# Patient Record
Sex: MALE | Race: BLACK OR AFRICAN AMERICAN | NOT HISPANIC OR LATINO | Employment: UNEMPLOYED | URBAN - METROPOLITAN AREA
[De-identification: names, ages, dates, MRNs, and addresses within clinical notes are randomized per-mention and may not be internally consistent; named-entity substitution may affect disease eponyms.]

---

## 2023-12-04 ENCOUNTER — TELEPHONE (OUTPATIENT)
Dept: HEMATOLOGY/ONCOLOGY | Facility: CLINIC | Age: 73
End: 2023-12-04
Payer: MEDICAID

## 2024-03-02 PROBLEM — D68.9 COAGULOPATHY: Status: ACTIVE | Noted: 2024-03-02

## 2024-03-02 PROBLEM — Z85.05: Status: ACTIVE | Noted: 2024-03-02

## 2025-04-07 DIAGNOSIS — I27.20 PULMONARY HYPERTENSION: ICD-10-CM

## 2025-04-07 DIAGNOSIS — R91.8 LUNG NODULES: Primary | ICD-10-CM

## 2025-04-27 ENCOUNTER — HOSPITAL ENCOUNTER (INPATIENT)
Facility: HOSPITAL | Age: 75
LOS: 2 days | Discharge: HOME | DRG: 378 | End: 2025-04-29
Attending: STUDENT IN AN ORGANIZED HEALTH CARE EDUCATION/TRAINING PROGRAM | Admitting: STUDENT IN AN ORGANIZED HEALTH CARE EDUCATION/TRAINING PROGRAM
Payer: COMMERCIAL

## 2025-04-27 DIAGNOSIS — I48.91 A-FIB: ICD-10-CM

## 2025-04-27 DIAGNOSIS — K92.2 UPPER GI BLEED: ICD-10-CM

## 2025-04-27 DIAGNOSIS — R79.89 ELEVATED BRAIN NATRIURETIC PEPTIDE (BNP) LEVEL: ICD-10-CM

## 2025-04-27 DIAGNOSIS — K92.2 GASTROINTESTINAL HEMORRHAGE, UNSPECIFIED GASTROINTESTINAL HEMORRHAGE TYPE: Primary | ICD-10-CM

## 2025-04-27 DIAGNOSIS — Z85.05: ICD-10-CM

## 2025-04-27 PROBLEM — I10 HTN (HYPERTENSION): Status: ACTIVE | Noted: 2025-04-27

## 2025-04-27 PROBLEM — I73.9 PVD (PERIPHERAL VASCULAR DISEASE): Chronic | Status: ACTIVE | Noted: 2025-04-27

## 2025-04-27 PROBLEM — E11.9 DM (DIABETES MELLITUS): Status: ACTIVE | Noted: 2025-04-27

## 2025-04-27 PROBLEM — I73.9 PAD (PERIPHERAL ARTERY DISEASE): Status: ACTIVE | Noted: 2025-04-27

## 2025-04-27 PROBLEM — R91.1 PULMONARY NODULE: Chronic | Status: ACTIVE | Noted: 2025-04-27

## 2025-04-27 LAB
ABSOLUTE EOSINOPHIL (OHS): 0.04 K/UL
ABSOLUTE MONOCYTE (OHS): 0.55 K/UL (ref 0.3–1)
ABSOLUTE NEUTROPHIL COUNT (OHS): 2.79 K/UL (ref 1.8–7.7)
BASOPHILS # BLD AUTO: 0.02 K/UL
BASOPHILS NFR BLD AUTO: 0.5 %
EAG (OHS): 134 MG/DL (ref 68–131)
ERYTHROCYTE [DISTWIDTH] IN BLOOD BY AUTOMATED COUNT: 16.9 % (ref 11.5–14.5)
HBA1C MFR BLD: 6.3 % (ref 4–5.6)
HCT VFR BLD AUTO: 25.9 % (ref 40–54)
HGB BLD-MCNC: 7.7 GM/DL (ref 14–18)
IMM GRANULOCYTES # BLD AUTO: 0.01 K/UL (ref 0–0.04)
IMM GRANULOCYTES NFR BLD AUTO: 0.2 % (ref 0–0.5)
INDIRECT COOMBS: NORMAL
LYMPHOCYTES # BLD AUTO: 0.83 K/UL (ref 1–4.8)
MCH RBC QN AUTO: 22.4 PG (ref 27–31)
MCHC RBC AUTO-ENTMCNC: 29.7 G/DL (ref 32–36)
MCV RBC AUTO: 76 FL (ref 82–98)
NUCLEATED RBC (/100WBC) (OHS): 0 /100 WBC
PLATELET # BLD AUTO: 142 K/UL (ref 150–450)
PMV BLD AUTO: 10.4 FL (ref 9.2–12.9)
POCT GLUCOSE: 93 MG/DL (ref 70–110)
RBC # BLD AUTO: 3.43 M/UL (ref 4.6–6.2)
RELATIVE EOSINOPHIL (OHS): 0.9 %
RELATIVE LYMPHOCYTE (OHS): 19.6 % (ref 18–48)
RELATIVE MONOCYTE (OHS): 13 % (ref 4–15)
RELATIVE NEUTROPHIL (OHS): 65.8 % (ref 38–73)
RH BLD: NORMAL
SPECIMEN OUTDATE: NORMAL
WBC # BLD AUTO: 4.24 K/UL (ref 3.9–12.7)

## 2025-04-27 PROCEDURE — 93005 ELECTROCARDIOGRAM TRACING: CPT

## 2025-04-27 PROCEDURE — 63600175 PHARM REV CODE 636 W HCPCS

## 2025-04-27 PROCEDURE — 11000001 HC ACUTE MED/SURG PRIVATE ROOM

## 2025-04-27 PROCEDURE — 85025 COMPLETE CBC W/AUTO DIFF WBC: CPT

## 2025-04-27 PROCEDURE — 93010 ELECTROCARDIOGRAM REPORT: CPT | Mod: ,,, | Performed by: STUDENT IN AN ORGANIZED HEALTH CARE EDUCATION/TRAINING PROGRAM

## 2025-04-27 PROCEDURE — 83036 HEMOGLOBIN GLYCOSYLATED A1C: CPT

## 2025-04-27 PROCEDURE — 86901 BLOOD TYPING SEROLOGIC RH(D): CPT | Mod: 91

## 2025-04-27 RX ORDER — TALC
6 POWDER (GRAM) TOPICAL NIGHTLY PRN
Status: DISCONTINUED | OUTPATIENT
Start: 2025-04-27 | End: 2025-04-29 | Stop reason: HOSPADM

## 2025-04-27 RX ORDER — IBUPROFEN 200 MG
24 TABLET ORAL
Status: DISCONTINUED | OUTPATIENT
Start: 2025-04-27 | End: 2025-04-29 | Stop reason: HOSPADM

## 2025-04-27 RX ORDER — PANTOPRAZOLE SODIUM 40 MG/10ML
40 INJECTION, POWDER, LYOPHILIZED, FOR SOLUTION INTRAVENOUS 2 TIMES DAILY
Status: DISCONTINUED | OUTPATIENT
Start: 2025-04-27 | End: 2025-04-29 | Stop reason: HOSPADM

## 2025-04-27 RX ORDER — INSULIN ASPART 100 [IU]/ML
0-5 INJECTION, SOLUTION INTRAVENOUS; SUBCUTANEOUS
Status: DISCONTINUED | OUTPATIENT
Start: 2025-04-27 | End: 2025-04-29 | Stop reason: HOSPADM

## 2025-04-27 RX ORDER — GLUCAGON 1 MG
1 KIT INJECTION
Status: DISCONTINUED | OUTPATIENT
Start: 2025-04-27 | End: 2025-04-29 | Stop reason: HOSPADM

## 2025-04-27 RX ORDER — HYDROCODONE BITARTRATE AND ACETAMINOPHEN 5; 325 MG/1; MG/1
1 TABLET ORAL EVERY 6 HOURS PRN
Refills: 0 | Status: DISCONTINUED | OUTPATIENT
Start: 2025-04-27 | End: 2025-04-29 | Stop reason: HOSPADM

## 2025-04-27 RX ORDER — IBUPROFEN 200 MG
16 TABLET ORAL
Status: DISCONTINUED | OUTPATIENT
Start: 2025-04-27 | End: 2025-04-29 | Stop reason: HOSPADM

## 2025-04-27 RX ORDER — ONDANSETRON HYDROCHLORIDE 2 MG/ML
4 INJECTION, SOLUTION INTRAVENOUS EVERY 12 HOURS PRN
Status: DISCONTINUED | OUTPATIENT
Start: 2025-04-27 | End: 2025-04-29 | Stop reason: HOSPADM

## 2025-04-27 RX ORDER — PROCHLORPERAZINE EDISYLATE 5 MG/ML
5 INJECTION INTRAMUSCULAR; INTRAVENOUS EVERY 6 HOURS PRN
Status: DISCONTINUED | OUTPATIENT
Start: 2025-04-27 | End: 2025-04-29 | Stop reason: HOSPADM

## 2025-04-27 RX ORDER — ACETAMINOPHEN 325 MG/1
650 TABLET ORAL EVERY 8 HOURS PRN
Status: DISCONTINUED | OUTPATIENT
Start: 2025-04-27 | End: 2025-04-29 | Stop reason: HOSPADM

## 2025-04-27 RX ADMIN — PANTOPRAZOLE SODIUM 40 MG: 40 INJECTION, POWDER, FOR SOLUTION INTRAVENOUS at 07:04

## 2025-04-28 ENCOUNTER — ANESTHESIA (OUTPATIENT)
Dept: ENDOSCOPY | Facility: HOSPITAL | Age: 75
End: 2025-04-28
Payer: COMMERCIAL

## 2025-04-28 ENCOUNTER — ANESTHESIA EVENT (OUTPATIENT)
Dept: ENDOSCOPY | Facility: HOSPITAL | Age: 75
End: 2025-04-28
Payer: COMMERCIAL

## 2025-04-28 LAB
ABSOLUTE EOSINOPHIL (OHS): 0.04 K/UL
ABSOLUTE MONOCYTE (OHS): 0.56 K/UL (ref 0.3–1)
ABSOLUTE NEUTROPHIL COUNT (OHS): 1.99 K/UL (ref 1.8–7.7)
ANION GAP (OHS): 7 MMOL/L (ref 8–16)
APICAL FOUR CHAMBER EJECTION FRACTION: 55 %
APICAL TWO CHAMBER EJECTION FRACTION: 37 %
ASCENDING AORTA: 2.8 CM
AV INDEX (PROSTH): 0.68
AV MEAN GRADIENT: 5 MMHG
AV PEAK GRADIENT: 10 MMHG
AV VALVE AREA BY VELOCITY RATIO: 1.6 CM²
AV VALVE AREA: 1.7 CM²
AV VELOCITY RATIO: 0.63
BASOPHILS # BLD AUTO: 0.02 K/UL
BASOPHILS NFR BLD AUTO: 0.6 %
BSA FOR ECHO PROCEDURE: 1.85 M2
BUN SERPL-MCNC: 24 MG/DL (ref 8–23)
CALCIUM SERPL-MCNC: 8.9 MG/DL (ref 8.7–10.5)
CHLORIDE SERPL-SCNC: 109 MMOL/L (ref 95–110)
CO2 SERPL-SCNC: 20 MMOL/L (ref 23–29)
CREAT SERPL-MCNC: 1.9 MG/DL (ref 0.5–1.4)
CV ECHO LV RWT: 0.36 CM
DOP CALC AO PEAK VEL: 1.6 M/S
DOP CALC AO VTI: 32.8 CM
DOP CALC LVOT AREA: 2.5 CM2
DOP CALC LVOT DIAMETER: 1.8 CM
DOP CALC LVOT PEAK VEL: 1 M/S
DOP CALC LVOT STROKE VOLUME: 56.7 CM3
DOP CALC MV VTI: 34.8 CM
DOP CALCLVOT PEAK VEL VTI: 22.3 CM
E WAVE DECELERATION TIME: 141 MSEC
E/E' RATIO: 7 M/S
ECHO LV POSTERIOR WALL: 0.8 CM (ref 0.6–1.1)
ERYTHROCYTE [DISTWIDTH] IN BLOOD BY AUTOMATED COUNT: 16.5 % (ref 11.5–14.5)
FRACTIONAL SHORTENING: 31.1 % (ref 28–44)
GFR SERPLBLD CREATININE-BSD FMLA CKD-EPI: 36 ML/MIN/1.73/M2
GLUCOSE SERPL-MCNC: 72 MG/DL (ref 70–110)
HCT VFR BLD AUTO: 24.9 % (ref 40–54)
HGB BLD-MCNC: 7.3 GM/DL (ref 14–18)
IMM GRANULOCYTES # BLD AUTO: 0.01 K/UL (ref 0–0.04)
IMM GRANULOCYTES NFR BLD AUTO: 0.3 % (ref 0–0.5)
INTERVENTRICULAR SEPTUM: 1 CM (ref 0.6–1.1)
IVC DIAMETER: 1.69 CM
LEFT ATRIUM AREA SYSTOLIC (APICAL 2 CHAMBER): 24.85 CM2
LEFT ATRIUM AREA SYSTOLIC (APICAL 4 CHAMBER): 25.18 CM2
LEFT ATRIUM VOLUME INDEX MOD: 50 ML/M2
LEFT ATRIUM VOLUME MOD: 92 ML
LEFT INTERNAL DIMENSION IN SYSTOLE: 3.1 CM (ref 2.1–4)
LEFT VENTRICLE DIASTOLIC VOLUME INDEX: 49.73 ML/M2
LEFT VENTRICLE DIASTOLIC VOLUME: 92 ML
LEFT VENTRICLE END DIASTOLIC VOLUME APICAL 2 CHAMBER: 55.53 ML
LEFT VENTRICLE END DIASTOLIC VOLUME APICAL 4 CHAMBER: 65.8 ML
LEFT VENTRICLE END SYSTOLIC VOLUME APICAL 2 CHAMBER: 92.46 ML
LEFT VENTRICLE END SYSTOLIC VOLUME APICAL 4 CHAMBER: 74.53 ML
LEFT VENTRICLE MASS INDEX: 71.8 G/M2
LEFT VENTRICLE SYSTOLIC VOLUME INDEX: 20 ML/M2
LEFT VENTRICLE SYSTOLIC VOLUME: 37 ML
LEFT VENTRICULAR INTERNAL DIMENSION IN DIASTOLE: 4.5 CM (ref 3.5–6)
LEFT VENTRICULAR MASS: 132.8 G
LV LATERAL E/E' RATIO: 5.7 M/S
LV SEPTAL E/E' RATIO: 8.5 M/S
LVED V (TEICH): 91.88 ML
LVES V (TEICH): 36.98 ML
LVOT MG: 2.14 MMHG
LVOT MV: 0.68 CM/S
LYMPHOCYTES # BLD AUTO: 0.53 K/UL (ref 1–4.8)
MAGNESIUM SERPL-MCNC: 2 MG/DL (ref 1.6–2.6)
MCH RBC QN AUTO: 22.1 PG (ref 27–31)
MCHC RBC AUTO-ENTMCNC: 29.3 G/DL (ref 32–36)
MCV RBC AUTO: 75 FL (ref 82–98)
MV MEAN GRADIENT: 2 MMHG
MV PEAK E VEL: 0.85 M/S
MV PEAK GRADIENT: 6 MMHG
MV VALVE AREA BY CONTINUITY EQUATION: 1.63 CM2
NUCLEATED RBC (/100WBC) (OHS): 0 /100 WBC
OHS CV RV/LV RATIO: 0.82 CM
OHS QRS DURATION: 84 MS
OHS QTC CALCULATION: 415 MS
PISA TR MAX VEL: 2.7 M/S
PLATELET # BLD AUTO: 155 K/UL (ref 150–450)
PMV BLD AUTO: 11.4 FL (ref 9.2–12.9)
POCT GLUCOSE: 117 MG/DL (ref 70–110)
POCT GLUCOSE: 63 MG/DL (ref 70–110)
POCT GLUCOSE: 78 MG/DL (ref 70–110)
POCT GLUCOSE: 99 MG/DL (ref 70–110)
POTASSIUM SERPL-SCNC: 4 MMOL/L (ref 3.5–5.1)
PV MV: 0.68 M/S
PV PEAK GRADIENT: 4 MMHG
PV PEAK VELOCITY: 1.02 M/S
RA PRESSURE ESTIMATED: 3 MMHG
RA VOL SYS: 73.77 ML
RBC # BLD AUTO: 3.31 M/UL (ref 4.6–6.2)
RELATIVE EOSINOPHIL (OHS): 1.3 %
RELATIVE LYMPHOCYTE (OHS): 16.8 % (ref 18–48)
RELATIVE MONOCYTE (OHS): 17.8 % (ref 4–15)
RELATIVE NEUTROPHIL (OHS): 63.2 % (ref 38–73)
RIGHT ATRIAL AREA: 24.2 CM2
RIGHT ATRIUM VOLUME AREA LENGTH APICAL 4 CHAMBER: 70.46 ML
RIGHT VENTRICLE DIASTOLIC BASEL DIMENSION: 3.7 CM
RV TB RVSP: 6 MMHG
RV TISSUE DOPPLER FREE WALL SYSTOLIC VELOCITY 1 (APICAL 4 CHAMBER VIEW): 9.8 CM/S
SINUS: 2.96 CM
SODIUM SERPL-SCNC: 136 MMOL/L (ref 136–145)
STJ: 2.6 CM
TDI LATERAL: 0.15 M/S
TDI SEPTAL: 0.1 M/S
TDI: 0.13 M/S
TR MAX PG: 29 MMHG
TRICUSPID ANNULAR PLANE SYSTOLIC EXCURSION: 1.4 CM
TV REST PULMONARY ARTERY PRESSURE: 32 MMHG
WBC # BLD AUTO: 3.15 K/UL (ref 3.9–12.7)
Z-SCORE OF LEFT VENTRICULAR DIMENSION IN END DIASTOLE: -1.33
Z-SCORE OF LEFT VENTRICULAR DIMENSION IN END SYSTOLE: -0.18

## 2025-04-28 PROCEDURE — 80048 BASIC METABOLIC PNL TOTAL CA: CPT

## 2025-04-28 PROCEDURE — 99900035 HC TECH TIME PER 15 MIN (STAT)

## 2025-04-28 PROCEDURE — 11000001 HC ACUTE MED/SURG PRIVATE ROOM

## 2025-04-28 PROCEDURE — 37000009 HC ANESTHESIA EA ADD 15 MINS: Performed by: INTERNAL MEDICINE

## 2025-04-28 PROCEDURE — 37000008 HC ANESTHESIA 1ST 15 MINUTES: Performed by: INTERNAL MEDICINE

## 2025-04-28 PROCEDURE — 43235 EGD DIAGNOSTIC BRUSH WASH: CPT | Mod: ,,, | Performed by: INTERNAL MEDICINE

## 2025-04-28 PROCEDURE — 63600175 PHARM REV CODE 636 W HCPCS: Performed by: HOSPITALIST

## 2025-04-28 PROCEDURE — 63600175 PHARM REV CODE 636 W HCPCS

## 2025-04-28 PROCEDURE — 25000003 PHARM REV CODE 250

## 2025-04-28 PROCEDURE — 83735 ASSAY OF MAGNESIUM: CPT

## 2025-04-28 PROCEDURE — 0DJ08ZZ INSPECTION OF UPPER INTESTINAL TRACT, VIA NATURAL OR ARTIFICIAL OPENING ENDOSCOPIC: ICD-10-PCS | Performed by: INTERNAL MEDICINE

## 2025-04-28 PROCEDURE — 85025 COMPLETE CBC W/AUTO DIFF WBC: CPT

## 2025-04-28 PROCEDURE — 43235 EGD DIAGNOSTIC BRUSH WASH: CPT | Performed by: INTERNAL MEDICINE

## 2025-04-28 PROCEDURE — 99223 1ST HOSP IP/OBS HIGH 75: CPT | Mod: 25,GC,, | Performed by: INTERNAL MEDICINE

## 2025-04-28 PROCEDURE — 25000003 PHARM REV CODE 250: Performed by: INTERNAL MEDICINE

## 2025-04-28 PROCEDURE — 63600175 PHARM REV CODE 636 W HCPCS: Performed by: NURSE ANESTHETIST, CERTIFIED REGISTERED

## 2025-04-28 PROCEDURE — 25000003 PHARM REV CODE 250: Performed by: NURSE ANESTHETIST, CERTIFIED REGISTERED

## 2025-04-28 RX ORDER — TOPICAL ANESTHETIC 200 MG/ML
SPRAY DENTAL; PERIODONTAL
Status: DISCONTINUED | OUTPATIENT
Start: 2025-04-28 | End: 2025-04-28

## 2025-04-28 RX ORDER — LIDOCAINE HYDROCHLORIDE 20 MG/ML
INJECTION, SOLUTION EPIDURAL; INFILTRATION; INTRACAUDAL; PERINEURAL
Status: DISCONTINUED | OUTPATIENT
Start: 2025-04-28 | End: 2025-04-28

## 2025-04-28 RX ORDER — CEFTRIAXONE 1 G/1
1 INJECTION, POWDER, FOR SOLUTION INTRAMUSCULAR; INTRAVENOUS
Status: DISCONTINUED | OUTPATIENT
Start: 2025-04-28 | End: 2025-04-29 | Stop reason: HOSPADM

## 2025-04-28 RX ORDER — DEXTROMETHORPHAN/PSEUDOEPHED 2.5-7.5/.8
DROPS ORAL
Status: COMPLETED | OUTPATIENT
Start: 2025-04-28 | End: 2025-04-28

## 2025-04-28 RX ORDER — PROPOFOL 10 MG/ML
VIAL (ML) INTRAVENOUS
Status: DISCONTINUED | OUTPATIENT
Start: 2025-04-28 | End: 2025-04-28

## 2025-04-28 RX ORDER — PROPOFOL 10 MG/ML
VIAL (ML) INTRAVENOUS CONTINUOUS PRN
Status: DISCONTINUED | OUTPATIENT
Start: 2025-04-28 | End: 2025-04-28

## 2025-04-28 RX ORDER — DEXMEDETOMIDINE HYDROCHLORIDE 100 UG/ML
INJECTION, SOLUTION INTRAVENOUS
Status: DISCONTINUED | OUTPATIENT
Start: 2025-04-28 | End: 2025-04-28

## 2025-04-28 RX ADMIN — LIDOCAINE HYDROCHLORIDE 50 MG: 20 INJECTION, SOLUTION EPIDURAL; INFILTRATION; INTRACAUDAL; PERINEURAL at 03:04

## 2025-04-28 RX ADMIN — CEFTRIAXONE SODIUM 1 G: 1 INJECTION, POWDER, FOR SOLUTION INTRAMUSCULAR; INTRAVENOUS at 08:04

## 2025-04-28 RX ADMIN — TOPICAL ANESTHETIC 1 EACH: 200 SPRAY DENTAL; PERIODONTAL at 03:04

## 2025-04-28 RX ADMIN — PANTOPRAZOLE SODIUM 40 MG: 40 INJECTION, POWDER, FOR SOLUTION INTRAVENOUS at 08:04

## 2025-04-28 RX ADMIN — PROPOFOL 150 MCG/KG/MIN: 10 INJECTION, EMULSION INTRAVENOUS at 03:04

## 2025-04-28 RX ADMIN — PANTOPRAZOLE SODIUM 40 MG: 40 INJECTION, POWDER, FOR SOLUTION INTRAVENOUS at 09:04

## 2025-04-28 RX ADMIN — DEXMEDETOMIDINE HYDROCHLORIDE 8 MCG: 100 INJECTION, SOLUTION, CONCENTRATE INTRAVENOUS at 03:04

## 2025-04-28 RX ADMIN — PROPOFOL 100 MG: 10 INJECTION, EMULSION INTRAVENOUS at 03:04

## 2025-04-28 RX ADMIN — DEXTROSE MONOHYDRATE 12.5 G: 25 INJECTION, SOLUTION INTRAVENOUS at 11:04

## 2025-04-28 RX ADMIN — GLYCOPYRROLATE 0.2 MG: 0.2 INJECTION, SOLUTION INTRAMUSCULAR; INTRAVITREAL at 03:04

## 2025-04-28 NOTE — TRANSFER OF CARE
"Anesthesia Transfer of Care Note    Patient: Oneil Meza    Procedure(s) Performed: Procedure(s) (LRB):  EGD (ESOPHAGOGASTRODUODENOSCOPY) (N/A)    Patient location: GI    Anesthesia Type: general    Transport from OR: Transported from OR on room air with adequate spontaneous ventilation    Post pain: adequate analgesia    Post assessment: no apparent anesthetic complications and tolerated procedure well    Post vital signs: stable    Level of consciousness: awake and alert    Nausea/Vomiting: no nausea/vomiting    Complications: none    Transfer of care protocol was followed      Last vitals: Visit Vitals  /69 (BP Location: Left arm, Patient Position: Lying)   Pulse 74   Temp 36.5 °C (97.7 °F) (Temporal)   Resp 18   Ht 5' 8" (1.727 m)   Wt 71.3 kg (157 lb 3 oz)   SpO2 98%   BMI 23.90 kg/m²     "

## 2025-04-28 NOTE — ASSESSMENT & PLAN NOTE
Patient's hemorrhage is due to unsure source, this bleeding.  Patient is on anticoagulant. Patients most recent Hgb, Hct, platelets, and INR are listed below.  Recent Labs     04/27/25  1257 04/27/25 2016   HGB 6.3* 7.7*   HCT 21.3* 25.9*    142*   INR 1.2  --      Plan  - Will trend hemoglobin/hematocrit Daily  - Will monitor and correct any coagulation defects  - Will transfuse if Hgb is <7g/dl (<8g/dl in cases of active ACS) or if patient has rapid bleeding leading to hemodynamic instability  - we will monitor and trend  -and GI pathway, pantoprazole 40 mg b.i.d.  -currently clear liquid diet  -NPO after midnight  -GI consulted

## 2025-04-28 NOTE — ASSESSMENT & PLAN NOTE
Patient has long standing persistent (>12 months) atrial fibrillation.   -on home Coumadin, we will touch base with PCP to verify medication but hold for now Pulse  Min: 56  Max: 76     Antiarrhythmics   Coumadin on hold for now    Anticoagulants       Plan  - Replete lytes with a goal of K>4, Mg >2  - Patient is anticoagulated, see medications listed above.  - Patient's afib is currently controlled  - currently holding Coumadin  -will verify with PCP tomorrow Coumadin dose  -telemetry monitor

## 2025-04-28 NOTE — HPI
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  He gives an account of feeling light-headed, dizzy, faint feeling, nausea this am, contant leg cramps, and dark stools for the last few days. He has been getting SOB upon exertion. He denies CP, fever, chills. He gives an account of having dark stools for the last few days and in the previous ER +guaiac-positive brown stool. He does take coumadin but not sure about the dosage and give an account of taking it BID but forgets sometimes but states he took one today. Patient unsure about name of daily medications he takes. He is AAO4, able to questions appropriately.  Patient was transferred to [place] for GI consult under hospital medicine service.    ROS: + short of breath on exertion, bilateral lower extremity cramps, dizziness, faint feeling, dark stools  PE:  Bilateral lower extremity with +1 pedal pulse the right with, +1 dorsalis pulse to left foot, no signs of distress    Previous ER lab: Hgb 6.3 (base 10-12), hematocrit 21.3, platelet 150.  Cr 2.2 (base 1.4-1.8)  PT/INR: 13.3/1.2.    Troponin 0.014, , sodium 137, potassium 4.2, chloride 107, CO2 23, BUN 31, creatinine 2.2, glucose 187, albumin 3.6, AST 11, ALT 7, INR 1.2, stool for occult blood positive, low white blood cells 2.95     Previous ER meds:  IV fluid along, Protonix bolus and infusion.  Packed red blood cells are ordered, awaiting transfusion.  He has 2 peripheral IVs in place.       Imaging a previous hospital:  Chest x-ray had no acute chest disease identified.  Left lung nodule noted on the recent CT chest without contrast from April 21 was not clearly  visible on the portable chest x-ray.     EKG showed atrial fibrillation with ventricular rate 74.

## 2025-04-28 NOTE — ASSESSMENT & PLAN NOTE
Patient's blood pressure range in the last 24 hours was: BP  Min: 110/68  Max: 173/70.The patient's inpatient anti-hypertensive regimen is listed below:  Current Antihypertensives       Plan  - BP is controlled, no changes needed to their regimen  - we will adjust as needed  -currently holding blood pressure medication

## 2025-04-28 NOTE — SUBJECTIVE & OBJECTIVE
Interval History:  Doing well today, denies any further black or bloody bowel movements.  States he occasionally gets cramps in his legs but otherwise no acute complaints.  He does not recall why he was switched from apixaban to Coumadin; this is also not clear through my extensive chart review.    Review of Systems  Objective:     Vital Signs (Most Recent):  Temp: 98.6 °F (37 °C) (04/28/25 1112)  Pulse: 70 (04/28/25 1112)  Resp: 18 (04/28/25 1112)  BP: (!) 143/71 (04/28/25 1112)  SpO2: 99 % (04/28/25 1112) Vital Signs (24h Range):  Temp:  [98.2 °F (36.8 °C)-98.7 °F (37.1 °C)] 98.6 °F (37 °C)  Pulse:  [54-88] 70  Resp:  [18-31] 18  SpO2:  [96 %-100 %] 99 %  BP: (110-173)/(53-89) 143/71     Weight: 71.3 kg (157 lb 3 oz)  Body mass index is 23.9 kg/m².    Intake/Output Summary (Last 24 hours) at 4/28/2025 1200  Last data filed at 4/28/2025 0449  Gross per 24 hour   Intake --   Output 500 ml   Net -500 ml         Physical Exam  Vitals reviewed.   Constitutional:       General: He is not in acute distress.     Appearance: He is well-developed. He is not diaphoretic.   HENT:      Head: Normocephalic and atraumatic.      Nose: Nose normal.   Eyes:      General: No scleral icterus.     Pupils: Pupils are equal, round, and reactive to light.   Neck:      Trachea: No tracheal deviation.   Cardiovascular:      Rate and Rhythm: Normal rate. Rhythm irregular.      Heart sounds: Normal heart sounds. No murmur heard.     No friction rub. No gallop.   Pulmonary:      Effort: Pulmonary effort is normal. No respiratory distress.      Breath sounds: Normal breath sounds.   Abdominal:      General: There is no distension.      Palpations: Abdomen is soft. There is no mass.      Tenderness: There is no abdominal tenderness.      Hernia: No hernia is present.   Musculoskeletal:         General: No deformity.      Cervical back: Normal range of motion.   Skin:     General: Skin is warm and dry.      Findings: No rash.   Neurological:       Mental Status: He is alert and oriented to person, place, and time.   Psychiatric:         Behavior: Behavior normal.               Significant Labs: All pertinent labs within the past 24 hours have been reviewed.    Significant Imaging: I have reviewed all pertinent imaging results/findings within the past 24 hours.

## 2025-04-28 NOTE — ASSESSMENT & PLAN NOTE
"Patient's FSGs are uncontrolled due to hyperglycemia on current medication regimen.  Last A1c reviewed-   Lab Results   Component Value Date    HGBA1C 6.8 (H) 07/11/2016     Most recent fingerstick glucose reviewed- No results for input(s): "POCTGLUCOSE" in the last 24 hours.  Current correctional scale  Low  Maintain anti-hyperglycemic dose as follows-   Antihyperglycemics (From admission, onward)      Start     Stop Route Frequency Ordered    04/27/25 2144  insulin aspart U-100 pen 0-5 Units         -- SubQ Before meals & nightly PRN 04/27/25 2044          Hold Oral hypoglycemics while patient is in the hospital.    -Accu-Cheks AC and HS  -NPO after midnight, thank you 6 Accu-Checks    "

## 2025-04-28 NOTE — ASSESSMENT & PLAN NOTE
Patient's blood pressure range in the last 24 hours was: BP  Min: 127/83  Max: 173/70.The patient's inpatient anti-hypertensive regimen is listed below:  Current Antihypertensives       Plan  - BP is controlled, no changes needed to their regimen  - we will adjust as needed  -currently holding blood pressure medication

## 2025-04-28 NOTE — ASSESSMENT & PLAN NOTE
-holding Coumadin for now and may want to consider changing medication is there is no clear indication for Coumadin rather than apixaban

## 2025-04-28 NOTE — ASSESSMENT & PLAN NOTE
-no known cardiac history other than hypertension, on physical exam no elevated JVD, no bilateral lower extremity edema,lungs clear by shortness of breath on exertion reported  -elevated   -pending echo  -cardiac monitor  -I's and O's

## 2025-04-28 NOTE — ASSESSMENT & PLAN NOTE
Patient's FSGs are uncontrolled due to hyperglycemia on current medication regimen.  Last A1c reviewed-   Lab Results   Component Value Date    HGBA1C 6.3 (H) 04/27/2025     Most recent fingerstick glucose reviewed-   Recent Labs   Lab 04/27/25 2058 04/28/25  0629 04/28/25  1111   POCTGLUCOSE 93 99 63*     Current correctional scale  Low  Maintain anti-hyperglycemic dose as follows-   Antihyperglycemics (From admission, onward)      Start     Stop Route Frequency Ordered    04/27/25 2144  insulin aspart U-100 pen 0-5 Units         -- SubQ Before meals & nightly PRN 04/27/25 2044          Hold Oral hypoglycemics while patient is in the hospital.    -Accu-Cheks AC and HS  -NPO after midnight, thank you 6 Accu-Checks

## 2025-04-28 NOTE — H&P
[facility] Medicine  History & Physical    Patient Name: Oneil Meza  MRN: [id number]  Patient Class: IP- Inpatient  Admission Date: 4/27/2025  Attending Physician: Jeff Castro MD   Primary Care Provider: Korey Bower MD         Patient information was obtained from patient, past medical records, and ER records.     Subjective:     Principal Problem:GI bleed    Chief Complaint: No chief complaint on file.       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  He gives an account of feeling light-headed, dizzy, faint feeling, nausea this am, contant leg cramps, and dark stools for the last few days. He has been getting SOB upon exertion. He denies CP, fever, chills. He gives an account of having dark stools for the last few days and in the previous ER +guaiac-positive brown stool. He does take coumadin but not sure about the dosage and give an account of taking it BID but forgets sometimes but states he took one today. Patient unsure about name of daily medications he takes. He is AAO4, able to questions appropriately.  Patient was transferred to [place] for GI consult under hospital medicine service.    ROS: + short of breath on exertion, bilateral lower extremity cramps, dizziness, faint feeling, dark stools  PE:  Bilateral lower extremity with +1 pedal pulse the right with, +1 dorsalis pulse to left foot, no signs of distress    Previous ER lab: Hgb 6.3 (base 10-12), hematocrit 21.3, platelet 150.  Cr 2.2 (base 1.4-1.8)  PT/INR: 13.3/1.2.    Troponin 0.014, , sodium 137, potassium 4.2, chloride 107, CO2 23, BUN 31, creatinine 2.2,  glucose 187, albumin 3.6, AST 11, ALT 7, INR 1.2, stool for occult blood positive, low white blood cells 2.95     Previous ER meds:  IV fluid along, Protonix bolus and infusion.  Packed red blood cells are ordered, awaiting transfusion.  He has 2 peripheral IVs in place.       Imaging a previous hospital:  Chest x-ray had no acute chest disease identified.  Left lung nodule noted on the recent CT chest without contrast from April 21 was not clearly visible on the portable chest x-ray.     EKG showed atrial fibrillation with ventricular rate 74.    No new subjective & objective note has been filed under this hospital service since the last note was generated.    Assessment/Plan:     Assessment & Plan  History of primary malignant neoplasm of liver  -patient with complaints of abdominal pain to the epigastric region and tenderness to touch  -CTA abdomen pelvis with no contrast: Pending    Coagulopathy  -holding Coumadin for now  -slightly supratherapeutic PT 13.1  -day shift to notify PCP or clear up Coumadin home ordered prior to resuming and while in the setting of GI bleed    PAD (peripheral artery disease)  History of stent to right lower extremity  -encourage exercise  -dietary modification:  Healthy heart diet and return to oral intake  -manage blood pressure  -corona anticoagulant but on hold for right now due to GI bleed  -bilateral arterial ultrasound: Pending    HTN (hypertension)  Patient's blood pressure range in the last 24 hours was: BP  Min: 127/83  Max: 173/70.The patient's inpatient anti-hypertensive regimen is listed below:  Current Antihypertensives       Plan  - BP is controlled, no changes needed to their regimen  - we will adjust as needed  -currently holding blood pressure medication  DM (diabetes mellitus)  Patient's FSGs are uncontrolled due to hyperglycemia on current medication regimen.  Last A1c reviewed-   Lab Results   Component Value Date    HGBA1C 6.8 (H) 07/11/2016     Most recent  "fingerstick glucose reviewed- No results for input(s): "POCTGLUCOSE" in the last 24 hours.  Current correctional scale  Low  Maintain anti-hyperglycemic dose as follows-   Antihyperglycemics (From admission, onward)      Start     Stop Route Frequency Ordered    04/27/25 2144  insulin aspart U-100 pen 0-5 Units         -- SubQ Before meals & nightly PRN 04/27/25 2044          Hold Oral hypoglycemics while patient is in the hospital.    -Accu-Cheks AC and HS  -NPO after midnight, thank you 6 Accu-Checks    Pulmonary nodule  -noted in history, appears stable, outpatient CT chest  -denies smoking    A-fib  Patient has long standing persistent (>12 months) atrial fibrillation.   -on home Coumadin, we will touch base with PCP to verify medication but hold for now Pulse  Min: 56  Max: 76     Antiarrhythmics   Coumadin on hold for now    Anticoagulants       Plan  - Replete lytes with a goal of K>4, Mg >2  - Patient is anticoagulated, see medications listed above.  - Patient's afib is currently controlled  - currently holding Coumadin  -will verify with PCP tomorrow Coumadin dose  -telemetry monitor      GI bleed  Patient's hemorrhage is due to  unsure source , this bleeding.  Patient is on anticoagulant. Patients most recent Hgb, Hct, platelets, and INR are listed below.  Recent Labs     04/27/25  1257 04/27/25 2016   HGB 6.3* 7.7*   HCT 21.3* 25.9*    142*   INR 1.2  --      Plan  - Will trend hemoglobin/hematocrit Daily  - Will monitor and correct any coagulation defects  - Will transfuse if Hgb is <7g/dl (<8g/dl in cases of active ACS) or if patient has rapid bleeding leading to hemodynamic instability  - we will monitor and trend  -and GI pathway, pantoprazole 40 mg b.i.d.  -currently clear liquid diet  -NPO after midnight  -GI consulted  Elevated brain natriuretic peptide (BNP) level  -no known cardiac history other than hypertension, on physical exam no elevated JVD, no bilateral lower extremity edema,lungs " clear by shortness of breath on exertion reported  -elevated   -pending echo  -cardiac monitor  -I's and O's    VTE Risk Mitigation (From admission, onward)           Ordered     IP VTE HIGH RISK PATIENT  Once         04/27/25 1932     Place sequential compression device  Until discontinued         04/27/25 1932                           Seen patient with Dr. Jeff Castro with hospital medicine service.         Marisabel Richardson, MERLY  Department of Hospital Medicine  [place] - Telemetry

## 2025-04-28 NOTE — ASSESSMENT & PLAN NOTE
-patient with complaints of abdominal pain to the epigastric region and tenderness to touch  -CTA abdomen pelvis with no contrast: Pending

## 2025-04-28 NOTE — NURSING
Received patient from [facility] via stretcher on stable conditions, AAOx4, on room air, denies any pain or discomfort at this time. Orientation to room provided, voices understanding. Vitals taken, telemetry monitor in place. Call light within his reach, bed in low position, bed alarm on, encourage to call as needed, voices understanding.   healthy happy mom and baby

## 2025-04-28 NOTE — ANESTHESIA PREPROCEDURE EVALUATION
04/28/2025  Oneil Meza is a 74 y.o., male.    Cirrosis post chemo for HCC    Pre-op Assessment     I have reviewed the Nursing Notes.    I have reviewed the Medications.     Review of Systems  Anesthesia Hx:  No problems with previous Anesthesia             Denies Family Hx of Anesthesia complications.     Social:  Non-Smoker, No Alcohol Use       Hematology/Oncology:  Hematology Normal   Oncology Normal                                   EENT/Dental:  EENT/Dental Normal           Cardiovascular:  Exercise tolerance: good   Hypertension   CAD                    Coronary Artery Disease:                            Hypertension     Atrial Fibrillation     Pulmonary:  Pulmonary Normal                       Renal/:  Renal/ Normal                 Hepatic/GI:  Hepatic/GI Normal                    Musculoskeletal:  Musculoskeletal Normal                Neurological:  Neurology Normal                                      Endocrine:  Diabetes    Diabetes                          Physical Exam  General: Well nourished    Airway:  Mallampati: II / II  Mouth Opening: Normal  TM Distance: Normal  Tongue: Normal  Neck ROM: Normal ROM    Dental:  Intact        Anesthesia Plan  Type of Anesthesia, risks & benefits discussed:    Anesthesia Type: Gen Natural Airway  Intra-op Monitoring Plan: Standard ASA Monitors  Post Op Pain Control Plan: multimodal analgesia  Induction:  IV  Airway Plan: Direct, Post-Induction  Informed Consent: Informed consent signed with the Patient and all parties understand the risks and agree with anesthesia plan.  All questions answered.   ASA Score: 3  Anesthesia Plan Notes: 7.3/24.9 H/H    Ready For Surgery From Anesthesia Perspective.     .

## 2025-04-28 NOTE — ASSESSMENT & PLAN NOTE
Patient has long standing persistent (>12 months) atrial fibrillation.   -on home Coumadin, we will touch base with PCP to verify medication but hold for now Pulse  Min: 54  Max: 88     Antiarrhythmics   Holding blood pressure medications for now in the setting of acute GI bleed    Anticoagulants       Plan  - Replete lytes with a goal of K>4, Mg >2  - Patient is not anticoagulated due to GI bleed  - Patient's afib is currently controlled  - currently holding Coumadin and would consider switch to DOAC given lower bleeding risk with apixaban in patients with cirrhosis  -telemetry monitor

## 2025-04-28 NOTE — CONSULTS
"[facility]    CC: "GI bleed"     HPI 74 y.o. male with a history of HCC/cholangiocarcinoma s/p resection in 2023, cirrhosis, afib on Coumadin, and PD s/p femoral artery stenting presenting due to nausea and shortness of breath associated with "black diarrhea" for the last 3 days. Denies abdominal pain. Denies NSAID use. He was transferred from [facility] for GI services.    HCC history complex, as he is s/p resection in 2023. Chemotherapy recommended at that time, however, not completed due to patient preferences after discussion with oncology. He agreed to undergo period surveillance however, this did not occur.    Chart reviewed and summarized here, collateral obtained from nursing staff.    Past Medical History  Past Medical History:   Diagnosis Date    Anticoagulant long-term use     Coronary artery disease     Hypertension         Social History  Social History[1]    Family History  No family history of gastric or colonic malignancy. No family history of liver disease     Physical Examination  /73 (BP Location: Right arm, Patient Position: Lying)   Pulse (!) 54   Temp 98.7 °F (37.1 °C) (Oral)   Resp 18   Ht 5' 8" (1.727 m)   Wt 71.3 kg (157 lb 3 oz)   SpO2 99%   BMI 23.90 kg/m²   General appearance: alert, cooperative, no distress  Eyes: conjunctivae/corneas clear  Lungs: no increased work of breathing  Heart: 2+ radial pulses regular and symmetric  Abdomen: soft, non-tender. Large abdominal scar  Extremities: extremities symmetric; no clubbing, cyanosis, or edema      Labs:  Recent Labs   Lab 04/27/25  1257 04/27/25  2016 04/28/25  0631   WBC 2.95* 4.24 3.15*   HGB 6.3* 7.7* 7.3*   HCT 21.3* 25.9* 24.9*    142* 155     --  136   K 4.2  --  4.0     --  109   CREATININE 2.2*  --  1.9*   BUN 31*  --  24*   CO2 23  --  20*   ALT 7*  --   --    AST 11  --   --         Imaging:  CXR without acute pathology  I have personally reviewed and interpreted these " images.    Assessment:   74M with a history of HCC/cholangiocarcinoma, cirrhosis, and anticoagulant use presenting with symptomatic anemia with Hb of 6.3 on presentation with baseline of 10-13. On the differential is hemobilia s/p recurrent cholangiocarcinoma, bleeding met, peptic ulcer, bowel ischemia, PHG, GAVE.     Plan:  BID PPI  EGD today  Remain NPO  Monitor H/H  Transfuse >7 careful not to over transfuse in case of portal hypertensive bleeding    Discussed with Dr. Sg Batista DO  [facility], PGY IV          [1]   Social History  Tobacco Use    Smoking status: Every Day     Current packs/day: 1.00     Average packs/day: 1 pack/day for 25.0 years (25.0 ttl pk-yrs)     Types: Cigarettes   Substance Use Topics    Alcohol use: No    Drug use: Yes     Comment: heroin-snort

## 2025-04-28 NOTE — ASSESSMENT & PLAN NOTE
-holding Coumadin for now  -slightly supratherapeutic PT 13.1  -day shift to notify PCP or clear up Coumadin home ordered prior to resuming and while in the setting of GI bleed

## 2025-04-28 NOTE — CARE UPDATE
04/28/25 0000   Respiratory Evaluation   $ Care Plan Tech Time 15 min  (ekg completed)     EKG was delayed because it did not come across respiratory printer nor EPIC screen at the time it stated it was ordered. Pt was transferred from ED to telemetry.

## 2025-04-28 NOTE — PLAN OF CARE
went to meet with patient. Patient reports he is independent and lives at home with his brother. He does not use any DME besides a glucometer. Patient drives at times, but his brother will transport home at discharge. Patient follows with [facility] in [place]. They do provide transportation to appointments. He is on coumadin as well, which [facility] follows his labs.  to reach out to [facility] ([facility] to schedule hospital follow-up. No anticipated discharge needs at this time. Patient encouraged to call with any questions or concerns.  will continue to follow patient through transitions of care and assist with any discharge needs.     [email or web address]  [facility]    [address] [place] [address] · [address]  [phone]  Open · Closes 5 PM    Other Contacts    Name Relation Home Work Mobile   Carlos Hanna [phone]        04/28/25 1036   Discharge Assessment   Assessment Type Discharge Planning Assessment   Confirmed/corrected address, phone number and insurance Yes   Confirmed Demographics Correct on Facesheet   Source of Information patient;health record   People in Home sibling(s)   Do you expect to return to your current living situation? Yes   Do you have help at home or someone to help you manage your care at home? Yes   Who are your caregiver(s) and their phone number(s)? Carlos Hanna [phone]   Prior to hospitilization cognitive status: Alert/Oriented   Current cognitive status: Alert/Oriented   Walking or Climbing Stairs Difficulty no   Dressing/Bathing Difficulty no   Equipment Currently Used at Home glucometer   Do you take prescription medications? Yes   Do you have prescription coverage? Yes   Do you have any problems affording any of your prescribed medications? No   Is the patient taking medications as prescribed? yes   Who is going to help you get home at discharge? Carlos Hanna [phone]   [phone]   How do you get to doctors appointments? car, drives self;family or friend will provide;agency   Are you on dialysis? No   Do you take coumadin? Yes   Who monitors your labs? [facility]   Discharge Plan A Home with family   Discharge Plan B Home with family;Home Health   DME Needed Upon Discharge  none   Discharge Plan discussed with: Patient   Transition of Care Barriers None   Physical Activity   On average, how many days per week do you engage in moderate to strenuous exercise (like a brisk walk)? Pt Declined   On average, how many minutes do you engage in exercise at this level? Pt Declined   Financial Resource Strain   How hard is it for you to pay for the very basics like food, housing, medical care, and heating? Not hard   Housing Stability   In the last 12 months, was there a time when you were not able to pay the mortgage or rent on time? N   At any time in the past 12 months, were you homeless or living in a shelter (including now)? N   Transportation Needs   In the past 12 months, has lack of transportation kept you from medical appointments or from getting medications? no   In the past 12 months, has lack of transportation kept you from meetings, work, or from getting things needed for daily living? No   Food Insecurity   Within the past 12 months, you worried that your food would run out before you got the money to buy more. Never true   Within the past 12 months, the food you bought just didn't last and you didn't have money to get more. Never true     Jaycee Auguste RN    [phone]

## 2025-04-28 NOTE — PLAN OF CARE
Report called to Jasmin   Recovery complete. Patient recovered to baseline. Discharge instructions reviewed with patient. VSS.

## 2025-04-28 NOTE — PROGRESS NOTES
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  He gives an account of feeling light-headed, dizzy, faint feeling, nausea this am, contant leg cramps, and dark stools for the last few days. He has been getting SOB upon exertion. He denies CP, fever, chills. He gives an account of having dark stools for the last few days and in the previous ER +guaiac-positive brown stool. He does take coumadin but not sure about the dosage and give an account of taking it BID but forgets sometimes but states he took one today. Patient unsure about name of daily medications he takes. He is AAO4, able to questions appropriately.  Patient was transferred to [facility] for GI consult under hospital medicine service.    ROS: + short of breath on exertion, bilateral lower extremity cramps, dizziness, faint feeling, dark stools  PE:  Bilateral lower extremity with +1 pedal pulse the right with, +1 dorsalis pulse to left foot, no signs of distress    Previous ER lab: Hgb 6.3 (base 10-12), hematocrit 21.3, platelet 150.  Cr 2.2 (base 1.4-1.8)  PT/INR: 13.3/1.2.    Troponin 0.014, , sodium 137, potassium 4.2, chloride 107, CO2 23, BUN 31, creatinine 2.2, glucose 187, albumin 3.6, AST 11, ALT 7, INR 1.2, stool for occult blood positive, low white blood cells 2.95      Previous ER meds:  IV fluid along, Protonix bolus and infusion.  Packed red blood cells are ordered, awaiting transfusion.  He has 2 peripheral IVs in place.       Imaging a previous hospital:  Chest x-ray had no acute chest disease identified.  Left lung nodule noted on the recent CT chest without contrast from April 21 was not clearly visible on the portable chest x-ray.     EKG showed atrial fibrillation with ventricular rate 74.    Overview/Hospital Course:  No notes on file    Interval History:  Doing well today, denies any further black or bloody bowel movements.  States he occasionally gets cramps in his legs but otherwise no acute complaints.  He does not recall why he was switched from apixaban to Coumadin; this is also not clear through my extensive chart review.    Review of Systems  Objective:     Vital Signs (Most Recent):  Temp: 98.6 °F (37 °C) (04/28/25 1112)  Pulse: 70 (04/28/25 1112)  Resp: 18 (04/28/25 1112)  BP: (!) 143/71 (04/28/25 1112)  SpO2: 99 % (04/28/25 1112) Vital Signs (24h Range):  Temp:  [98.2 °F (36.8 °C)-98.7 °F (37.1 °C)] 98.6 °F (37 °C)  Pulse:  [54-88] 70  Resp:  [18-31] 18  SpO2:  [96 %-100 %] 99 %  BP: (110-173)/(53-89) 143/71     Weight: 71.3 kg (157 lb 3 oz)  Body mass index is 23.9 kg/m².    Intake/Output Summary (Last 24 hours) at 4/28/2025 1200  Last data filed at 4/28/2025 0449  Gross per 24 hour   Intake --   Output 500 ml   Net -500 ml         Physical Exam  Vitals reviewed.   Constitutional:       General: He is not in acute distress.     Appearance: He is well-developed. He is not diaphoretic.   HENT:      Head: Normocephalic and atraumatic.      Nose: Nose normal.   Eyes:      General: No scleral icterus.     Pupils: Pupils are equal, round, and reactive to light.   Neck:      Trachea: No tracheal deviation.   Cardiovascular:      Rate and Rhythm: Normal rate. Rhythm irregular.      Heart sounds: Normal heart sounds. No murmur heard.     No friction rub. No gallop.    Pulmonary:      Effort: Pulmonary effort is normal. No respiratory distress.      Breath sounds: Normal breath sounds.   Abdominal:      General: There is no distension.      Palpations: Abdomen is soft. There is no mass.      Tenderness: There is no abdominal tenderness.      Hernia: No hernia is present.   Musculoskeletal:         General: No deformity.      Cervical back: Normal range of motion.   Skin:     General: Skin is warm and dry.      Findings: No rash.   Neurological:      Mental Status: He is alert and oriented to person, place, and time.   Psychiatric:         Behavior: Behavior normal.               Significant Labs: All pertinent labs within the past 24 hours have been reviewed.    Significant Imaging: I have reviewed all pertinent imaging results/findings within the past 24 hours.      Assessment & Plan  GI bleed  Patient's hemorrhage is due to gastrointestinal bleed, this bleeding.  Patient is on anticoagulant. Patients most recent Hgb, Hct, platelets, and INR are listed below.  Recent Labs     04/27/25  1257 04/27/25  2016 04/28/25  0631   HGB 6.3* 7.7* 7.3*   HCT 21.3* 25.9* 24.9*    142* 155   INR 1.2  --   --      Plan  - Will trend hemoglobin/hematocrit Daily  - Will monitor and correct any coagulation defects  - Will transfuse if Hgb is <7g/dl (<8g/dl in cases of active ACS) or if patient has rapid bleeding leading to hemodynamic instability  - we will monitor and trend  -GI pathway, pantoprazole 40 mg b.i.d. and IV ceftriaxone for now given history of cirrhosis; no varices  -NPO after midnight with plans for EGD today  -GI consulted  A-fib  Patient has long standing persistent (>12 months) atrial fibrillation.   -on home Coumadin, we will touch base with PCP to verify medication but hold for now Pulse  Min: 54  Max: 88     Antiarrhythmics   Holding blood pressure medications for now in the setting of acute GI bleed    Anticoagulants       Plan  - Replete lytes with a goal of K>4, Mg  >2  - Patient is not anticoagulated due to GI bleed  - Patient's afib is currently controlled  - currently holding Coumadin and would consider switch to DOAC given lower bleeding risk with apixaban in patients with cirrhosis  -telemetry monitor    Coagulopathy  -holding Coumadin for now and may want to consider changing medication is there is no clear indication for Coumadin rather than apixaban  History of primary malignant neoplasm of liver  -patient with complaints of abdominal pain to the epigastric region and tenderness to touch    PAD (peripheral artery disease)  History of stent to right lower extremity  -encourage exercise  -dietary modification:  Healthy heart diet and return to oral intake  -manage blood pressure  -current anticoagulant on hold for right now due to GI bleed  -bilateral arterial ultrasound: Pending    HTN (hypertension)  Patient's blood pressure range in the last 24 hours was: BP  Min: 110/68  Max: 173/70.The patient's inpatient anti-hypertensive regimen is listed below:  Current Antihypertensives       Plan  - BP is controlled, no changes needed to their regimen  - we will adjust as needed  -currently holding blood pressure medication  DM (diabetes mellitus)  Patient's FSGs are uncontrolled due to hyperglycemia on current medication regimen.  Last A1c reviewed-   Lab Results   Component Value Date    HGBA1C 6.3 (H) 04/27/2025     Most recent fingerstick glucose reviewed-   Recent Labs   Lab 04/27/25  2058 04/28/25  0629 04/28/25  1111   POCTGLUCOSE 93 99 63*     Current correctional scale  Low  Maintain anti-hyperglycemic dose as follows-   Antihyperglycemics (From admission, onward)      Start     Stop Route Frequency Ordered    04/27/25 2144  insulin aspart U-100 pen 0-5 Units         -- SubQ Before meals & nightly PRN 04/27/25 2044          Hold Oral hypoglycemics while patient is in the hospital.    -Accu-Cheks AC and HS  -NPO after midnight, thank you 6 Accu-Checks    Pulmonary  nodule  -noted in history, appears stable, outpatient CT chest  -denies smoking    Elevated brain natriuretic peptide (BNP) level  -no known cardiac history other than hypertension, on physical exam no elevated JVD, no bilateral lower extremity edema,lungs clear by shortness of breath on exertion reported  -elevated   -pending echo  -cardiac monitor  -I's and O's    VTE Risk Mitigation (From admission, onward)           Ordered     IP VTE HIGH RISK PATIENT  Once         04/27/25 1932     Place sequential compression device  Until discontinued         04/27/25 1932                    Discharge Planning   HILARIO: 4/28/2025     Code Status: Full Code   Medical Readiness for Discharge Date:   Discharge Plan A: Home with family                        Darwin Cosme MD  Department of Hospital Medicine   [place] [facility]

## 2025-04-28 NOTE — PLAN OF CARE
EGD completed and largely unremarkable. Discussed with patient pursuing colonoscopy inpatient vs outpatient in addition to recommending he return to surgical oncology and medical oncology to discuss adjuvant chemo and surveillance of HCC. He states he would prefer to focus on the liver for now, and will pursue colonoscopy at a later time.     Melba Batista, DO   [employer] GI PGY IV

## 2025-04-28 NOTE — PROVATION PATIENT INSTRUCTIONS
Discharge Summary/Instructions after an Endoscopic Procedure  Patient Name: Oneil Meza  Patient MRN: [id number]  Patient YOB: 1950 Monday, April 28, 2025  Korey Bettencourt MD  Dear patient,  As a result of recent federal legislation (The Federal Cures Act), you may   receive lab or pathology results from your procedure in your [facility]   account before your physician is able to contact you. Your physician or   their representative will relay the results to you with their   recommendations at their soonest availability.  Thank you,  Your health is very important to us during the Covid Crisis. Following your   procedure today, you will receive a daily text for 2 weeks asking about   signs or symptoms of Covid 19.  Please respond to this text when you   receive it so we can follow up and keep you as safe as possible.   RESTRICTIONS:  During your procedure today, you received medications for sedation.  These   medications may affect your judgment, balance and coordination.  Therefore,   for 24 hours, you have the following restrictions:   - DO NOT drive a car, operate machinery, make legal/financial decisions,   sign important papers or drink alcohol.    ACTIVITY:  Today: no heavy lifting, straining or running due to procedural   sedation/anesthesia.  The following day: return to full activity including work.  DIET:  Eat and drink normally unless instructed otherwise.     TREATMENT FOR COMMON SIDE EFFECTS:  - Mild abdominal pain, nausea, belching, bloating or excessive gas:  rest,   eat lightly and use a heating pad.  - Sore Throat: treat with throat lozenges and/or gargle with warm salt   water.  - Because air was used during the procedure, expelling large amounts of air   from your rectum or belching is normal.  - If a bowel prep was taken, you may not have a bowel movement for 1-3 days.    This is normal.  SYMPTOMS TO WATCH FOR AND REPORT TO YOUR PHYSICIAN:  1. Abdominal pain or bloating, other than  gas cramps.  2. Chest pain.  3. Back pain.  4. Signs of infection such as: chills or fever occurring within 24 hours   after the procedure.  5. Rectal bleeding, which would show as bright red, maroon, or black stools.   (A tablespoon of blood from the rectum is not serious, especially if   hemorrhoids are present.)  6. Vomiting.  7. Weakness or dizziness.  GO DIRECTLY TO THE NEAREST EMERGENCY ROOM IF YOU HAVE ANY OF THE FOLLOWING:      Difficulty breathing              Chills and/or fever over 101 F   Persistent vomiting and/or vomiting blood   Severe abdominal pain   Severe chest pain   Black, tarry stools   Bleeding- more than one tablespoon   Any other symptom or condition that you feel may need urgent attention  Your doctor recommends these additional instructions:  If any biopsies were taken, your doctors clinic will contact you in 1 to 2   weeks with any results.  - Return patient to hospital dai for ongoing care.   - Resume previous diet.   - Continue present medications.   - Will discuss possible colonoscopy with patient, offer inpatient vs   outpatient (although he has brown stool on rectal exam, the yield of   colonoscopy inpatient may be low )  For questions, problems or results please call your physician - Korey Bettencourt MD.  EMERGENCY PHONE NUMBER: [phone],  LAB RESULTS: [phone]  IF A COMPLICATION OR EMERGENCY SITUATION ARISES AND YOU ARE UNABLE TO REACH   YOUR PHYSICIAN - GO DIRECTLY TO THE EMERGENCY ROOM.  Korey Bettencourt MD  4/28/2025 3:36:06 PM  This report has been verified and signed electronically.  Dear patient,  As a result of recent federal legislation (The Federal Cures Act), you may   receive lab or pathology results from your procedure in your [facility]   account before your physician is able to contact you. Your physician or   their representative will relay the results to you with their   recommendations at their soonest availability.  Thank you,  PROVATION

## 2025-04-28 NOTE — ASSESSMENT & PLAN NOTE
History of stent to right lower extremity  -encourage exercise  -dietary modification:  Healthy heart diet and return to oral intake  -manage blood pressure  -corona anticoagulant but on hold for right now due to GI bleed  -bilateral arterial ultrasound: Pending

## 2025-04-28 NOTE — ASSESSMENT & PLAN NOTE
History of stent to right lower extremity  -encourage exercise  -dietary modification:  Healthy heart diet and return to oral intake  -manage blood pressure  -current anticoagulant on hold for right now due to GI bleed  -bilateral arterial ultrasound: Pending

## 2025-04-28 NOTE — ASSESSMENT & PLAN NOTE
Patient's hemorrhage is due to gastrointestinal bleed, this bleeding.  Patient is on anticoagulant. Patients most recent Hgb, Hct, platelets, and INR are listed below.  Recent Labs     04/27/25  1257 04/27/25  2016 04/28/25  0631   HGB 6.3* 7.7* 7.3*   HCT 21.3* 25.9* 24.9*    142* 155   INR 1.2  --   --      Plan  - Will trend hemoglobin/hematocrit Daily  - Will monitor and correct any coagulation defects  - Will transfuse if Hgb is <7g/dl (<8g/dl in cases of active ACS) or if patient has rapid bleeding leading to hemodynamic instability  - we will monitor and trend  -GI pathway, pantoprazole 40 mg b.i.d. and IV ceftriaxone for now given history of cirrhosis; no varices  -NPO after midnight with plans for EGD today  -GI consulted

## 2025-04-28 NOTE — PLAN OF CARE
Problem: Adult Inpatient Plan of Care  Goal: Plan of Care Review  Outcome: Progressing     VIRTUAL NURSE:  Cued into patient's room.  Permission received per patient to turn camera to view patient.  Introduced as VN for night shift that will be working with floor nurse and nursing assistant.  Educated patient on VN's role in patient care.  Plan of care reviewed with patient.  Education per flowsheet.   Informed patient that staff will round on them every 2 hours but to use call light for any other needs they may have; informed of fall risk and fall precautions.  Patient verbalized understanding.  Call light within reach; bed siderails up x2.  Opportunity given for questions and questions answered.  Admission assessment questions answered.  Patient denies complaints or any needs at this time.  Instructed to call for assistance.  Will cont to monitor and intervene as needed.    Labs, notes, orders, and careplan initiated.       04/27/25 1907   Admission Complete   Admission Complete by VN Complete      04/27/25 1907   Patient Request   Patient Requested diet   Nurse Notification   Bedside Nurse Notified? Yes   Name of Bedside Nurse MITZY Reagan   Provider Notification   Provider Notified? Yes   Name of Provider Dr. Castro   Provider Notification Method Secure Chat   Provider Notified Of Patient Request   Admission   Initial VN Admission Questions Complete   Communication Issues? Technical Issue   Shift   Virtual Nurse - Rounding Complete   Pain Management Interventions pain management plan reviewed with patient/caregiver   Virtual Nurse - Patient Verbalized Approval Of Camera Use;VN Rounding   Type of Frequent Check   Type Patient Rounds   Safety/Activity   Patient Rounds bed in low position;placement of personal items at bedside;bed wheels locked;call light in patient/parent reach;visualized patient;clutter free environment maintained   Safety Promotion/Fall Prevention assistive device/personal item within  reach;commode/urinal/bedpan at bedside;high risk medications identified;Fall Risk reviewed with patient/family;diversional activities provided;medications reviewed;nonskid shoes/socks when out of bed;room near unit station;side rails raised x 3;supervised activity;Supervised toileting - stay within arms reach;instructed to call staff for mobility   Safety Precautions emergency equipment at bedside   Positioning   Body Position neutral body alignment;neutral head position;position changed independently   Head of Bed (HOB) Positioning HOB at 60-90 degrees   Pain/Comfort/Sleep   Preferred Pain Scale number (Numeric Rating Pain Scale)   Comfort/Acceptable Pain Level 0   Pain Rating (0-10): Rest 0   Sleep/Rest/Relaxation awake

## 2025-04-29 ENCOUNTER — CLINICAL SUPPORT (OUTPATIENT)
Dept: SMOKING CESSATION | Facility: CLINIC | Age: 75
End: 2025-04-29

## 2025-04-29 VITALS
HEIGHT: 68 IN | RESPIRATION RATE: 18 BRPM | TEMPERATURE: 98 F | OXYGEN SATURATION: 99 % | BODY MASS INDEX: 23.82 KG/M2 | SYSTOLIC BLOOD PRESSURE: 131 MMHG | DIASTOLIC BLOOD PRESSURE: 70 MMHG | WEIGHT: 157.19 LBS | HEART RATE: 59 BPM

## 2025-04-29 DIAGNOSIS — F17.210 CIGARETTE SMOKER: Primary | ICD-10-CM

## 2025-04-29 LAB
ABSOLUTE EOSINOPHIL (OHS): 0.07 K/UL
ABSOLUTE MONOCYTE (OHS): 0.58 K/UL (ref 0.3–1)
ABSOLUTE NEUTROPHIL COUNT (OHS): 1.83 K/UL (ref 1.8–7.7)
ANION GAP (OHS): 7 MMOL/L (ref 8–16)
BASOPHILS # BLD AUTO: 0.01 K/UL
BASOPHILS NFR BLD AUTO: 0.3 %
BUN SERPL-MCNC: 25 MG/DL (ref 8–23)
CALCIUM SERPL-MCNC: 8.9 MG/DL (ref 8.7–10.5)
CHLORIDE SERPL-SCNC: 108 MMOL/L (ref 95–110)
CO2 SERPL-SCNC: 22 MMOL/L (ref 23–29)
CREAT SERPL-MCNC: 2.1 MG/DL (ref 0.5–1.4)
ERYTHROCYTE [DISTWIDTH] IN BLOOD BY AUTOMATED COUNT: 17.1 % (ref 11.5–14.5)
GFR SERPLBLD CREATININE-BSD FMLA CKD-EPI: 32 ML/MIN/1.73/M2
GLUCOSE SERPL-MCNC: 129 MG/DL (ref 70–110)
HCT VFR BLD AUTO: 24.9 % (ref 40–54)
HGB BLD-MCNC: 7.5 GM/DL (ref 14–18)
IMM GRANULOCYTES # BLD AUTO: 0 K/UL (ref 0–0.04)
IMM GRANULOCYTES NFR BLD AUTO: 0 % (ref 0–0.5)
LYMPHOCYTES # BLD AUTO: 0.71 K/UL (ref 1–4.8)
MCH RBC QN AUTO: 22.6 PG (ref 27–31)
MCHC RBC AUTO-ENTMCNC: 30.1 G/DL (ref 32–36)
MCV RBC AUTO: 75 FL (ref 82–98)
NUCLEATED RBC (/100WBC) (OHS): 0 /100 WBC
PLATELET # BLD AUTO: 156 K/UL (ref 150–450)
PMV BLD AUTO: 11.1 FL (ref 9.2–12.9)
POCT GLUCOSE: 123 MG/DL (ref 70–110)
POCT GLUCOSE: 139 MG/DL (ref 70–110)
POCT GLUCOSE: 193 MG/DL (ref 70–110)
POTASSIUM SERPL-SCNC: 4 MMOL/L (ref 3.5–5.1)
RBC # BLD AUTO: 3.32 M/UL (ref 4.6–6.2)
RELATIVE EOSINOPHIL (OHS): 2.2 %
RELATIVE LYMPHOCYTE (OHS): 22.2 % (ref 18–48)
RELATIVE MONOCYTE (OHS): 18.1 % (ref 4–15)
RELATIVE NEUTROPHIL (OHS): 57.2 % (ref 38–73)
SODIUM SERPL-SCNC: 137 MMOL/L (ref 136–145)
WBC # BLD AUTO: 3.2 K/UL (ref 3.9–12.7)

## 2025-04-29 PROCEDURE — 63600175 PHARM REV CODE 636 W HCPCS: Performed by: HOSPITALIST

## 2025-04-29 PROCEDURE — 85025 COMPLETE CBC W/AUTO DIFF WBC: CPT

## 2025-04-29 PROCEDURE — 3E0234Z INTRODUCTION OF SERUM, TOXOID AND VACCINE INTO MUSCLE, PERCUTANEOUS APPROACH: ICD-10-PCS | Performed by: FAMILY MEDICINE

## 2025-04-29 PROCEDURE — G0009 ADMIN PNEUMOCOCCAL VACCINE: HCPCS | Performed by: FAMILY MEDICINE

## 2025-04-29 PROCEDURE — 99406 BEHAV CHNG SMOKING 3-10 MIN: CPT | Mod: ,,,

## 2025-04-29 PROCEDURE — 90471 IMMUNIZATION ADMIN: CPT | Performed by: FAMILY MEDICINE

## 2025-04-29 PROCEDURE — 36415 COLL VENOUS BLD VENIPUNCTURE: CPT

## 2025-04-29 PROCEDURE — 63600175 PHARM REV CODE 636 W HCPCS

## 2025-04-29 PROCEDURE — 90677 PCV20 VACCINE IM: CPT | Performed by: FAMILY MEDICINE

## 2025-04-29 PROCEDURE — 80048 BASIC METABOLIC PNL TOTAL CA: CPT

## 2025-04-29 PROCEDURE — 63600175 PHARM REV CODE 636 W HCPCS: Performed by: FAMILY MEDICINE

## 2025-04-29 RX ORDER — AMLODIPINE BESYLATE 10 MG/1
10 TABLET ORAL DAILY
Qty: 90 TABLET | Refills: 3 | Status: SHIPPED | OUTPATIENT
Start: 2025-04-29 | End: 2026-04-29

## 2025-04-29 RX ADMIN — PNEUMOCOCCAL 20-VALENT CONJUGATE VACCINE 0.5 ML
2.2; 2.2; 2.2; 2.2; 2.2; 2.2; 2.2; 2.2; 2.2; 2.2; 2.2; 2.2; 2.2; 2.2; 2.2; 2.2; 4.4; 2.2; 2.2; 2.2 INJECTION, SUSPENSION INTRAMUSCULAR at 01:04

## 2025-04-29 RX ADMIN — CEFTRIAXONE SODIUM 1 G: 1 INJECTION, POWDER, FOR SOLUTION INTRAMUSCULAR; INTRAVENOUS at 08:04

## 2025-04-29 RX ADMIN — PANTOPRAZOLE SODIUM 40 MG: 40 INJECTION, POWDER, FOR SOLUTION INTRAVENOUS at 08:04

## 2025-04-29 NOTE — PLAN OF CARE
Discharge orders noted. No DME or Home Health ordered.  [facility] Family will transport home at discharge.     Awaiting Med Rec to be completed.    PCP follow-up scheduled.     met with patient to discuss discharge plans. PCP appointment reviewed. Patient reports he will contact his family for transport home when ready.     Other Contacts    Name Relation Home Work Carlos Escudero [phone]       Korey Bettencourt MD  Gastroenterology [phone] [address] [facility] [address] [place] [address]      Next Steps: Follow up  Instructions: To discuss Colonoscopy    [facility] [place]   [phone] [facility] [place] [address]     Next Steps: Follow up on 5/1/2025  Instructions: He is scheduled with NP Margaret Salazar on Thursday 5/1/25 @ 1:10pm at the [facility]. Primary Care Physician        04/29/25 0954   Final Note   Assessment Type Final Discharge Note   Anticipated Discharge Disposition Home   Hospital Resources/Appts/Education Provided Appointments scheduled and added to AVS   Post-Acute Status   Discharge Delays None known at this time     Jaycee Auguste RN    [phone]

## 2025-04-29 NOTE — ASSESSMENT & PLAN NOTE
Patient's hemorrhage is due to gastrointestinal bleed, this bleeding.  Patient is on anticoagulant. Patients most recent Hgb, Hct, platelets, and INR are listed below.  Recent Labs     04/27/25  1257 04/27/25  2016 04/28/25  0631 04/29/25  0350   HGB 6.3* 7.7* 7.3* 7.5*   HCT 21.3* 25.9* 24.9* 24.9*    142* 155 156   INR 1.2  --   --   --      Plan  - Will trend hemoglobin/hematocrit Daily  - Will monitor and correct any coagulation defects  - Will transfuse if Hgb is <7g/dl (<8g/dl in cases of active ACS) or if patient has rapid bleeding leading to hemodynamic instability  - we will monitor and trend  -GI pathway, pantoprazole 40 mg b.i.d. and IV ceftriaxone for now given history of cirrhosis; no varices  -NPO after midnight with plans for EGD today  -GI consulted- EGD unremarkable.  Patient defer colonoscopy to outpatient  Patient want to focus on follow-up with Surgical and Medical Oncology for HCC surveillance and possible adjuvant chemo

## 2025-04-29 NOTE — ASSESSMENT & PLAN NOTE
-holding Coumadin for now and may want to consider changing medication is there is no clear indication for Coumadin rather than apixaban  Switch to Eliquis at discharge

## 2025-04-29 NOTE — ASSESSMENT & PLAN NOTE
Patient's blood pressure range in the last 24 hours was: BP  Min: 92/54  Max: 143/71.The patient's inpatient anti-hypertensive regimen is listed below:  Current Antihypertensives       Plan  - BP is controlled, no changes needed to their regimen  - we will adjust as needed  -currently holding blood pressure medication

## 2025-04-29 NOTE — ASSESSMENT & PLAN NOTE
History of stent to right lower extremity  -encourage exercise  -dietary modification:  Healthy heart diet and return to oral intake  -manage blood pressure  -current anticoagulant on hold for right now due to GI bleed  -bilateral arterial ultrasound:   No hemodynamically significant stenosis demonstrated in the right or left lower extremity arterial system.     Patent fem-pop bypass on the right.  Patent SFA stent on the left.

## 2025-04-29 NOTE — ASSESSMENT & PLAN NOTE
Patient has long standing persistent (>12 months) atrial fibrillation.   -on home Coumadin, we will touch base with PCP to verify medication but hold for now Pulse  Min: 52  Max: 96     Antiarrhythmics   Holding blood pressure medications for now in the setting of acute GI bleed    Anticoagulants       Plan  - Replete lytes with a goal of K>4, Mg >2  - Patient is not anticoagulated due to GI bleed  - Patient's afib is currently controlled  - currently holding Coumadin and would consider switch to DOAC given lower bleeding risk with apixaban in patients with cirrhosis  -telemetry monitor

## 2025-04-29 NOTE — PROGRESS NOTES
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  He gives an account of feeling light-headed, dizzy, faint feeling, nausea this am, contant leg cramps, and dark stools for the last few days. He has been getting SOB upon exertion. He denies CP, fever, chills. He gives an account of having dark stools for the last few days and in the previous ER +guaiac-positive brown stool. He does take coumadin but not sure about the dosage and give an account of taking it BID but forgets sometimes but states he took one today. Patient unsure about name of daily medications he takes. He is AAO4, able to questions appropriately.  Patient was transferred to [facility] for GI consult under hospital medicine service.    ROS: + short of breath on exertion, bilateral lower extremity cramps, dizziness, faint feeling, dark stools  PE:  Bilateral lower extremity with +1 pedal pulse the right with, +1 dorsalis pulse to left foot, no signs of distress    Previous ER lab: Hgb 6.3 (base 10-12), hematocrit 21.3, platelet 150.  Cr 2.2 (base 1.4-1.8)  PT/INR: 13.3/1.2.    Troponin 0.014, , sodium 137, potassium 4.2, chloride 107, CO2 23, BUN 31, creatinine 2.2, glucose 187, albumin 3.6, AST 11, ALT 7, INR 1.2, stool for occult blood positive, low white blood cells 2.95      Previous ER meds:  IV fluid along, Protonix bolus and infusion.  Packed red blood cells are ordered, awaiting transfusion.  He has 2 peripheral IVs in place.       Imaging a previous hospital:  Chest x-ray had no acute chest disease identified.  Left lung nodule noted on the recent CT chest without contrast from April 21 was not clearly visible on the portable chest x-ray.     EKG showed atrial fibrillation with ventricular rate 74.    Overview/Hospital Course:  No notes on file    Interval History:  Awake, alert, no new complaint  S/p EGD unremarkable.  Patient defer call us endoscopy to outpatient and focus on following up with medical oncology for surveillance on H cc    Review of Systems   Constitutional:  Negative for activity change.   Respiratory:  Negative for shortness of breath.    Psychiatric/Behavioral:  Negative for agitation.      Objective:     Vital Signs (Most Recent):  Temp: 98.2 °F (36.8 °C) (04/29/25 0738)  Pulse: 61 (04/29/25 0814)  Resp: 18 (04/29/25 0738)  BP: 126/73 (04/29/25 0738)  SpO2: 100 % (04/29/25 0738) Vital Signs (24h Range):  Temp:  [97.7 °F (36.5 °C)-98.6 °F (37 °C)] 98.2 °F (36.8 °C)  Pulse:  [52-96] 61  Resp:  [11-18] 18  SpO2:  [97 %-100 %] 100 %  BP: ()/(54-95) 126/73     Weight: 71.3 kg (157 lb 3 oz)  Body mass index is 23.9 kg/m².    Intake/Output Summary (Last 24 hours) at 4/29/2025 1104  Last data filed at 4/29/2025 0852  Gross per 24 hour   Intake 240 ml   Output 450 ml   Net -210 ml         Physical Exam  Vitals reviewed.   Constitutional:       General: He is not in acute distress.     Appearance: He is well-developed. He is not diaphoretic.   HENT:      Head: Normocephalic and atraumatic.   Neck:      Trachea: No tracheal deviation.   Cardiovascular:      Rate and Rhythm: Normal rate. Rhythm irregular.      Heart sounds: Normal heart sounds. No murmur heard.     No friction rub. No gallop.   Pulmonary:      Effort: Pulmonary effort is normal. No respiratory  "distress.      Breath sounds: Normal breath sounds.   Abdominal:      General: There is no distension.      Palpations: Abdomen is soft. There is no mass.      Tenderness: There is no abdominal tenderness.      Hernia: No hernia is present.   Musculoskeletal:         General: No deformity.      Cervical back: Normal range of motion.   Skin:     General: Skin is warm and dry.      Findings: No rash.   Neurological:      Mental Status: He is alert and oriented to person, place, and time.   Psychiatric:         Behavior: Behavior normal.               Significant Labs: A1C:   Recent Labs   Lab 04/27/25  2146   HGBA1C 6.3*     ABGs: No results for input(s): "PH", "PCO2", "HCO3", "POCSATURATED", "BE", "TOTALHB", "COHB", "METHB", "O2HB", "POCFIO2", "PO2" in the last 48 hours.  Blood Culture: No results for input(s): "LABBLOO" in the last 48 hours.  CBC:   Recent Labs   Lab 04/27/25  2016 04/28/25  0631 04/29/25  0350   WBC 4.24 3.15* 3.20*   HGB 7.7* 7.3* 7.5*   HCT 25.9* 24.9* 24.9*   * 155 156     CMP:   Recent Labs   Lab 04/27/25  1257 04/28/25  0631 04/29/25  0350    136 137   K 4.2 4.0 4.0    109 108   CO2 23 20* 22*   * 72 129*   BUN 31* 24* 25*   CREATININE 2.2* 1.9* 2.1*   CALCIUM 9.0 8.9 8.9   PROT 7.5  --   --    ALBUMIN 3.6  --   --    BILITOT 0.7  --   --    ALKPHOS 44  --   --    AST 11  --   --    ALT 7*  --   --    ANIONGAP 7* 7* 7*     Coagulation:   Recent Labs   Lab 04/27/25  1257   INR 1.2   APTT 24.1     Lactic Acid: No results for input(s): "LACTATE" in the last 48 hours.  Lipase: No results for input(s): "LIPASE" in the last 48 hours.  Lipid Panel: No results for input(s): "CHOL", "HDL", "LDLCALC", "TRIG", "CHOLHDL" in the last 48 hours.  Magnesium:   Recent Labs   Lab 04/28/25  0631   MG 2.0     Troponin:   Recent Labs   Lab 04/27/25  1257   TROPONINI 0.014     TSH: No results for input(s): "TSH" in the last 4320 hours.  Urine Culture: No results for input(s): "LABURIN" in " "the last 48 hours.  Urine Studies: No results for input(s): "COLORU", "APPEARANCEUA", "PHUR", "SPECGRAV", "PROTEINUA", "GLUCUA", "KETONESU", "BILIRUBINUA", "OCCULTUA", "NITRITE", "UROBILINOGEN", "LEUKOCYTESUR", "RBCUA", "WBCUA", "BACTERIA", "SQUAMEPITHEL", "HYALINECASTS" in the last 48 hours.    Invalid input(s): "WRIGHTSUR"    Significant Imaging: I have reviewed all pertinent imaging results/findings within the past 24 hours.      Assessment & Plan  GI bleed  Patient's hemorrhage is due to gastrointestinal bleed, this bleeding.  Patient is on anticoagulant. Patients most recent Hgb, Hct, platelets, and INR are listed below.  Recent Labs     04/27/25  1257 04/27/25 2016 04/28/25  0631 04/29/25  0350   HGB 6.3* 7.7* 7.3* 7.5*   HCT 21.3* 25.9* 24.9* 24.9*    142* 155 156   INR 1.2  --   --   --      Plan  - Will trend hemoglobin/hematocrit Daily  - Will monitor and correct any coagulation defects  - Will transfuse if Hgb is <7g/dl (<8g/dl in cases of active ACS) or if patient has rapid bleeding leading to hemodynamic instability  - we will monitor and trend  -GI pathway, pantoprazole 40 mg b.i.d. and IV ceftriaxone for now given history of cirrhosis; no varices  -NPO after midnight with plans for EGD today  -GI consulted- EGD unremarkable.  Patient defer colonoscopy to outpatient  Patient want to focus on follow-up with Surgical and Medical Oncology for HCC surveillance and possible adjuvant chemo  A-fib  Patient has long standing persistent (>12 months) atrial fibrillation.   -on home Coumadin, we will touch base with PCP to verify medication but hold for now Pulse  Min: 52  Max: 96     Antiarrhythmics   Holding blood pressure medications for now in the setting of acute GI bleed    Anticoagulants       Plan  - Replete lytes with a goal of K>4, Mg >2  - Patient is not anticoagulated due to GI bleed  - Patient's afib is currently controlled  - currently holding Coumadin and would consider switch to DOAC given " lower bleeding risk with apixaban in patients with cirrhosis  -telemetry monitor    Coagulopathy  -holding Coumadin for now and may want to consider changing medication is there is no clear indication for Coumadin rather than apixaban  History of primary malignant neoplasm of liver  -patient with complaints of abdominal pain to the epigastric region and tenderness to touch    PAD (peripheral artery disease)  History of stent to right lower extremity  -encourage exercise  -dietary modification:  Healthy heart diet and return to oral intake  -manage blood pressure  -current anticoagulant on hold for right now due to GI bleed  -bilateral arterial ultrasound: Pending    HTN (hypertension)  Patient's blood pressure range in the last 24 hours was: BP  Min: 92/54  Max: 143/71.The patient's inpatient anti-hypertensive regimen is listed below:  Current Antihypertensives       Plan  - BP is controlled, no changes needed to their regimen  - we will adjust as needed  -currently holding blood pressure medication  DM (diabetes mellitus)  Patient's FSGs are uncontrolled due to hyperglycemia on current medication regimen.  Last A1c reviewed-   Lab Results   Component Value Date    HGBA1C 6.3 (H) 04/27/2025     Most recent fingerstick glucose reviewed-   Recent Labs   Lab 04/28/25  1203 04/28/25  1625 04/29/25  0045 04/29/25  0610   POCTGLUCOSE 117* 78 123* 139*     Current correctional scale  Low  Maintain anti-hyperglycemic dose as follows-   Antihyperglycemics (From admission, onward)      Start     Stop Route Frequency Ordered    04/27/25 2144  insulin aspart U-100 pen 0-5 Units         -- SubQ Before meals & nightly PRN 04/27/25 2044          Hold Oral hypoglycemics while patient is in the hospital.    -Accu-Cheks AC and HS  -NPO after midnight, thank you 6 Accu-Checks    Pulmonary nodule  -noted in history, appears stable, outpatient CT chest  -denies smoking    Elevated brain natriuretic peptide (BNP) level  -no known cardiac  history other than hypertension, on physical exam no elevated JVD, no bilateral lower extremity edema,lungs clear by shortness of breath on exertion reported  -elevated   -pending echo  -cardiac monitor  -I's and O's    VTE Risk Mitigation (From admission, onward)           Ordered     IP VTE HIGH RISK PATIENT  Once         04/27/25 1932     Place sequential compression device  Until discontinued         04/27/25 1932                    Discharge Planning   HILARIO: 4/29/2025     Code Status: Full Code   Medical Readiness for Discharge Date: 4/29/2025  Discharge Plan A: Home with family   Discharge Delays: None known at this time                    Patricia Flynn MD  Department of Hospital Medicine   [facility] - Telemetry

## 2025-04-29 NOTE — PLAN OF CARE
VN reviewed discharge instructions with pt. Using teach back method.  AVS printed and handed to pt by bedside nurse.  Reviewed follow-up appointments, medications, diet, and importance of medication compliance.  Reviewed home care instructions, treatment plan, self-management, and when to seek medical attention.  Allowed time for questions.  All questions answered.  Patient verbalized complete understanding of discharge instructions and voices no concerns.     Discharge instructions complete.  Pt waiting on ride home. Bedside nurse notified.

## 2025-04-29 NOTE — PROGRESS NOTES
"  Smoking cessation education note: Pt is a former 1 pk/day cigarette smoker ("on and off") x 52 yrs. He states that he quit smoking in 2016 and has not relapsed. EMR updated to reflect "Former Smoker" status.  "

## 2025-04-29 NOTE — ASSESSMENT & PLAN NOTE
Patient's blood pressure range in the last 24 hours was: BP  Min: 92/54  Max: 137/95.The patient's inpatient anti-hypertensive regimen is listed below:  Current Antihypertensives  amlodipine (NORVASC) tablet, Daily, Oral    Plan  - BP is controlled, no changes needed to their regimen  - we will adjust as needed  -currently holding blood pressure medication

## 2025-04-29 NOTE — ANESTHESIA POSTPROCEDURE EVALUATION
Anesthesia Post Evaluation    Patient: Oneil Meza    Procedure(s) Performed: Procedure(s) (LRB):  EGD (ESOPHAGOGASTRODUODENOSCOPY) (N/A)    Final Anesthesia Type: general      Patient location during evaluation: GI PACU  Patient participation: Yes- Able to Participate  Level of consciousness: awake and alert  Post-procedure vital signs: reviewed and stable  Pain management: adequate  Airway patency: patent    PONV status at discharge: No PONV  Anesthetic complications: no      Cardiovascular status: blood pressure returned to baseline and hemodynamically stable  Respiratory status: unassisted  Hydration status: euvolemic  Follow-up not needed.              Vitals Value Taken Time   /74 04/29/25 03:26   Temp 36.8 °C (98.3 °F) 04/29/25 03:26   Pulse 52 04/29/25 03:38   Resp 18 04/29/25 03:26   SpO2 100 % 04/29/25 03:26         Event Time   Out of Recovery 04/28/2025 16:04:51         Pain/Vel Score: Vel Score: 10 (4/28/2025  4:01 PM)

## 2025-04-29 NOTE — PLAN OF CARE
"  Problem: Adult Inpatient Plan of Care  Goal: Plan of Care Review  Outcome: Progressing     Problem: Adult Inpatient Plan of Care  Goal: Optimal Comfort and Wellbeing  Outcome: Progressing     Problem: Gastrointestinal Bleeding  Goal: Optimal Coping with Acute Illness  Outcome: Progressing     Problem: Anemia  Goal: Anemia Symptom Improvement  Outcome: Progressing     Problem: Diabetes Comorbidity  Goal: Blood Glucose Level Within Targeted Range  Outcome: Progressing     Problem: Oncology Care  Goal: Effective Coping  Outcome: Progressing     .Plan of care reviewed with the patient. Scheduled medicines given and the patient tolerated well. Fall and safety precautions taken and the standard interventions are in place. On Telemetry monitoring with Atrial Vs Sinus arrhythmia, no true "red alarms' noted, no acute distress reported either on the shift. Patient's Accu Check was 123 mg/dl. Advised the patient to call for assistance. Continued monitoring the patient.   "

## 2025-04-29 NOTE — ASSESSMENT & PLAN NOTE
-no known cardiac history other than hypertension, on physical exam no elevated JVD, no bilateral lower extremity edema,lungs clear by shortness of breath on exertion reported  -elevated   TTE    Left Ventricle: The left ventricle is normal in size. Normal wall thickness. Normal wall motion. There is normal systolic function with a visually estimated ejection fraction of 60 - 65%. Grade I diastolic dysfunction.    Right Ventricle: The right ventricle is normal in size. Systolic function is normal.    Left Atrium: Severely dilated measuring 50 mL/m2 Agitated saline study of the atrial septum is positive, consistent with an intracardiac shunt at the atrial level.    Right Atrium: Right atrium is severely dilated.    Mitral Valve: There is mild regurgitation.    Tricuspid Valve: There is mild regurgitation.    Pulmonary Artery: The estimated pulmonary artery systolic pressure is 32 mmHg.   Holding diuretic given renal function    -cardiac monitor  -I's and O's

## 2025-04-29 NOTE — DISCHARGE SUMMARY
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  He gives an account of feeling light-headed, dizzy, faint feeling, nausea this am, contant leg cramps, and dark stools for the last few days. He has been getting SOB upon exertion. He denies CP, fever, chills. He gives an account of having dark stools for the last few days and in the previous ER +guaiac-positive brown stool. He does take coumadin but not sure about the dosage and give an account of taking it BID but forgets sometimes but states he took one today. Patient unsure about name of daily medications he takes. He is AAO4, able to questions appropriately.  Patient was transferred to [place] for GI consult under hospital medicine service.    ROS: + short of breath on exertion, bilateral lower extremity cramps, dizziness, faint feeling, dark stools  PE:  Bilateral lower extremity with +1 pedal pulse the right with, +1 dorsalis pulse to left foot, no signs of distress    Previous ER lab: Hgb 6.3 (base 10-12), hematocrit 21.3, platelet 150.  Cr 2.2 (base 1.4-1.8)  PT/INR: 13.3/1.2.    Troponin 0.014, , sodium 137, potassium 4.2, chloride 107, CO2 23, BUN 31,  creatinine 2.2, glucose 187, albumin 3.6, AST 11, ALT 7, INR 1.2, stool for occult blood positive, low white blood cells 2.95     Previous ER meds:  IV fluid along, Protonix bolus and infusion.  Packed red blood cells are ordered, awaiting transfusion.  He has 2 peripheral IVs in place.       Imaging a previous hospital:  Chest x-ray had no acute chest disease identified.  Left lung nodule noted on the recent CT chest without contrast from April 21 was not clearly visible on the portable chest x-ray.     EKG showed atrial fibrillation with ventricular rate 74.    Procedure(s) (LRB):  EGD (ESOPHAGOGASTRODUODENOSCOPY) (N/A)      Hospital Course:   No notes on file     Goals of Care Treatment Preferences:  Code Status: Full Code      SDOH Screening:  The patient was screened for utility difficulties, food insecurity, transport difficulties, housing insecurity, and interpersonal safety and there were no concerns identified this admission.     Consults:   Consults (From admission, onward)          Status Ordering Provider     Inpatient consult to Gastroenterology  Once        Provider:  (Not yet assigned)    Completed ANNALISA STONE     Inpatient consult to Social Work/Case Management  Once        Provider:  (Not yet assigned)    Completed ORDERS, INSTANT            Assessment & Plan  GI bleed  Patient's hemorrhage is due to gastrointestinal bleed, this bleeding.  Patient is on anticoagulant. Patients most recent Hgb, Hct, platelets, and INR are listed below.  Recent Labs     04/27/25  1257 04/27/25  2016 04/28/25  0631 04/29/25  0350   HGB 6.3* 7.7* 7.3* 7.5*   HCT 21.3* 25.9* 24.9* 24.9*    142* 155 156   INR 1.2  --   --   --      Plan  - Will trend hemoglobin/hematocrit Daily  - Will monitor and correct any coagulation defects  - Will transfuse if Hgb is <7g/dl (<8g/dl in cases of active ACS) or if patient has rapid bleeding leading to hemodynamic instability  - we will monitor and trend  -GI pathway,  pantoprazole 40 mg b.i.d. and IV ceftriaxone for now given history of cirrhosis; no varices  -NPO after midnight with plans for EGD today  -GI consulted- EGD unremarkable.  Patient defer colonoscopy to outpatient  Patient want to focus on follow-up with Surgical and Medical Oncology for HCC surveillance and possible adjuvant chemo  A-fib  Patient has long standing persistent (>12 months) atrial fibrillation.   -on home Coumadin, we will touch base with PCP to verify medication but hold for now Pulse  Min: 52  Max: 96     Antiarrhythmics   Holding blood pressure medications for now in the setting of acute GI bleed    Anticoagulants  apixaban tablet, 2 times daily, Oral    Plan  - Replete lytes with a goal of K>4, Mg >2  - Patient is not anticoagulated due to GI bleed  - Patient's afib is currently controlled  - currently holding Coumadin and would consider switch to DOAC given lower bleeding risk with apixaban in patients with cirrhosis  -telemetry monitor  Switch to Eliquis at discharge    Coagulopathy  -holding Coumadin for now and may want to consider changing medication is there is no clear indication for Coumadin rather than apixaban  Switch to Eliquis at discharge  History of primary malignant neoplasm of liver  -patient with complaints of abdominal pain to the epigastric region and tenderness to touch  Outpatient follow-up with Medical Oncology and Surgical Oncology    PAD (peripheral artery disease)  History of stent to right lower extremity  -encourage exercise  -dietary modification:  Healthy heart diet and return to oral intake  -manage blood pressure  -current anticoagulant on hold for right now due to GI bleed  -bilateral arterial ultrasound:   No hemodynamically significant stenosis demonstrated in the right or left lower extremity arterial system.     Patent fem-pop bypass on the right.  Patent SFA stent on the left.  HTN (hypertension)  Patient's blood pressure range in the last 24 hours was: BP  Min:  92/54  Max: 137/95.The patient's inpatient anti-hypertensive regimen is listed below:  Current Antihypertensives  amlodipine (NORVASC) tablet, Daily, Oral    Plan  - BP is controlled, no changes needed to their regimen  - we will adjust as needed  -currently holding blood pressure medication  DM (diabetes mellitus)  Patient's FSGs are uncontrolled due to hyperglycemia on current medication regimen.  Last A1c reviewed-   Lab Results   Component Value Date    HGBA1C 6.3 (H) 04/27/2025     Most recent fingerstick glucose reviewed-   Recent Labs   Lab 04/28/25  1203 04/28/25  1625 04/29/25  0045 04/29/25  0610   POCTGLUCOSE 117* 78 123* 139*     Current correctional scale  Low  Maintain anti-hyperglycemic dose as follows-   Antihyperglycemics (From admission, onward)      Start     Stop Route Frequency Ordered    04/27/25 2144  insulin aspart U-100 pen 0-5 Units         -- SubQ Before meals & nightly PRN 04/27/25 2044          Hold Oral hypoglycemics while patient is in the hospital.    -Accu-Cheks AC and HS  -NPO after midnight, thank you 6 Accu-Checks    Pulmonary nodule  -noted in history, appears stable, outpatient CT chest  -denies smoking    Elevated brain natriuretic peptide (BNP) level  -no known cardiac history other than hypertension, on physical exam no elevated JVD, no bilateral lower extremity edema,lungs clear by shortness of breath on exertion reported  -elevated   TTE    Left Ventricle: The left ventricle is normal in size. Normal wall thickness. Normal wall motion. There is normal systolic function with a visually estimated ejection fraction of 60 - 65%. Grade I diastolic dysfunction.    Right Ventricle: The right ventricle is normal in size. Systolic function is normal.    Left Atrium: Severely dilated measuring 50 mL/m2 Agitated saline study of the atrial septum is positive, consistent with an intracardiac shunt at the atrial level.    Right Atrium: Right atrium is severely dilated.    Mitral  Valve: There is mild regurgitation.    Tricuspid Valve: There is mild regurgitation.    Pulmonary Artery: The estimated pulmonary artery systolic pressure is 32 mmHg.   Holding diuretic given renal function    -cardiac monitor  -I's and O's    Final Active Diagnoses:    Diagnosis Date Noted POA    PRINCIPAL PROBLEM:  GI bleed [K92.2] 04/27/2025 Yes    PAD (peripheral artery disease) [I73.9] 04/27/2025 Yes    HTN (hypertension) [I10] 04/27/2025 Yes    DM (diabetes mellitus) [E11.9] 04/27/2025 Yes    Pulmonary nodule [R91.1] 04/27/2025 Yes     Chronic    A-fib [I48.91] 04/27/2025 Yes     Chronic    Elevated brain natriuretic peptide (BNP) level [R79.89] 04/27/2025 Yes    History of primary malignant neoplasm of liver [Z85.05] 03/02/2024 Yes    Coagulopathy [D68.9] 03/02/2024 Yes      Problems Resolved During this Admission:       Discharged Condition: stable    Disposition: Home or Self Care    Follow Up:   Follow-up Information       Korey Bettencourt MD Follow up.    Specialty: Gastroenterology  Why: To discuss Colonoscopy  Contact information:  [address]  [address]  [place] [address] [phone]               Paulo Persaud - Follow up.    Why: Follow-Up Requested  Primary Care Physician  Contact information:  [address] [place] [address] [phone]                           Patient Instructions:      Ambulatory referral/consult to Gastroenterology   Standing Status: Future   Referral Priority: Routine Referral Type: Consultation   Referral Reason: Specialty Services Required   Requested Specialty: Gastroenterology   Number of Visits Requested: 1     Ambulatory referral/consult to Hematology / Oncology   Standing Status: Future   Referral Priority: Routine Referral Type: Consultation   Referral Reason: Specialty Services Required   Requested Specialty: Hematology and Oncology   Number of Visits Requested: 1     Diet Cardiac     Activity as tolerated       Significant Diagnostic Studies:  N/A    Pending Diagnostic Studies:       None           Medications:  Reconciled Home Medications:      Medication List        START taking these medications      apixaban 5 mg Tab  Commonly known as: ELIQUIS  Take 1 tablet (5 mg total) by mouth 2 (two) times daily.            CONTINUE taking these medications      amLODIPine 10 MG tablet  Commonly known as: NORVASC  Take 1 tablet (10 mg total) by mouth once daily.            STOP taking these medications      furosemide 20 MG tablet  Commonly known as: LASIX     warfarin 5 MG tablet  Commonly known as: COUMADIN              Indwelling Lines/Drains at time of discharge:   Lines/Drains/Airways       None                   Time spent on the discharge of patient: 35 minutes         Patricia Flynn MD  Department of Hospital Medicine  [place] [facility]

## 2025-04-29 NOTE — SUBJECTIVE & OBJECTIVE
Interval History:  Awake, alert, no new complaint  S/p EGD unremarkable.  Patient defer call us endoscopy to outpatient and focus on following up with medical oncology for surveillance on H cc    Review of Systems   Constitutional:  Negative for activity change.   Respiratory:  Negative for shortness of breath.    Psychiatric/Behavioral:  Negative for agitation.      Objective:     Vital Signs (Most Recent):  Temp: 98.2 °F (36.8 °C) (04/29/25 0738)  Pulse: 61 (04/29/25 0814)  Resp: 18 (04/29/25 0738)  BP: 126/73 (04/29/25 0738)  SpO2: 100 % (04/29/25 0738) Vital Signs (24h Range):  Temp:  [97.7 °F (36.5 °C)-98.6 °F (37 °C)] 98.2 °F (36.8 °C)  Pulse:  [52-96] 61  Resp:  [11-18] 18  SpO2:  [97 %-100 %] 100 %  BP: ()/(54-95) 126/73     Weight: 71.3 kg (157 lb 3 oz)  Body mass index is 23.9 kg/m².    Intake/Output Summary (Last 24 hours) at 4/29/2025 1104  Last data filed at 4/29/2025 0852  Gross per 24 hour   Intake 240 ml   Output 450 ml   Net -210 ml         Physical Exam  Vitals reviewed.   Constitutional:       General: He is not in acute distress.     Appearance: He is well-developed. He is not diaphoretic.   HENT:      Head: Normocephalic and atraumatic.   Neck:      Trachea: No tracheal deviation.   Cardiovascular:      Rate and Rhythm: Normal rate. Rhythm irregular.      Heart sounds: Normal heart sounds. No murmur heard.     No friction rub. No gallop.   Pulmonary:      Effort: Pulmonary effort is normal. No respiratory distress.      Breath sounds: Normal breath sounds.   Abdominal:      General: There is no distension.      Palpations: Abdomen is soft. There is no mass.      Tenderness: There is no abdominal tenderness.      Hernia: No hernia is present.   Musculoskeletal:         General: No deformity.      Cervical back: Normal range of motion.   Skin:     General: Skin is warm and dry.      Findings: No rash.   Neurological:      Mental Status: He is alert and oriented to person, place, and time.  "  Psychiatric:         Behavior: Behavior normal.               Significant Labs: A1C:   Recent Labs   Lab 04/27/25  2146   HGBA1C 6.3*     ABGs: No results for input(s): "PH", "PCO2", "HCO3", "POCSATURATED", "BE", "TOTALHB", "COHB", "METHB", "O2HB", "POCFIO2", "PO2" in the last 48 hours.  Blood Culture: No results for input(s): "LABBLOO" in the last 48 hours.  CBC:   Recent Labs   Lab 04/27/25  2016 04/28/25  0631 04/29/25  0350   WBC 4.24 3.15* 3.20*   HGB 7.7* 7.3* 7.5*   HCT 25.9* 24.9* 24.9*   * 155 156     CMP:   Recent Labs   Lab 04/27/25  1257 04/28/25  0631 04/29/25  0350    136 137   K 4.2 4.0 4.0    109 108   CO2 23 20* 22*   * 72 129*   BUN 31* 24* 25*   CREATININE 2.2* 1.9* 2.1*   CALCIUM 9.0 8.9 8.9   PROT 7.5  --   --    ALBUMIN 3.6  --   --    BILITOT 0.7  --   --    ALKPHOS 44  --   --    AST 11  --   --    ALT 7*  --   --    ANIONGAP 7* 7* 7*     Coagulation:   Recent Labs   Lab 04/27/25  1257   INR 1.2   APTT 24.1     Lactic Acid: No results for input(s): "LACTATE" in the last 48 hours.  Lipase: No results for input(s): "LIPASE" in the last 48 hours.  Lipid Panel: No results for input(s): "CHOL", "HDL", "LDLCALC", "TRIG", "CHOLHDL" in the last 48 hours.  Magnesium:   Recent Labs   Lab 04/28/25  0631   MG 2.0     Troponin:   Recent Labs   Lab 04/27/25  1257   TROPONINI 0.014     TSH: No results for input(s): "TSH" in the last 4320 hours.  Urine Culture: No results for input(s): "LABURIN" in the last 48 hours.  Urine Studies: No results for input(s): "COLORU", "APPEARANCEUA", "PHUR", "SPECGRAV", "PROTEINUA", "GLUCUA", "KETONESU", "BILIRUBINUA", "OCCULTUA", "NITRITE", "UROBILINOGEN", "LEUKOCYTESUR", "RBCUA", "WBCUA", "BACTERIA", "SQUAMEPITHEL", "HYALINECASTS" in the last 48 hours.    Invalid input(s): "WRIGHTSUR"    Significant Imaging: I have reviewed all pertinent imaging results/findings within the past 24 hours.  "

## 2025-04-29 NOTE — ASSESSMENT & PLAN NOTE
Patient has long standing persistent (>12 months) atrial fibrillation.   -on home Coumadin, we will touch base with PCP to verify medication but hold for now Pulse  Min: 52  Max: 96     Antiarrhythmics   Holding blood pressure medications for now in the setting of acute GI bleed    Anticoagulants  apixaban tablet, 2 times daily, Oral    Plan  - Replete lytes with a goal of K>4, Mg >2  - Patient is not anticoagulated due to GI bleed  - Patient's afib is currently controlled  - currently holding Coumadin and would consider switch to DOAC given lower bleeding risk with apixaban in patients with cirrhosis  -telemetry monitor  Switch to Eliquis at discharge

## 2025-04-29 NOTE — ASSESSMENT & PLAN NOTE
Patient's FSGs are uncontrolled due to hyperglycemia on current medication regimen.  Last A1c reviewed-   Lab Results   Component Value Date    HGBA1C 6.3 (H) 04/27/2025     Most recent fingerstick glucose reviewed-   Recent Labs   Lab 04/28/25  1203 04/28/25  1625 04/29/25  0045 04/29/25  0610   POCTGLUCOSE 117* 78 123* 139*     Current correctional scale  Low  Maintain anti-hyperglycemic dose as follows-   Antihyperglycemics (From admission, onward)      Start     Stop Route Frequency Ordered    04/27/25 2144  insulin aspart U-100 pen 0-5 Units         -- SubQ Before meals & nightly PRN 04/27/25 2044          Hold Oral hypoglycemics while patient is in the hospital.    -Accu-Cheks AC and HS  -NPO after midnight, thank you 6 Accu-Checks

## 2025-04-30 ENCOUNTER — TELEPHONE (OUTPATIENT)
Dept: HEMATOLOGY/ONCOLOGY | Facility: CLINIC | Age: 75
End: 2025-04-30
Payer: COMMERCIAL

## 2025-05-19 ENCOUNTER — TELEPHONE (OUTPATIENT)
Dept: HEMATOLOGY/ONCOLOGY | Facility: CLINIC | Age: 75
End: 2025-05-19
Payer: COMMERCIAL

## 2025-05-29 ENCOUNTER — HOSPITAL ENCOUNTER (INPATIENT)
Facility: HOSPITAL | Age: 75
LOS: 1 days | Discharge: HOME | DRG: 812 | End: 2025-05-31
Attending: FAMILY MEDICINE | Admitting: FAMILY MEDICINE
Payer: COMMERCIAL

## 2025-05-29 DIAGNOSIS — D62 ABLA (ACUTE BLOOD LOSS ANEMIA): Primary | ICD-10-CM

## 2025-05-29 DIAGNOSIS — D50.0 ANEMIA DUE TO CHRONIC BLOOD LOSS: ICD-10-CM

## 2025-05-29 DIAGNOSIS — R07.9 CHEST PAIN: ICD-10-CM

## 2025-05-29 DIAGNOSIS — R19.5 OCCULT BLOOD POSITIVE STOOL: ICD-10-CM

## 2025-05-29 PROCEDURE — G0378 HOSPITAL OBSERVATION PER HR: HCPCS

## 2025-05-29 PROCEDURE — G0379 DIRECT REFER HOSPITAL OBSERV: HCPCS

## 2025-05-29 RX ORDER — LISINOPRIL 40 MG/1
1 TABLET ORAL DAILY
Status: ON HOLD | COMMUNITY
End: 2025-05-31 | Stop reason: HOSPADM

## 2025-05-29 RX ORDER — ACETAMINOPHEN 500 MG
1000 TABLET ORAL EVERY 8 HOURS PRN
COMMUNITY

## 2025-05-29 RX ORDER — LOSARTAN POTASSIUM 100 MG/1
100 TABLET ORAL DAILY
Status: ON HOLD | COMMUNITY
End: 2025-05-31 | Stop reason: HOSPADM

## 2025-05-29 RX ORDER — POLYETHYLENE GLYCOL 3350 17 G/17G
17 POWDER, FOR SOLUTION ORAL DAILY PRN
COMMUNITY

## 2025-05-29 RX ORDER — PIOGLITAZONE 30 MG/1
30 TABLET ORAL
Status: ON HOLD | COMMUNITY
Start: 2023-11-27 | End: 2025-05-31 | Stop reason: HOSPADM

## 2025-05-29 RX ORDER — GLIPIZIDE 10 MG/1
10 TABLET, FILM COATED, EXTENDED RELEASE ORAL
COMMUNITY
Start: 2025-05-18

## 2025-05-29 RX ORDER — TAMSULOSIN HYDROCHLORIDE 0.4 MG/1
0.4 CAPSULE ORAL DAILY
Status: ON HOLD | COMMUNITY
Start: 2025-05-26 | End: 2025-05-31 | Stop reason: HOSPADM

## 2025-05-29 RX ORDER — UREA 200 MG/G
CREAM TOPICAL 2 TIMES DAILY
Status: ON HOLD | COMMUNITY
End: 2025-05-31 | Stop reason: HOSPADM

## 2025-05-29 RX ORDER — SODIUM BICARBONATE 650 MG/1
650 TABLET ORAL 2 TIMES DAILY
Status: ON HOLD | COMMUNITY
End: 2025-05-31 | Stop reason: HOSPADM

## 2025-05-29 RX ORDER — SODIUM PHOSPHATE, DIBASIC, ANHYDROUS, POTASSIUM PHOSPHATE, MONOBASIC, AND SODIUM PHOSPHATE, MONOBASIC, MONOHYDRATE 852; 155; 130 MG/1; MG/1; MG/1
1 TABLET, COATED ORAL 2 TIMES DAILY
COMMUNITY
Start: 2025-02-25

## 2025-05-29 RX ORDER — ATORVASTATIN CALCIUM 20 MG/1
20 TABLET, FILM COATED ORAL DAILY
COMMUNITY
Start: 2025-05-01

## 2025-05-29 RX ORDER — CHLORTHALIDONE 25 MG/1
12.5 TABLET ORAL DAILY
Status: ON HOLD | COMMUNITY
End: 2025-05-31 | Stop reason: HOSPADM

## 2025-05-29 RX ORDER — FERROUS GLUCONATE 324(37.5)
324 TABLET ORAL 2 TIMES DAILY
COMMUNITY
Start: 2025-05-01

## 2025-05-29 RX ORDER — METOPROLOL SUCCINATE 50 MG/1
50 TABLET, EXTENDED RELEASE ORAL DAILY
Status: ON HOLD | COMMUNITY
End: 2025-05-31 | Stop reason: HOSPADM

## 2025-05-29 RX ORDER — FAMOTIDINE 20 MG/1
20 TABLET, FILM COATED ORAL DAILY
Status: ON HOLD | COMMUNITY
End: 2025-05-31 | Stop reason: HOSPADM

## 2025-05-29 RX ORDER — ALBUTEROL SULFATE 90 UG/1
2 INHALANT RESPIRATORY (INHALATION) EVERY 4 HOURS PRN
COMMUNITY
Start: 2025-05-26

## 2025-05-29 RX ORDER — MULTIVITAMIN
1 TABLET ORAL DAILY
COMMUNITY

## 2025-05-29 RX ORDER — ERGOCALCIFEROL 1.25 MG/1
50000 CAPSULE ORAL
Status: ON HOLD | COMMUNITY
Start: 2025-02-03 | End: 2025-05-31 | Stop reason: HOSPADM

## 2025-05-29 RX ORDER — NITROGLYCERIN 0.4 MG/1
0.4 TABLET SUBLINGUAL EVERY 5 MIN PRN
COMMUNITY

## 2025-05-29 RX ORDER — PANTOPRAZOLE SODIUM 40 MG/1
40 TABLET, DELAYED RELEASE ORAL DAILY
COMMUNITY
Start: 2025-05-01

## 2025-05-29 RX ORDER — HYDROXYZINE HYDROCHLORIDE 10 MG/1
10 TABLET, FILM COATED ORAL 3 TIMES DAILY PRN
COMMUNITY
Start: 2025-02-26

## 2025-05-29 NOTE — PROVIDER TRANSFER
Outside Transfer Acceptance Note / Regional Referral Center    Referring facility: [facility]  Referring provider: JAMEE ALLEN  Accepting facility: \[facility]\""  Accepting provider: RINA MCKINLEY  Admitting provider: DEEPIKA LEUNG  Reason for transfer: Higher Level of Care   Transfer diagnosis: Microcytic anemia  Transfer specialty requested: Gastroenterology  Transfer specialty notified: Yes  Transfer level: NUMBER 1-5: 2  Bed type requested: tele  Isolation status: No active isolations   Admission class or status: OP- Observation      Narrative     Mr. Meza is a 73yo man with a past medical history of CAD, HTN, Pafib, CVA 2015, cirrhosis, hepatocellular carcinoma Tx with resection, and PAD (Femoral artery stenting on the right January 5, 2022).    He is on Eliquis for his Afib.    As part of workup for a lung nodule the patient underwent a PET scan May 11, 2023 and was found to have a hypermetabolic mass in segment 4B the liver measuring 1.8 cm with an SUV of 7. MRI of the liver on July 3, 2023 showed a 1.8 x 1.7 cm enhancing mass of the left lobe of the liver without washout. It does not appear that an alpha fetoprotein was sent. The patient then underwent a resection of tumor on September 5, 2023. 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    He now comes to the ED at [facility] due to abnormal labs with low Hg levels on routine draw with Hg of 6.5.  Per ED, The patient denies any black or bloody stools or hematemesis. He was recently admitted to [facility] a month ago. Hemoglobin at that time was a a proximally 6.3 and he was occult positive. He was transfused a unit of blood and saw Gastroenterology who performed an EGD but did not notice any acute bleeding. There were no varices present.    Dr. Bettencourt with GI  "[place] was consulted and is planning to take for colonoscopy in the am.  He recommended holding Eliquis for now.  NPO at midnight.  He has ordered bowel prep.  He was found to have Hg 7.1g in ED with neutropenia to ANC 1215.    VS in the ED were BP (!) 138/61 -> 146/69   Pulse 54 -> 66   Temp 98 °F (36.7 °C) (Oral)   Resp 18   Ht 5' 8" (1.727 m)   Wt 76 kg (167 lb 8.8 oz)   SpO2 99%   BMI 25.48 kg/m².    Labs 5/29: WBC 1.87 (S 65% - ANC 1215), Hg 7.1, , B12 453, Cr 2.2 baseline 1.9-2.2 Cr, K 4.2, Gluc 188, no UA done, Occult blood stool positive today.    CXR not done in ED    CT A/P without contrast 5/29:  Cirrhosis and mild splenomegaly.  Postoperative changes from left partial hepatectomy for HCC. No new liver lesions, noting that evaluation is limited without contrast.  No lymphadenopathy.  Anemia, but no identifiable source.    In the ED he was treated with:  Medications   polyethylene glycol (GoLYTELY) solution (has no administration in time range)       Objective     Vitals:   Temp:  [98 °F (36.7 °C)] 98 °F (36.7 °C)  Pulse:  [54-74] 66  Resp:  [18] 18  SpO2:  [97 %-100 %] 99 %  BP: (128-146)/(61-69) 146/69     Recent Labs: All pertinent labs within the past 24 hours have been reviewed.  Recent Results (from the past 24 hours)   Basic Metabolic Panel    Collection Time: 05/29/25 10:37 AM   Result Value Ref Range    Sodium 139 136 - 145 mmol/L    Potassium 4.2 3.5 - 5.1 mmol/L    Chloride 108 95 - 110 mmol/L    CO2 24 23 - 29 mmol/L    Glucose 188 (H) 70 - 110 mg/dL    BUN 21 8 - 23 mg/dL    Creatinine 2.2 (H) 0.5 - 1.4 mg/dL    Calcium 9.4 8.7 - 10.5 mg/dL    Anion Gap 7 (L) 8 - 16 mmol/L    eGFR 30 (L) >60 mL/min/1.73/m2   CBC with Differential    Collection Time: 05/29/25 10:37 AM   Result Value Ref Range    WBC 1.87 (LL) 3.90 - 12.70 K/uL    RBC 3.33 (L) 4.60 - 6.20 M/uL    HGB 7.1 (L) 14.0 - 18.0 gm/dL    HCT 26.0 (L) 40.0 - 54.0 %    MCV 78 (L) 82 - 98 fL    MCH 21.3 (L) 27.0 - 31.0 pg    " MCHC 27.3 (L) 32.0 - 36.0 g/dL    RDW 20.8 (H) 11.5 - 14.5 %    Platelet Count 153 150 - 450 K/uL    MPV 10.3 9.2 - 12.9 fL    Nucleated RBC 0 <=0 /100 WBC   Manual Differential    Collection Time: 05/29/25 10:37 AM   Result Value Ref Range    Gran # (ANC) 1.2 K/uL    Segmented Neutrophil % 65.0 38.0 - 73.0 %    Lymphocyte % 20.0 18.0 - 48.0 %    Monocyte % 11.0 4.0 - 15.0 %    Eosinophil % 3.0 0.0 - 8.0 %    Basophil % 1.0 <=1.9 %    Platelet Estimate Appears Normal      Anisocytosis Moderate     Hypochromia Moderate     Tear drop cell Occasional     Large/Giant Platelets Present    Type & Screen    Collection Time: 05/29/25 10:37 AM   Result Value Ref Range    ABO AB     Rh Type POS     Antibody Screen NEG    Vitamin B12    Collection Time: 05/29/25 11:55 AM   Result Value Ref Range    Vitamin B12 453 210 - 950 pg/mL   Occult blood x 1, stool    Collection Time: 05/29/25  1:23 PM    Specimen: Stool   Result Value Ref Range    OCCULT BLOOD STOOL Positive (A) Negative            IV access:        Peripheral IV - Single Lumen 05/29/25 1040 20 G Distal;Left;Posterior Forearm (Active)   Site Assessment Clean;Dry;Intact 05/29/25 1040   Extremity Assessment Distal to IV No abnormal discoloration;No redness;No swelling 05/29/25 1040   Dressing Status Clean;Dry;Intact 05/29/25 1040   Dressing Intervention Integrity maintained 05/29/25 1040     Infusions: none  Allergies:   Review of patient's allergies indicates:   Allergen Reactions    Iodine and iodide containing products       NPO: No    Anticoagulation:   Anticoagulants       None             Instructions      [facility]  Admit to Hospital Medicine  Upon patient arrival to floor, please contact Hospital Medicine on call.

## 2025-05-30 ENCOUNTER — ANESTHESIA (OUTPATIENT)
Dept: ENDOSCOPY | Facility: HOSPITAL | Age: 75
DRG: 812 | End: 2025-05-30
Payer: COMMERCIAL

## 2025-05-30 ENCOUNTER — ANESTHESIA EVENT (OUTPATIENT)
Dept: ENDOSCOPY | Facility: HOSPITAL | Age: 75
DRG: 812 | End: 2025-05-30
Payer: COMMERCIAL

## 2025-05-30 PROBLEM — N17.9 AKI (ACUTE KIDNEY INJURY): Status: ACTIVE | Noted: 2025-05-30

## 2025-05-30 PROBLEM — K92.2 GIB (GASTROINTESTINAL BLEEDING): Status: ACTIVE | Noted: 2025-05-30

## 2025-05-30 PROBLEM — I27.20 PULMONARY HYPERTENSION: Status: ACTIVE | Noted: 2025-05-30

## 2025-05-30 PROBLEM — D62 ABLA (ACUTE BLOOD LOSS ANEMIA): Status: ACTIVE | Noted: 2025-05-30

## 2025-05-30 PROBLEM — D50.9 MICROCYTIC ANEMIA: Status: ACTIVE | Noted: 2025-05-30

## 2025-05-30 PROBLEM — I50.32 CHRONIC DIASTOLIC CONGESTIVE HEART FAILURE: Status: ACTIVE | Noted: 2025-05-30

## 2025-05-30 LAB
ABO + RH BLD: NORMAL
ABSOLUTE NEUTROPHIL MANUAL (OHS): 1 K/UL
ALBUMIN SERPL BCP-MCNC: 3.2 G/DL (ref 3.5–5.2)
ALP SERPL-CCNC: 44 UNIT/L (ref 40–150)
ALT SERPL W/O P-5'-P-CCNC: 7 UNIT/L (ref 10–44)
ANION GAP (OHS): 7 MMOL/L (ref 8–16)
ANISOCYTOSIS BLD QL SMEAR: SLIGHT
AST SERPL-CCNC: 10 UNIT/L (ref 11–45)
BASOPHILS NFR BLD MANUAL: 2 %
BILIRUB SERPL-MCNC: 0.7 MG/DL (ref 0.1–1)
BLD PROD TYP BPU: NORMAL
BLOOD UNIT EXPIRATION DATE: NORMAL
BLOOD UNIT TYPE CODE: 2800
BUN SERPL-MCNC: 17 MG/DL (ref 8–23)
CALCIUM SERPL-MCNC: 8.6 MG/DL (ref 8.7–10.5)
CHLORIDE SERPL-SCNC: 111 MMOL/L (ref 95–110)
CO2 SERPL-SCNC: 21 MMOL/L (ref 23–29)
CREAT SERPL-MCNC: 1.8 MG/DL (ref 0.5–1.4)
CROSSMATCH INTERPRETATION: NORMAL
DISPENSE STATUS: NORMAL
EOSINOPHIL NFR BLD MANUAL: 1 % (ref 0–8)
ERYTHROCYTE [DISTWIDTH] IN BLOOD BY AUTOMATED COUNT: 21.6 % (ref 11.5–14.5)
GFR SERPLBLD CREATININE-BSD FMLA CKD-EPI: 39 ML/MIN/1.73/M2
GLUCOSE SERPL-MCNC: 48 MG/DL (ref 70–110)
HCT VFR BLD AUTO: 22.1 % (ref 40–54)
HGB BLD-MCNC: 6.1 GM/DL (ref 14–18)
HYPOCHROMIA BLD QL SMEAR: ABNORMAL
INDIRECT COOMBS: NORMAL
LYMPHOCYTES NFR BLD MANUAL: 27 % (ref 18–48)
MCH RBC QN AUTO: 21.3 PG (ref 27–31)
MCHC RBC AUTO-ENTMCNC: 27.6 G/DL (ref 32–36)
MCV RBC AUTO: 77 FL (ref 82–98)
MONOCYTES NFR BLD MANUAL: 14 % (ref 4–15)
NEUTROPHILS NFR BLD MANUAL: 56 % (ref 38–73)
NUCLEATED RBC (/100WBC) (OHS): 0 /100 WBC
PLATELET # BLD AUTO: 139 K/UL (ref 150–450)
PLATELET BLD QL SMEAR: ABNORMAL
PMV BLD AUTO: 11.9 FL (ref 9.2–12.9)
POCT GLUCOSE: 128 MG/DL (ref 70–110)
POCT GLUCOSE: 220 MG/DL (ref 70–110)
POCT GLUCOSE: 221 MG/DL (ref 70–110)
POCT GLUCOSE: 88 MG/DL (ref 70–110)
POTASSIUM SERPL-SCNC: 4.1 MMOL/L (ref 3.5–5.1)
PROT SERPL-MCNC: 6.7 GM/DL (ref 6–8.4)
RBC # BLD AUTO: 2.86 M/UL (ref 4.6–6.2)
RH BLD: NORMAL
SODIUM SERPL-SCNC: 139 MMOL/L (ref 136–145)
SPECIMEN OUTDATE: NORMAL
UNIT NUMBER: NORMAL
WBC # BLD AUTO: 1.87 K/UL (ref 3.9–12.7)

## 2025-05-30 PROCEDURE — 0DBL8ZX EXCISION OF TRANSVERSE COLON, VIA NATURAL OR ARTIFICIAL OPENING ENDOSCOPIC, DIAGNOSTIC: ICD-10-PCS | Performed by: EMERGENCY MEDICINE

## 2025-05-30 PROCEDURE — 96375 TX/PRO/DX INJ NEW DRUG ADDON: CPT

## 2025-05-30 PROCEDURE — 0DBE8ZX EXCISION OF LARGE INTESTINE, VIA NATURAL OR ARTIFICIAL OPENING ENDOSCOPIC, DIAGNOSTIC: ICD-10-PCS | Performed by: EMERGENCY MEDICINE

## 2025-05-30 PROCEDURE — 25000003 PHARM REV CODE 250: Performed by: NURSE ANESTHETIST, CERTIFIED REGISTERED

## 2025-05-30 PROCEDURE — D9220A PRA ANESTHESIA: Mod: CRNA,,, | Performed by: NURSE ANESTHETIST, CERTIFIED REGISTERED

## 2025-05-30 PROCEDURE — 27201012 HC FORCEPS, HOT/COLD, DISP: Performed by: INTERNAL MEDICINE

## 2025-05-30 PROCEDURE — 85007 BL SMEAR W/DIFF WBC COUNT: CPT | Performed by: REGISTERED NURSE

## 2025-05-30 PROCEDURE — P9016 RBC LEUKOCYTES REDUCED: HCPCS | Performed by: REGISTERED NURSE

## 2025-05-30 PROCEDURE — 45381 COLONOSCOPY SUBMUCOUS NJX: CPT | Performed by: INTERNAL MEDICINE

## 2025-05-30 PROCEDURE — 63600175 PHARM REV CODE 636 W HCPCS: Performed by: REGISTERED NURSE

## 2025-05-30 PROCEDURE — 36415 COLL VENOUS BLD VENIPUNCTURE: CPT | Performed by: REGISTERED NURSE

## 2025-05-30 PROCEDURE — 96374 THER/PROPH/DIAG INJ IV PUSH: CPT

## 2025-05-30 PROCEDURE — 36430 TRANSFUSION BLD/BLD COMPNT: CPT

## 2025-05-30 PROCEDURE — 86850 RBC ANTIBODY SCREEN: CPT | Performed by: REGISTERED NURSE

## 2025-05-30 PROCEDURE — 45380 COLONOSCOPY AND BIOPSY: CPT | Mod: ,,, | Performed by: INTERNAL MEDICINE

## 2025-05-30 PROCEDURE — 99223 1ST HOSP IP/OBS HIGH 75: CPT | Mod: 25,,, | Performed by: INTERNAL MEDICINE

## 2025-05-30 PROCEDURE — 88305 TISSUE EXAM BY PATHOLOGIST: CPT | Mod: 26,,, | Performed by: PATHOLOGY

## 2025-05-30 PROCEDURE — 45380 COLONOSCOPY AND BIOPSY: CPT | Performed by: INTERNAL MEDICINE

## 2025-05-30 PROCEDURE — 88305 TISSUE EXAM BY PATHOLOGIST: CPT | Mod: TC | Performed by: INTERNAL MEDICINE

## 2025-05-30 PROCEDURE — 25000003 PHARM REV CODE 250: Performed by: REGISTERED NURSE

## 2025-05-30 PROCEDURE — 11000001 HC ACUTE MED/SURG PRIVATE ROOM

## 2025-05-30 PROCEDURE — D9220A PRA ANESTHESIA: Mod: ANES,,, | Performed by: ANESTHESIOLOGY

## 2025-05-30 PROCEDURE — 45381 COLONOSCOPY SUBMUCOUS NJX: CPT | Mod: 51,,, | Performed by: INTERNAL MEDICINE

## 2025-05-30 PROCEDURE — 37000008 HC ANESTHESIA 1ST 15 MINUTES: Performed by: INTERNAL MEDICINE

## 2025-05-30 PROCEDURE — 37000009 HC ANESTHESIA EA ADD 15 MINS: Performed by: INTERNAL MEDICINE

## 2025-05-30 PROCEDURE — 63600175 PHARM REV CODE 636 W HCPCS: Performed by: NURSE ANESTHETIST, CERTIFIED REGISTERED

## 2025-05-30 PROCEDURE — 86920 COMPATIBILITY TEST SPIN: CPT | Performed by: REGISTERED NURSE

## 2025-05-30 PROCEDURE — 80053 COMPREHEN METABOLIC PANEL: CPT | Performed by: REGISTERED NURSE

## 2025-05-30 RX ORDER — PROPOFOL 10 MG/ML
VIAL (ML) INTRAVENOUS
Status: DISCONTINUED | OUTPATIENT
Start: 2025-05-30 | End: 2025-05-30

## 2025-05-30 RX ORDER — GLUCAGON 1 MG
1 KIT INJECTION
Status: DISCONTINUED | OUTPATIENT
Start: 2025-05-30 | End: 2025-05-31 | Stop reason: HOSPADM

## 2025-05-30 RX ORDER — INSULIN ASPART 100 [IU]/ML
0-5 INJECTION, SOLUTION INTRAVENOUS; SUBCUTANEOUS
Status: DISCONTINUED | OUTPATIENT
Start: 2025-05-30 | End: 2025-05-31 | Stop reason: HOSPADM

## 2025-05-30 RX ORDER — LIDOCAINE HYDROCHLORIDE 20 MG/ML
INJECTION INTRAVENOUS
Status: DISCONTINUED | OUTPATIENT
Start: 2025-05-30 | End: 2025-05-30

## 2025-05-30 RX ORDER — IBUPROFEN 200 MG
24 TABLET ORAL
Status: DISCONTINUED | OUTPATIENT
Start: 2025-05-30 | End: 2025-05-31 | Stop reason: HOSPADM

## 2025-05-30 RX ORDER — NALOXONE HCL 0.4 MG/ML
0.02 VIAL (ML) INJECTION
Status: DISCONTINUED | OUTPATIENT
Start: 2025-05-30 | End: 2025-05-31 | Stop reason: HOSPADM

## 2025-05-30 RX ORDER — PANTOPRAZOLE SODIUM 40 MG/10ML
40 INJECTION, POWDER, LYOPHILIZED, FOR SOLUTION INTRAVENOUS 2 TIMES DAILY
Status: DISCONTINUED | OUTPATIENT
Start: 2025-05-30 | End: 2025-05-31 | Stop reason: HOSPADM

## 2025-05-30 RX ORDER — IBUPROFEN 200 MG
16 TABLET ORAL
Status: DISCONTINUED | OUTPATIENT
Start: 2025-05-30 | End: 2025-05-31 | Stop reason: HOSPADM

## 2025-05-30 RX ORDER — HYDROCODONE BITARTRATE AND ACETAMINOPHEN 500; 5 MG/1; MG/1
TABLET ORAL
Status: DISCONTINUED | OUTPATIENT
Start: 2025-05-30 | End: 2025-05-31 | Stop reason: HOSPADM

## 2025-05-30 RX ORDER — PROPOFOL 10 MG/ML
VIAL (ML) INTRAVENOUS CONTINUOUS PRN
Status: DISCONTINUED | OUTPATIENT
Start: 2025-05-30 | End: 2025-05-30

## 2025-05-30 RX ORDER — SODIUM CHLORIDE 0.9 % (FLUSH) 0.9 %
10 SYRINGE (ML) INJECTION EVERY 12 HOURS PRN
Status: DISCONTINUED | OUTPATIENT
Start: 2025-05-30 | End: 2025-05-31 | Stop reason: HOSPADM

## 2025-05-30 RX ORDER — HYDROCODONE BITARTRATE AND ACETAMINOPHEN 500; 5 MG/1; MG/1
TABLET ORAL
Status: DISCONTINUED | OUTPATIENT
Start: 2025-05-30 | End: 2025-05-30

## 2025-05-30 RX ORDER — ONDANSETRON HYDROCHLORIDE 2 MG/ML
4 INJECTION, SOLUTION INTRAVENOUS EVERY 8 HOURS PRN
Status: DISCONTINUED | OUTPATIENT
Start: 2025-05-30 | End: 2025-05-31 | Stop reason: HOSPADM

## 2025-05-30 RX ADMIN — PROPOFOL 150 MCG/KG/MIN: 10 INJECTION, EMULSION INTRAVENOUS at 04:05

## 2025-05-30 RX ADMIN — PANTOPRAZOLE SODIUM 40 MG: 40 INJECTION, POWDER, FOR SOLUTION INTRAVENOUS at 08:05

## 2025-05-30 RX ADMIN — DEXTROSE MONOHYDRATE 25 G: 25 INJECTION, SOLUTION INTRAVENOUS at 08:05

## 2025-05-30 RX ADMIN — LIDOCAINE HYDROCHLORIDE 100 MG: 20 INJECTION, SOLUTION INTRAVENOUS at 04:05

## 2025-05-30 RX ADMIN — SODIUM CHLORIDE: 0.9 INJECTION, SOLUTION INTRAVENOUS at 04:05

## 2025-05-30 RX ADMIN — PROPOFOL 70 MG: 10 INJECTION, EMULSION INTRAVENOUS at 04:05

## 2025-05-30 NOTE — PROVATION PATIENT INSTRUCTIONS
Discharge Summary/Instructions after an Endoscopic Procedure  Patient Name: Oneil Meza  Patient MRN: [id number]  Patient YOB: 1950  Friday, May 30, 2025  Korey Bettencourt MD  Dear patient,  As a result of recent federal legislation (The Federal Cures Act), you may   receive lab or pathology results from your procedure in your [facility]   account before your physician is able to contact you. Your physician or   their representative will relay the results to you with their   recommendations at their soonest availability.  Thank you,  Your health is very important to us during the Covid Crisis. Following your   procedure today, you will receive a daily text for 2 weeks asking about   signs or symptoms of Covid 19.  Please respond to this text when you   receive it so we can follow up and keep you as safe as possible.   RESTRICTIONS:  During your procedure today, you received medications for sedation.  These   medications may affect your judgment, balance and coordination.  Therefore,   for 24 hours, you have the following restrictions:   - DO NOT drive a car, operate machinery, make legal/financial decisions,   sign important papers or drink alcohol.    ACTIVITY:  Today: no heavy lifting, straining or running due to procedural   sedation/anesthesia.  The following day: return to full activity including work.  DIET:  Eat and drink normally unless instructed otherwise.     TREATMENT FOR COMMON SIDE EFFECTS:  - Mild abdominal pain, nausea, belching, bloating or excessive gas:  rest,   eat lightly and use a heating pad.  - Sore Throat: treat with throat lozenges and/or gargle with warm salt   water.  - Because air was used during the procedure, expelling large amounts of air   from your rectum or belching is normal.  - If a bowel prep was taken, you may not have a bowel movement for 1-3 days.    This is normal.  SYMPTOMS TO WATCH FOR AND REPORT TO YOUR PHYSICIAN:  1. Abdominal pain or bloating, other than gas  cramps.  2. Chest pain.  3. Back pain.  4. Signs of infection such as: chills or fever occurring within 24 hours   after the procedure.  5. Rectal bleeding, which would show as bright red, maroon, or black stools.   (A tablespoon of blood from the rectum is not serious, especially if   hemorrhoids are present.)  6. Vomiting.  7. Weakness or dizziness.  GO DIRECTLY TO THE NEAREST EMERGENCY ROOM IF YOU HAVE ANY OF THE FOLLOWING:      Difficulty breathing              Chills and/or fever over 101 F   Persistent vomiting and/or vomiting blood   Severe abdominal pain   Severe chest pain   Black, tarry stools   Bleeding- more than one tablespoon   Any other symptom or condition that you feel may need urgent attention  Your doctor recommends these additional instructions:  If any biopsies were taken, your doctors clinic will contact you in 1 to 2   weeks with any results.  - Return patient to hospital dai for possible discharge same day.   - Advance diet as tolerated.   - Continue present medications.   - Await pathology results.   - Repeat colonoscopy in 1 year for surveillance.   - Refer to colorectal surgeon. Obtain staging CT with IV contrast, chest abd   pelvis. Refer to oncologist.  For questions, problems or results please call your physician - Korey Bettencourt MD.  EMERGENCY PHONE NUMBER: [phone],  LAB RESULTS: [phone]  IF A COMPLICATION OR EMERGENCY SITUATION ARISES AND YOU ARE UNABLE TO REACH   YOUR PHYSICIAN - GO DIRECTLY TO THE EMERGENCY ROOM.  Korey Bettencourt MD  5/30/2025 4:27:43 PM  This report has been verified and signed electronically.  Dear patient,  As a result of recent federal legislation (The Federal Cures Act), you may   receive lab or pathology results from your procedure in your [facility]   account before your physician is able to contact you. Your physician or   their representative will relay the results to you with their   recommendations at their soonest availability.  Thank  you,  PROVATION

## 2025-05-30 NOTE — ASSESSMENT & PLAN NOTE
Occult stool positive but no overt bleeding    Recs  Hold anticoag acutely  Npo  Colonoscopy today    Further recs pending procedure.

## 2025-05-30 NOTE — PLAN OF CARE
05/30/25 1159   Discharge Planning   Assessment Type Discharge Planning Brief Assessment   Resource/Environmental Concerns none   Support Systems Family members   Equipment Currently Used at Home none   Current Living Arrangements home   Patient/Family Anticipates Transition to home with family   Patient/Family Anticipated Services at Transition none   DME Needed Upon Discharge  none   Discharge Plan A Home with family     Pt lives with his brother and IADL's prior to admission. Pt is scheduled for Colonoscopy today. Pt will call family to pick him up upon discharge.    Aaliyah Tristan RN, [facility]  Case Management- [place]  [phone]

## 2025-05-30 NOTE — PROGRESS NOTES
Inpatient Upgrade Note    Oneil Meza has warranted treatment spanning two or more midnights of hospital level care for the management of anemia. He continues to require daily labs, monitoring of vital signs, advancing diet, medication adjustments, and further evaluation by consultants. His condition is also complicated by the following comorbidities: CAD, HTN, Pafib, CVA 2015, cirrhosis, hepatocellular carcinoma Tx with resection, and PAD.

## 2025-05-30 NOTE — CONSULTS
"[facility] ([facility])  Gastroenterology  Consult Note    Patient Name: Oneil Meza  MRN: [id number]  Admission Date: 2025  Hospital Length of Stay: 0 days  Code Status: Full Code   Attending Provider: Stephanie Joe MD   Consulting Provider: Korey Bettencourt MD  Primary Care Physician: Korey Bower MD  Principal Problem:GIB (gastrointestinal bleeding)    Gastroenterology  Consult performed by: Korey Bettencourt MD  Consult ordered by: Alex Astorga NP        Subjective:     HPI:  This is 73 y/o male hx afib, cva, cirhrosis, HCC here for anemia.  Transferred for gi eval ongoing anemia.    Pt denies any bllood in stool, occult was positive, no radiating symptoms, no alleviating facotrs. No vomiting. No abd pain.  Tolerated the bowel prep  Never had colonoscopy    Reivewed egd which was negative and done last janelle    Past Medical History:   Diagnosis Date    Anticoagulant long-term use     Coronary artery disease     Hypertension        Past Surgical History:   Procedure Laterality Date    ESOPHAGOGASTRODUODENOSCOPY N/A 2025    Procedure: EGD (ESOPHAGOGASTRODUODENOSCOPY);  Surgeon: Korey Bettencourt MD;  Location: [facility];  Service: Endoscopy;  Laterality: N/A;    VASCULAR SURGERY      stent to left leg artery-per pt report       Review of patient's allergies indicates:   Allergen Reactions    Iodine and iodide containing products      Family History    None       Tobacco Use    Smoking status: Former     Current packs/day: 0.00     Average packs/day: 1 pack/day for 52.0 years (52.0 ttl pk-yrs)     Types: Cigarettes     Start date:      Quit date: 2016     Years since quittin.4    Smokeless tobacco: Never    Tobacco comments:     Pt is a former 1 pk/day cigarette smoker ("on and off") x 52 yrs. He states that he quit smoking in 2016 and has not relapsed. EMR updated to reflect "Former Smoker" status.   Substance and Sexual Activity    Alcohol use: No    Drug use: Yes     " Comment: heroin-snort    Sexual activity: Not on file     Review of Systems   Constitutional:  Negative for chills and fever.   HENT:  Negative for facial swelling, mouth sores and trouble swallowing.    Eyes:  Negative for pain and redness.   Respiratory:  Negative for cough and shortness of breath.    Cardiovascular:  Negative for chest pain and leg swelling.   Gastrointestinal:  Negative for nausea and vomiting.   Genitourinary:  Negative for dysuria and hematuria.   Musculoskeletal:  Negative for gait problem and neck stiffness.   Skin:  Negative for rash and wound.   Neurological:  Negative for seizures and headaches.   Psychiatric/Behavioral:  Negative for confusion and hallucinations.      Objective:     Vital Signs (Most Recent):  Temp: 97.7 °F (36.5 °C) (05/30/25 1630)  Pulse: (!) 54 (05/30/25 1645)  Resp: (!) 22 (05/30/25 1645)  BP: (!) 115/58 (05/30/25 1645)  SpO2: 99 % (05/30/25 1645) Vital Signs (24h Range):  Temp:  [97.7 °F (36.5 °C)-98.1 °F (36.7 °C)] 97.7 °F (36.5 °C)  Pulse:  [48-72] 54  Resp:  [18-22] 22  SpO2:  [95 %-100 %] 99 %  BP: (114-168)/(58-78) 115/58     Weight: 74.6 kg (164 lb 7.4 oz) (05/30/25 0009)  Body mass index is 25.01 kg/m².      Intake/Output Summary (Last 24 hours) at 5/30/2025 1704  Last data filed at 5/30/2025 1445  Gross per 24 hour   Intake 1293 ml   Output --   Net 1293 ml       Lines/Drains/Airways       Peripheral Intravenous Line  Duration                  Peripheral IV - Single Lumen 05/29/25 1040 20 G Distal;Left;Posterior Forearm 1 day                     Physical Exam  Vitals reviewed.   Constitutional:       General: He is not in acute distress.     Appearance: He is well-developed.   HENT:      Head: Normocephalic and atraumatic.   Eyes:      General: No scleral icterus.     Conjunctiva/sclera: Conjunctivae normal.   Neck:      Thyroid: No thyromegaly.   Cardiovascular:      Rate and Rhythm: Normal rate and regular rhythm.   Pulmonary:      Effort: Pulmonary effort  is normal. No respiratory distress.      Breath sounds: Normal breath sounds.   Abdominal:      General: There is no distension.      Palpations: Abdomen is soft.      Tenderness: There is no abdominal tenderness.   Musculoskeletal:         General: No tenderness. Normal range of motion.      Cervical back: Neck supple.   Lymphadenopathy:      Cervical: No cervical adenopathy.   Skin:     General: Skin is warm and dry.      Findings: No rash.   Neurological:      Mental Status: He is alert and oriented to person, place, and time.      Gait: Gait normal.   Psychiatric:         Mood and Affect: Mood normal.         Behavior: Behavior normal.          Significant Labs:  CBC:   Recent Labs   Lab 05/29/25  1037 05/30/25 0431   WBC 1.87* 1.87*   HGB 7.1* 6.1*   HCT 26.0* 22.1*    139*     BMP:   Recent Labs   Lab 05/30/25 0431   GLU 48*      K 4.1   *   CO2 21*   BUN 17   CREATININE 1.8*   CALCIUM 8.6*     CMP:   Recent Labs   Lab 05/30/25 0431   GLU 48*   CALCIUM 8.6*   ALBUMIN 3.2*   PROT 6.7      K 4.1   CO2 21*   *   BUN 17   CREATININE 1.8*   ALKPHOS 44   ALT 7*   AST 10*   BILITOT 0.7       Significant Imaging:  Imaging results within the past 24 hours have been reviewed.  Assessment/Plan:     Oncology  ABLA (acute blood loss anemia)  Occult stool positive but no overt bleeding    Recs  Hold anticoag acutely  Npo  Colonoscopy today    Further recs pending procedure.        Thank you for your consult. I will follow-up with patient. Please contact us if you have any additional questions.    Korey Bettencourt MD  Gastroenterology  [place] [facility])

## 2025-05-30 NOTE — HPI
"  Mr. Meza is a 73yo man with a past medical history of CAD, HTN, Pafib, CVA 2015, cirrhosis, hepatocellular carcinoma Tx with resection, and PAD (Femoral artery stenting on the right January 5, 2022).    He is on Eliquis for his Afib.     As part of workup for a lung nodule the patient underwent a PET scan May 11, 2023 and was found to have a hypermetabolic mass in segment 4B the liver measuring 1.8 cm with an SUV of 7. MRI of the liver on July 3, 2023 showed a 1.8 x 1.7 cm enhancing mass of the left lobe of the liver without washout. It does not appear that an alpha fetoprotein was sent. The patient then underwent a resection of tumor on September 5, 2023. 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     He now comes to the ED at [facility] due to abnormal labs with low Hg levels on routine draw with Hg of 6.5.  Per ED, The patient denies any black or bloody stools or hematemesis. He was recently admitted to [facility] a month ago. Hemoglobin at that time was a a proximally 6.3 and he was occult positive. He was transfused a unit of blood and saw Gastroenterology who performed an EGD but did not notice any acute bleeding. There were no varices present.     Dr. Bettencourt with  [facility] was consulted and is planning to take for colonoscopy in the am.  He recommended holding Eliquis for now.  NPO at midnight.  He has ordered bowel prep.  He was found to have Hg 7.1g in ED with neutropenia to ANC 1215.     VS in the ED were BP (!) 138/61 -> 146/69   Pulse 54 -> 66   Temp 98 °F (36.7 °C) (Oral)   Resp 18   Ht 5' 8" (1.727 m)   Wt 76 kg (167 lb 8.8 oz)   SpO2 99%   BMI 25.48 kg/m².     Labs 5/29: WBC 1.87 (S 65% - ANC 1215), Hg 7.1, , B12 453, Cr 2.2 baseline 1.9-2.2 Cr, K 4.2, Gluc 188, no UA done, Occult blood stool positive today.     CXR not " done in ED     CT A/P without contrast 5/29:  Cirrhosis and mild splenomegaly.  Postoperative changes from left partial hepatectomy for HCC. No new liver lesions, noting that evaluation is limited without contrast.  No lymphadenopathy.  Anemia, but no identifiable source.

## 2025-05-30 NOTE — ASSESSMENT & PLAN NOTE
Patient's hemorrhage is due to gastrointestinal bleed, this bleeding is associated with an anticoagulant, the anticoagulant is Select Anticoagulant(s): Direct oral anticoagulant: Apixaban (Eliquis). Patients most recent Hgb, Hct, platelets, and INR are listed below.  Recent Labs     05/29/25  1037   HGB 7.1*   HCT 26.0*        Plan  - Will trend hemoglobin/hematocrit Daily  - Will monitor and correct any coagulation defects  - Will transfuse if Hgb is <7g/dl (<8g/dl in cases of active ACS) or if patient has rapid bleeding leading to hemodynamic instability  - hold Eliquis  -start PPI b.i.d.  -consult GI  -GoLYTELY prep started

## 2025-05-30 NOTE — SUBJECTIVE & OBJECTIVE
"Past Medical History:   Diagnosis Date    Anticoagulant long-term use     Coronary artery disease     Hypertension        Past Surgical History:   Procedure Laterality Date    ESOPHAGOGASTRODUODENOSCOPY N/A 2025    Procedure: EGD (ESOPHAGOGASTRODUODENOSCOPY);  Surgeon: Korey Bettencourt MD;  Location: [facility];  Service: Endoscopy;  Laterality: N/A;    VASCULAR SURGERY      stent to left leg artery-per pt report       Review of patient's allergies indicates:   Allergen Reactions    Iodine and iodide containing products      Family History    None       Tobacco Use    Smoking status: Former     Current packs/day: 0.00     Average packs/day: 1 pack/day for 52.0 years (52.0 ttl pk-yrs)     Types: Cigarettes     Start date:      Quit date: 2016     Years since quittin.4    Smokeless tobacco: Never    Tobacco comments:     Pt is a former 1 pk/day cigarette smoker ("on and off") x 52 yrs. He states that he quit smoking in 2016 and has not relapsed. EMR updated to reflect "Former Smoker" status.   Substance and Sexual Activity    Alcohol use: No    Drug use: Yes     Comment: heroin-snort    Sexual activity: Not on file     Review of Systems   Constitutional:  Negative for chills and fever.   HENT:  Negative for facial swelling, mouth sores and trouble swallowing.    Eyes:  Negative for pain and redness.   Respiratory:  Negative for cough and shortness of breath.    Cardiovascular:  Negative for chest pain and leg swelling.   Gastrointestinal:  Negative for nausea and vomiting.   Genitourinary:  Negative for dysuria and hematuria.   Musculoskeletal:  Negative for gait problem and neck stiffness.   Skin:  Negative for rash and wound.   Neurological:  Negative for seizures and headaches.   Psychiatric/Behavioral:  Negative for confusion and hallucinations.      Objective:     Vital Signs (Most Recent):  Temp: 97.7 °F (36.5 °C) (25 1630)  Pulse: (!) 54 (25 1645)  Resp: (!) 22 (25 1645)  BP: (!) " 115/58 (05/30/25 1645)  SpO2: 99 % (05/30/25 1645) Vital Signs (24h Range):  Temp:  [97.7 °F (36.5 °C)-98.1 °F (36.7 °C)] 97.7 °F (36.5 °C)  Pulse:  [48-72] 54  Resp:  [18-22] 22  SpO2:  [95 %-100 %] 99 %  BP: (114-168)/(58-78) 115/58     Weight: 74.6 kg (164 lb 7.4 oz) (05/30/25 0009)  Body mass index is 25.01 kg/m².      Intake/Output Summary (Last 24 hours) at 5/30/2025 1704  Last data filed at 5/30/2025 1445  Gross per 24 hour   Intake 1293 ml   Output --   Net 1293 ml       Lines/Drains/Airways       Peripheral Intravenous Line  Duration                  Peripheral IV - Single Lumen 05/29/25 1040 20 G Distal;Left;Posterior Forearm 1 day                     Physical Exam  Vitals reviewed.   Constitutional:       General: He is not in acute distress.     Appearance: He is well-developed.   HENT:      Head: Normocephalic and atraumatic.   Eyes:      General: No scleral icterus.     Conjunctiva/sclera: Conjunctivae normal.   Neck:      Thyroid: No thyromegaly.   Cardiovascular:      Rate and Rhythm: Normal rate and regular rhythm.   Pulmonary:      Effort: Pulmonary effort is normal. No respiratory distress.      Breath sounds: Normal breath sounds.   Abdominal:      General: There is no distension.      Palpations: Abdomen is soft.      Tenderness: There is no abdominal tenderness.   Musculoskeletal:         General: No tenderness. Normal range of motion.      Cervical back: Neck supple.   Lymphadenopathy:      Cervical: No cervical adenopathy.   Skin:     General: Skin is warm and dry.      Findings: No rash.   Neurological:      Mental Status: He is alert and oriented to person, place, and time.      Gait: Gait normal.   Psychiatric:         Mood and Affect: Mood normal.         Behavior: Behavior normal.          Significant Labs:  CBC:   Recent Labs   Lab 05/29/25  1037 05/30/25  0431   WBC 1.87* 1.87*   HGB 7.1* 6.1*   HCT 26.0* 22.1*    139*     BMP:   Recent Labs   Lab 05/30/25  0431   GLU 48*   NA  139   K 4.1   *   CO2 21*   BUN 17   CREATININE 1.8*   CALCIUM 8.6*     CMP:   Recent Labs   Lab 05/30/25  0431   GLU 48*   CALCIUM 8.6*   ALBUMIN 3.2*   PROT 6.7      K 4.1   CO2 21*   *   BUN 17   CREATININE 1.8*   ALKPHOS 44   ALT 7*   AST 10*   BILITOT 0.7       Significant Imaging:  Imaging results within the past 24 hours have been reviewed.

## 2025-05-30 NOTE — NURSING
Assumed care of patient from [facility], patient is AAOX4, room air, vitals WNL, independent to bathroom, swallows pills whole, transferred for colonoscopy in the AM, Golylty prep started, NPO at midnight, bed alarm refused, all safety precautions are in place, call bell and tray table are within reach of the patient, no additional questions or concerns from the patient at this time.

## 2025-05-30 NOTE — ASSESSMENT & PLAN NOTE
Excellent disease control    Last A1c reviewed-   Lab Results   Component Value Date    HGBA1C 6.3 (H) 04/27/2025     Continue chronic home meds

## 2025-05-30 NOTE — ASSESSMENT & PLAN NOTE
Anemia is likely due to acute blood loss which was from GIB. Most recent hemoglobin and hematocrit are listed below.  Recent Labs     05/30/25  0431 05/31/25  0506   HGB 6.1* 7.2*   HCT 22.1* 25.6*     Transfuse 1 u PRBCs today 5/30  Hgb has remained stable

## 2025-05-30 NOTE — ASSESSMENT & PLAN NOTE
Per records:  As part of workup for a lung nodule the patient underwent a PET scan May 11, 2023 and was found to have a hypermetabolic mass in segment 4B the liver measuring 1.8 cm with an SUV of 7. MRI of the liver on July 3, 2023 showed a 1.8 x 1.7 cm enhancing mass of the left lobe of the liver without washout.    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 Resected on September 5, 2023.    periodic surveillance, follow-up will be dependent on the results of the tumor board discussion (10/ 2023).    No documented follow up since. Resume OP f/u with heme/ onc upon DC

## 2025-05-30 NOTE — H&P
[facility] Medicine  History & Physical    Patient Name: Oneil Meza  MRN: [id number]  Patient Class: OP- Observation  Admission Date: 5/29/2025  Attending Physician: Stephanie Joe MD   Primary Care Provider: Korey Bower MD         Patient information was obtained from patient and ER records.     Subjective:     Principal Problem:<principal problem not specified>    Chief Complaint: No chief complaint on file.       HPI:   Mr. Meza is a 73yo man with a past medical history of CAD, HTN, Pafib, CVA 2015, cirrhosis, hepatocellular carcinoma Tx with resection, and PAD (Femoral artery stenting on the right January 5, 2022).    He is on Eliquis for his Afib.     As part of workup for a lung nodule the patient underwent a PET scan May 11, 2023 and was found to have a hypermetabolic mass in segment 4B the liver measuring 1.8 cm with an SUV of 7. MRI of the liver on July 3, 2023 showed a 1.8 x 1.7 cm enhancing mass of the left lobe of the liver without washout. It does not appear that an alpha fetoprotein was sent. The patient then underwent a resection of tumor on September 5, 2023. 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     [facility] with low Hg levels on routine draw with Hg of 6.5.  Per ED, The patient denies any black or bloody stools or hematemesis. He was recently admitted to [facility] a month ago. Hemoglobin at that time was a a proximally 6.3 and he was occult positive. He was transfused a unit of blood and saw Gastroenterology who performed an EGD but did not notice any acute bleeding. There were no varices present.     Dr. Btetencourt with [facility] was consulted and is planning to take for colonoscopy in the am.  He recommended holding Eliquis for now.  NPO at  "midnight.  He has ordered bowel prep.  He was found to have Hg 7.1g in ED with neutropenia to ANC 1215.     VS in the ED were BP (!) 138/61 -> 146/69   Pulse 54 -> 66   Temp 98 °F (36.7 °C) (Oral)   Resp 18   Ht 5' 8" (1.727 m)   Wt 76 kg (167 lb 8.8 oz)   SpO2 99%   BMI 25.48 kg/m².     Labs 5/29: WBC 1.87 (S 65% - ANC 1215), Hg 7.1, , B12 453, Cr 2.2 baseline 1.9-2.2 Cr, K 4.2, Gluc 188, no UA done, Occult blood stool positive today.     CXR not done in ED     CT A/P without contrast 5/29:  Cirrhosis and mild splenomegaly.  Postoperative changes from left partial hepatectomy for HCC. No new liver lesions, noting that evaluation is limited without contrast.  No lymphadenopathy.  Anemia, but no identifiable source.      Past Medical History:   Diagnosis Date    Anticoagulant long-term use     Coronary artery disease     Hypertension        Past Surgical History:   Procedure Laterality Date    ESOPHAGOGASTRODUODENOSCOPY N/A 4/28/2025    Procedure: EGD (ESOPHAGOGASTRODUODENOSCOPY);  Surgeon: Korey Bettencourt MD;  Location: [facility];  Service: Endoscopy;  Laterality: N/A;    VASCULAR SURGERY      stent to left leg artery-per pt report       Review of patient's allergies indicates:   Allergen Reactions    Iodine and iodide containing products        Current Facility-Administered Medications on File Prior to Encounter   Medication    [COMPLETED] polyethylene glycol (GoLYTELY) solution     Current Outpatient Medications on File Prior to Encounter   Medication Sig    albuterol (PROVENTIL/VENTOLIN HFA) 90 mcg/actuation inhaler Inhale 2 puffs into the lungs every 4 (four) hours as needed for Shortness of Breath or Wheezing.    amLODIPine (NORVASC) 10 MG tablet Take 1 tablet (10 mg total) by mouth once daily.    apixaban (ELIQUIS) 5 mg Tab Take 1 tablet (5 mg total) by mouth 2 (two) times daily.    atorvastatin (LIPITOR) 20 MG tablet Take 20 mg by mouth once daily.    ferrous gluconate 324 mg (37.5 mg iron) " Tab tablet Take 324 mg by mouth 2 (two) times daily.    glipiZIDE (GLUCOTROL) 10 MG TR24 Take 10 mg by mouth daily with breakfast.    K-PHOS-NEUTRAL 250 mg tablet Take 1 tablet by mouth 2 (two) times daily.    multivitamin (THERAGRAN) per tablet Take 1 tablet by mouth once daily.    pantoprazole (PROTONIX) 40 MG tablet Take 40 mg by mouth once daily.    pioglitazone (ACTOS) 30 MG tablet Take 30 mg by mouth every 3 (three) months.    tamsulosin (FLOMAX) 0.4 mg Cap Take 0.4 mg by mouth once daily.    VITAMIN D2 1,250 mcg (50,000 unit) capsule Take 50,000 Units by mouth every 7 days.    acetaminophen (TYLENOL) 500 MG tablet Take 1,000 mg by mouth every 8 (eight) hours as needed for Pain.    chlorthalidone (HYGROTEN) 25 MG Tab Take 12.5 mg by mouth once daily.    cyanocobalamin, vitamin B-12, 1,000 mcg/mL Kit Inject 1,000 mcg as directed every 30 days.    famotidine (PEPCID) 20 MG tablet Take 20 mg by mouth Daily.    hydrOXYzine HCL (ATARAX) 10 MG Tab Take 10 mg by mouth 3 (three) times daily as needed.    lisinopriL (PRINIVIL,ZESTRIL) 40 MG tablet Take 1 tablet by mouth once daily.    losartan (COZAAR) 100 MG tablet Take 100 mg by mouth once daily.    metoprolol succinate (TOPROL-XL) 50 MG 24 hr tablet Take 50 mg by mouth once daily.    nitroGLYCERIN (NITROSTAT) 0.4 MG SL tablet Place 0.4 mg under the tongue every 5 (five) minutes as needed for Chest pain.    polyethylene glycol (GLYCOLAX) 17 gram/dose powder Take 17 g by mouth daily as needed for Constipation.    sodium bicarbonate 650 MG tablet Take 650 mg by mouth 2 (two) times daily.    urea 20 % Crea Apply topically 2 (two) times a day.     Family History    None       Tobacco Use    Smoking status: Former     Current packs/day: 0.00     Average packs/day: 1 pack/day for 52.0 years (52.0 ttl pk-yrs)     Types: Cigarettes     Start date:      Quit date: 2016     Years since quittin.4    Smokeless tobacco: Never    Tobacco comments:     Pt is a former 1  "pk/day cigarette smoker ("on and off") x 52 yrs. He states that he quit smoking in 2016 and has not relapsed. EMR updated to reflect "Former Smoker" status.   Substance and Sexual Activity    Alcohol use: No    Drug use: Yes     Comment: heroin-snort    Sexual activity: Not on file     Review of Systems   Constitutional:  Positive for fatigue.   All other systems reviewed and are negative.    Objective:     Vital Signs (Most Recent):  Temp: 98.1 °F (36.7 °C) (05/30/25 0421)  Pulse: 70 (05/30/25 0421)  Resp: 18 (05/30/25 0421)  BP: 114/67 (05/30/25 0421)  SpO2: 99 % (05/30/25 0421) Vital Signs (24h Range):  Temp:  [97.8 °F (36.6 °C)-98.1 °F (36.7 °C)] 98.1 °F (36.7 °C)  Pulse:  [54-74] 70  Resp:  [18-19] 18  SpO2:  [97 %-100 %] 99 %  BP: (114-146)/(58-69) 114/67     Weight: 74.6 kg (164 lb 7.4 oz)  Body mass index is 25.01 kg/m².     Physical Exam  Constitutional:       Appearance: Normal appearance.   HENT:      Head: Normocephalic.      Mouth/Throat:      Mouth: Mucous membranes are dry.      Pharynx: Oropharynx is clear.   Eyes:      Pupils: Pupils are equal, round, and reactive to light.   Cardiovascular:      Rate and Rhythm: Normal rate and regular rhythm.      Pulses: Normal pulses.      Heart sounds: Normal heart sounds.   Pulmonary:      Effort: Pulmonary effort is normal.      Breath sounds: Normal breath sounds.   Abdominal:      General: Bowel sounds are normal.   Musculoskeletal:         General: Normal range of motion.      Cervical back: Normal range of motion.   Skin:     General: Skin is warm and dry.      Capillary Refill: Capillary refill takes less than 2 seconds.   Neurological:      General: No focal deficit present.      Mental Status: He is alert and oriented to person, place, and time. Mental status is at baseline.   Psychiatric:         Mood and Affect: Mood normal.         Behavior: Behavior normal.              CRANIAL NERVES     CN III, IV, VI   Pupils are equal, round, and reactive to " light.       Significant Labs: All pertinent labs within the past 24 hours have been reviewed.  Recent Lab Results         05/29/25  1323   05/29/25  1155   05/29/25  1037        ABO     AB       Anion Gap     7       Aniso     Moderate       Antibody Screen     NEG       Basophil %     1.0       BUN     21       Calcium     9.4       Chloride     108       CO2     24       Creatinine     2.2       eGFR     30  Comment: Estimated GFR calculated using the CKD-EPI creatinine (2021) equation.       Eos %     3.0       Giant Platelets     Present       Glucose     188       Gran # (ANC)     1.2       Hematocrit     26.0       Hemoglobin     7.1       Hypo     Moderate       Lymph %     20.0       MCH     21.3       MCHC     27.3       MCV     78       Mono %     11.0       MPV     10.3       nRBC     0       Occult Blood Positive           Platelet Estimate     Appears Normal       Platelet Count     153       Potassium     4.2       RBC     3.33       RDW     20.8       Rh Type     POS       Segmented Neutrophil %     65.0       Sodium     139       Teardrop Cells     Occasional       Vitamin B12   453         WBC     1.87               Significant Imaging: I have reviewed all pertinent imaging results/findings within the past 24 hours.  I have reviewed and interpreted all pertinent imaging results/findings within the past 24 hours.  Assessment/Plan:     Assessment & Plan  GIB (gastrointestinal bleeding)  Patient's hemorrhage is due to gastrointestinal bleed, this bleeding is associated with an anticoagulant, the anticoagulant is Select Anticoagulant(s): Direct oral anticoagulant: Apixaban (Eliquis). Patients most recent Hgb, Hct, platelets, and INR are listed below.  Recent Labs     05/29/25  1037   HGB 7.1*   HCT 26.0*        Plan  - Will trend hemoglobin/hematocrit Daily  - Will monitor and correct any coagulation defects  - Will transfuse if Hgb is <7g/dl (<8g/dl in cases of active ACS) or if patient has  rapid bleeding leading to hemodynamic instability  - hold Eliquis  -start PPI b.i.d.  -consult GI  -GoLYTELY prep started  Microcytic anemia  Anemia is likely due to chronic blood loss. Most recent hemoglobin and hematocrit are listed below.  Recent Labs     05/29/25  1037   HGB 7.1*   HCT 26.0*     Plan  - Monitor serial CBC: Daily  - Transfuse PRBC if patient becomes hemodynamically unstable, symptomatic or H/H drops below 7/21.  - Patient has not received any PRBC transfusions to date  - Patient's anemia is currently stable  - monitor blood count  -we will transfuse as needed  -tentative colonoscopy this a.m.  VTE Risk Mitigation (From admission, onward)           Ordered     IP VTE HIGH RISK PATIENT  Once         05/30/25 0033     Place sequential compression device  Until discontinued         05/30/25 0033     Reason for No Pharmacological VTE Prophylaxis  Once        Question:  Reasons:  Answer:  Active Bleeding    05/30/25 0033                         On 05/30/2025, patient should be placed in hospital observation services under my care in collaboration with Dr Joe.           Alex Astorga NP  Department of [facility] Medicine  [place] - Telemetry

## 2025-05-30 NOTE — SUBJECTIVE & OBJECTIVE
Past Medical History:   Diagnosis Date    Anticoagulant long-term use     Coronary artery disease     Hypertension        Past Surgical History:   Procedure Laterality Date    ESOPHAGOGASTRODUODENOSCOPY N/A 4/28/2025    Procedure: EGD (ESOPHAGOGASTRODUODENOSCOPY);  Surgeon: Korey Bettencourt MD;  Location: [facility];  Service: Endoscopy;  Laterality: N/A;    VASCULAR SURGERY      stent to left leg artery-per pt report       Review of patient's allergies indicates:   Allergen Reactions    Iodine and iodide containing products        Current Facility-Administered Medications on File Prior to Encounter   Medication    [COMPLETED] polyethylene glycol (GoLYTELY) solution     Current Outpatient Medications on File Prior to Encounter   Medication Sig    albuterol (PROVENTIL/VENTOLIN HFA) 90 mcg/actuation inhaler Inhale 2 puffs into the lungs every 4 (four) hours as needed for Shortness of Breath or Wheezing.    amLODIPine (NORVASC) 10 MG tablet Take 1 tablet (10 mg total) by mouth once daily.    apixaban (ELIQUIS) 5 mg Tab Take 1 tablet (5 mg total) by mouth 2 (two) times daily.    atorvastatin (LIPITOR) 20 MG tablet Take 20 mg by mouth once daily.    ferrous gluconate 324 mg (37.5 mg iron) Tab tablet Take 324 mg by mouth 2 (two) times daily.    glipiZIDE (GLUCOTROL) 10 MG TR24 Take 10 mg by mouth daily with breakfast.    K-PHOS-NEUTRAL 250 mg tablet Take 1 tablet by mouth 2 (two) times daily.    multivitamin (THERAGRAN) per tablet Take 1 tablet by mouth once daily.    pantoprazole (PROTONIX) 40 MG tablet Take 40 mg by mouth once daily.    pioglitazone (ACTOS) 30 MG tablet Take 30 mg by mouth every 3 (three) months.    tamsulosin (FLOMAX) 0.4 mg Cap Take 0.4 mg by mouth once daily.    VITAMIN D2 1,250 mcg (50,000 unit) capsule Take 50,000 Units by mouth every 7 days.    acetaminophen (TYLENOL) 500 MG tablet Take 1,000 mg by mouth every 8 (eight) hours as needed for Pain.    chlorthalidone (HYGROTEN) 25 MG Tab Take 12.5 mg  "by mouth once daily.    cyanocobalamin, vitamin B-12, 1,000 mcg/mL Kit Inject 1,000 mcg as directed every 30 days.    famotidine (PEPCID) 20 MG tablet Take 20 mg by mouth Daily.    hydrOXYzine HCL (ATARAX) 10 MG Tab Take 10 mg by mouth 3 (three) times daily as needed.    lisinopriL (PRINIVIL,ZESTRIL) 40 MG tablet Take 1 tablet by mouth once daily.    losartan (COZAAR) 100 MG tablet Take 100 mg by mouth once daily.    metoprolol succinate (TOPROL-XL) 50 MG 24 hr tablet Take 50 mg by mouth once daily.    nitroGLYCERIN (NITROSTAT) 0.4 MG SL tablet Place 0.4 mg under the tongue every 5 (five) minutes as needed for Chest pain.    polyethylene glycol (GLYCOLAX) 17 gram/dose powder Take 17 g by mouth daily as needed for Constipation.    sodium bicarbonate 650 MG tablet Take 650 mg by mouth 2 (two) times daily.    urea 20 % Crea Apply topically 2 (two) times a day.     Family History    None       Tobacco Use    Smoking status: Former     Current packs/day: 0.00     Average packs/day: 1 pack/day for 52.0 years (52.0 ttl pk-yrs)     Types: Cigarettes     Start date:      Quit date: 2016     Years since quittin.4    Smokeless tobacco: Never    Tobacco comments:     Pt is a former 1 pk/day cigarette smoker ("on and off") x 52 yrs. He states that he quit smoking in 2016 and has not relapsed. EMR updated to reflect "Former Smoker" status.   Substance and Sexual Activity    Alcohol use: No    Drug use: Yes     Comment: heroin-snort    Sexual activity: Not on file     Review of Systems   Constitutional:  Positive for fatigue.   All other systems reviewed and are negative.    Objective:     Vital Signs (Most Recent):  Temp: 98.1 °F (36.7 °C) (25)  Pulse: 70 (25)  Resp: 18 (25)  BP: 114/67 (25)  SpO2: 99 % (25) Vital Signs (24h Range):  Temp:  [97.8 °F (36.6 °C)-98.1 °F (36.7 °C)] 98.1 °F (36.7 °C)  Pulse:  [54-74] 70  Resp:  [18-19] 18  SpO2:  [97 %-100 %] 99 %  BP: " (114-146)/(58-69) 114/67     Weight: 74.6 kg (164 lb 7.4 oz)  Body mass index is 25.01 kg/m².     Physical Exam  Constitutional:       Appearance: Normal appearance.   HENT:      Head: Normocephalic.      Mouth/Throat:      Mouth: Mucous membranes are dry.      Pharynx: Oropharynx is clear.   Eyes:      Pupils: Pupils are equal, round, and reactive to light.   Cardiovascular:      Rate and Rhythm: Normal rate and regular rhythm.      Pulses: Normal pulses.      Heart sounds: Normal heart sounds.   Pulmonary:      Effort: Pulmonary effort is normal.      Breath sounds: Normal breath sounds.   Abdominal:      General: Bowel sounds are normal.   Musculoskeletal:         General: Normal range of motion.      Cervical back: Normal range of motion.   Skin:     General: Skin is warm and dry.      Capillary Refill: Capillary refill takes less than 2 seconds.   Neurological:      General: No focal deficit present.      Mental Status: He is alert and oriented to person, place, and time. Mental status is at baseline.   Psychiatric:         Mood and Affect: Mood normal.         Behavior: Behavior normal.              CRANIAL NERVES     CN III, IV, VI   Pupils are equal, round, and reactive to light.       Significant Labs: All pertinent labs within the past 24 hours have been reviewed.  Recent Lab Results         05/29/25  1323   05/29/25  1155   05/29/25  1037        ABO     AB       Anion Gap     7       Aniso     Moderate       Antibody Screen     NEG       Basophil %     1.0       BUN     21       Calcium     9.4       Chloride     108       CO2     24       Creatinine     2.2       eGFR     30  Comment: Estimated GFR calculated using the CKD-EPI creatinine (2021) equation.       Eos %     3.0       Giant Platelets     Present       Glucose     188       Gran # (ANC)     1.2       Hematocrit     26.0       Hemoglobin     7.1       Hypo     Moderate       Lymph %     20.0       MCH     21.3       MCHC     27.3       MCV      78       Mono %     11.0       MPV     10.3       nRBC     0       Occult Blood Positive           Platelet Estimate     Appears Normal       Platelet Count     153       Potassium     4.2       RBC     3.33       RDW     20.8       Rh Type     POS       Segmented Neutrophil %     65.0       Sodium     139       Teardrop Cells     Occasional       Vitamin B12   453         WBC     1.87               Significant Imaging: I have reviewed all pertinent imaging results/findings within the past 24 hours.  I have reviewed and interpreted all pertinent imaging results/findings within the past 24 hours.

## 2025-05-30 NOTE — ASSESSMENT & PLAN NOTE
Suspect acute on chronic blood loss from colon mass    S/p colonoscopy    Impression:            - Preparation of the colon was poor.                          - Malignant tumor at the hepatic flexure. Biopsied. Tattooed. (location suspected hepatic flexure, given looping, tattoo was placed just distally to ensure marking)                          - Multiple small polyps in the sigmoid colon, in the transverse colon and in the ascending colon. Resection not attempted.                          - The examination was otherwise normal on direct and retroflexion views.     Recommendation:                     - Return patient to hospital dai for possible discharge same day.                          - Advance diet as tolerated.                          - Continue present medications.                          - Await pathology results.                          - Repeat colonoscopy in 1 year for surveillance.                          - Refer to colorectal surgeon. Obtain staging CT with IV contrast, chest abd pelvis. Refer to oncologist.

## 2025-05-30 NOTE — HPI
This is 75 y/o male hx afib, cva, cirhrosis, HCC here for anemia.  Transferred for gi eval ongoing anemia.    Pt denies any bllood in stool, occult was positive, no radiating symptoms, no alleviating facotrs. No vomiting. No abd pain.  Tolerated the bowel prep  Never had colonoscopy    Reivewed egd which was negative and done last janelle

## 2025-05-30 NOTE — PLAN OF CARE
Report received from [facility]   npo status verified  vss  prep complete  clear   alert and oriented

## 2025-05-30 NOTE — PLAN OF CARE
Problem: Adult Inpatient Plan of Care  Goal: Plan of Care Review  Outcome: Progressing  Goal: Patient-Specific Goal (Individualized)  Outcome: Progressing  Goal: Absence of Hospital-Acquired Illness or Injury  Outcome: Progressing  Goal: Optimal Comfort and Wellbeing  Outcome: Progressing  Goal: Readiness for Transition of Care  Outcome: Progressing     Problem: Diabetes Comorbidity  Goal: Blood Glucose Level Within Targeted Range  Outcome: Progressing     Problem: Comorbidity Management  Goal: Blood Pressure in Desired Range  Outcome: Progressing     Problem: Fall Injury Risk  Goal: Absence of Fall and Fall-Related Injury  Outcome: Progressing     Problem: Anemia  Goal: Anemia Symptom Improvement  Outcome: Progressing     Problem: Fatigue  Goal: Improved Activity Tolerance  Outcome: Progressing

## 2025-05-30 NOTE — ASSESSMENT & PLAN NOTE
Anemia is likely due to chronic blood loss. Most recent hemoglobin and hematocrit are listed below.  Recent Labs     05/29/25  1037   HGB 7.1*   HCT 26.0*     Plan  - Monitor serial CBC: Daily  - Transfuse PRBC if patient becomes hemodynamically unstable, symptomatic or H/H drops below 7/21.  - Patient has not received any PRBC transfusions to date  - Patient's anemia is currently stable  - monitor blood count  -we will transfuse as needed  -tentative colonoscopy this a.m.

## 2025-05-30 NOTE — PLAN OF CARE
Reported off to B>Boab,  v/s  97.7  60  20  128/58  97%RA.  Pt. Will be transported back to room  432  via stretcher.  Pt. Denies any present discomforts, NAD noted.

## 2025-05-30 NOTE — PLAN OF CARE
Problem: Adult Inpatient Plan of Care  Goal: Plan of Care Review  Outcome: Progressing     VIRTUAL NURSE:  Cued into patient's room.  Permission received per patient to turn camera to view patient.  Introduced as VN for night shift that will be working with floor nurse and nursing assistant.  Educated patient on VN's role in patient care.  Plan of care reviewed with patient.  Education per flowsheet.   Informed patient that staff will round on them every 2 hours but to use call light for any other needs they may have; informed of fall risk and fall precautions.  Patient verbalized understanding.  Call light within reach; bed siderails up x2.  Opportunity given for questions and questions answered.  Admission assessment questions answered.  Patient denies complaints or any needs at this time.  Instructed to call for assistance.  Will cont to monitor and intervene as needed.    Labs, notes, orders, and careplan initiated.    21:26  Spoke with pharmacy [facility], at [facility] in [place], [place] to get updates on patient's home meds.     05/29/25 2059   Admission Complete   Admission Complete by VN Complete      05/29/25 2059   Patient Request   Patient Requested no complaints or needs currently   Admission   Initial VN Admission Questions Complete   Communication Issues? None   Shift   Virtual Nurse - Rounding Complete   Pain Management Interventions pain management plan reviewed with patient/caregiver   Virtual Nurse - Patient Verbalized Approval Of Camera Use;VN Rounding   Type of Frequent Check   Type Patient Rounds   Safety/Activity   Patient Rounds bed in low position;placement of personal items at bedside;bed wheels locked;call light in patient/parent reach;visualized patient;clutter free environment maintained   Safety Promotion/Fall Prevention assistive device/personal item within reach;high risk medications identified;Fall Risk reviewed with patient/family;diversional activities provided;medications  reviewed;nonskid shoes/socks when out of bed;room near unit station;supervised activity;Supervised toileting - stay within arms reach;instructed to call staff for mobility;side rails raised x 2   Safety Precautions emergency equipment at bedside   Positioning   Body Position neutral body alignment;neutral head position;position changed independently   Head of Bed (HOB) Positioning HOB at 60-90 degrees   Pain/Comfort/Sleep   Preferred Pain Scale number (Numeric Rating Pain Scale)   Comfort/Acceptable Pain Level 0   Pain Rating (0-10): Rest 0   Sleep/Rest/Relaxation no problem identified;awake

## 2025-05-31 VITALS
DIASTOLIC BLOOD PRESSURE: 59 MMHG | RESPIRATION RATE: 20 BRPM | HEIGHT: 68 IN | HEART RATE: 83 BPM | TEMPERATURE: 98 F | WEIGHT: 164.44 LBS | SYSTOLIC BLOOD PRESSURE: 115 MMHG | OXYGEN SATURATION: 98 % | BODY MASS INDEX: 24.92 KG/M2

## 2025-05-31 LAB
ABSOLUTE EOSINOPHIL (OHS): 0.02 K/UL
ABSOLUTE MONOCYTE (OHS): 0.33 K/UL (ref 0.3–1)
ABSOLUTE NEUTROPHIL COUNT (OHS): 1.28 K/UL (ref 1.8–7.7)
ALBUMIN SERPL BCP-MCNC: 3 G/DL (ref 3.5–5.2)
ALP SERPL-CCNC: 46 UNIT/L (ref 40–150)
ALT SERPL W/O P-5'-P-CCNC: 5 UNIT/L (ref 10–44)
ANION GAP (OHS): 6 MMOL/L (ref 8–16)
AST SERPL-CCNC: 15 UNIT/L (ref 11–45)
BASOPHILS # BLD AUTO: 0.01 K/UL
BASOPHILS NFR BLD AUTO: 0.5 %
BILIRUB SERPL-MCNC: 0.7 MG/DL (ref 0.1–1)
BUN SERPL-MCNC: 14 MG/DL (ref 8–23)
CALCIUM SERPL-MCNC: 8.2 MG/DL (ref 8.7–10.5)
CHLORIDE SERPL-SCNC: 114 MMOL/L (ref 95–110)
CO2 SERPL-SCNC: 17 MMOL/L (ref 23–29)
CREAT SERPL-MCNC: 1.8 MG/DL (ref 0.5–1.4)
ERYTHROCYTE [DISTWIDTH] IN BLOOD BY AUTOMATED COUNT: 21.5 % (ref 11.5–14.5)
GFR SERPLBLD CREATININE-BSD FMLA CKD-EPI: 39 ML/MIN/1.73/M2
GLUCOSE SERPL-MCNC: 144 MG/DL (ref 70–110)
HCT VFR BLD AUTO: 25.6 % (ref 40–54)
HGB BLD-MCNC: 7.2 GM/DL (ref 14–18)
IMM GRANULOCYTES # BLD AUTO: 0 K/UL (ref 0–0.04)
IMM GRANULOCYTES NFR BLD AUTO: 0 % (ref 0–0.5)
LYMPHOCYTES # BLD AUTO: 0.53 K/UL (ref 1–4.8)
MCH RBC QN AUTO: 22.4 PG (ref 27–31)
MCHC RBC AUTO-ENTMCNC: 28.1 G/DL (ref 32–36)
MCV RBC AUTO: 80 FL (ref 82–98)
NUCLEATED RBC (/100WBC) (OHS): 0 /100 WBC
PLATELET # BLD AUTO: 136 K/UL (ref 150–450)
PMV BLD AUTO: ABNORMAL FL
POCT GLUCOSE: 153 MG/DL (ref 70–110)
POCT GLUCOSE: 213 MG/DL (ref 70–110)
POTASSIUM SERPL-SCNC: 4.4 MMOL/L (ref 3.5–5.1)
PROT SERPL-MCNC: 6.5 GM/DL (ref 6–8.4)
RBC # BLD AUTO: 3.22 M/UL (ref 4.6–6.2)
RELATIVE EOSINOPHIL (OHS): 0.9 %
RELATIVE LYMPHOCYTE (OHS): 24.4 % (ref 18–48)
RELATIVE MONOCYTE (OHS): 15.2 % (ref 4–15)
RELATIVE NEUTROPHIL (OHS): 59 % (ref 38–73)
SODIUM SERPL-SCNC: 137 MMOL/L (ref 136–145)
WBC # BLD AUTO: 2.17 K/UL (ref 3.9–12.7)

## 2025-05-31 PROCEDURE — 63600175 PHARM REV CODE 636 W HCPCS: Performed by: REGISTERED NURSE

## 2025-05-31 PROCEDURE — 85025 COMPLETE CBC W/AUTO DIFF WBC: CPT | Performed by: REGISTERED NURSE

## 2025-05-31 PROCEDURE — 80053 COMPREHEN METABOLIC PANEL: CPT | Performed by: REGISTERED NURSE

## 2025-05-31 PROCEDURE — 36415 COLL VENOUS BLD VENIPUNCTURE: CPT | Performed by: REGISTERED NURSE

## 2025-05-31 RX ADMIN — INSULIN ASPART 2 UNITS: 100 INJECTION, SOLUTION INTRAVENOUS; SUBCUTANEOUS at 12:05

## 2025-05-31 NOTE — PLAN OF CARE
Problem: Adult Inpatient Plan of Care  Goal: Plan of Care Review  Outcome: Progressing     Problem: Diabetes Comorbidity  Goal: Blood Glucose Level Within Targeted Range  Outcome: Progressing     Problem: Gastrointestinal Bleeding  Goal: Optimal Coping with Acute Illness  5/30/2025 2128 by Bunny Martinez RN  Outcome: Progressing  5/30/2025 2128 by Bunny Martinez RN  Outcome: Progressing     Problem: Gastrointestinal Bleeding  Goal: Hemostasis  5/30/2025 2128 by Bunny Martinez RN  Outcome: Progressing  5/30/2025 2128 by Bunny Martinez RN  Outcome: Progressing     Problem: Fall Injury Risk  Goal: Absence of Fall and Fall-Related Injury  Outcome: Progressing     Problem: Acute Kidney Injury/Impairment  Goal: Fluid and Electrolyte Balance  Outcome: Progressing

## 2025-05-31 NOTE — PROGRESS NOTES
Discharge orders noted. Additional clinical references attached. Patient's discharge instructions given by bedside RN and reviewed via this VN.  Education provided on new and previous medications, diagnosis, and follow-up appointments.  New medications sent to patient's pharmacy. Patient verbalized understanding and teach back method was used. Patient's ride/transportation home at bedside. All questions answered. Transport to [facility] requested. Floor nurse notified.              05/31/25 1404    Notification    Notified Of Discharge Status   Admission   Communication Issues? None   Shift   Virtual Nurse - Rounding Complete   Virtual Nurse - Patient Verbalized Approval Of Camera Use   Safety/Activity   Patient Rounds bed in low position;call light in patient/parent reach;clutter free environment maintained;visualized patient   Safety Promotion/Fall Prevention family to remain at bedside;side rails raised x 2   Activity Management Sitting at edge of bed - L2

## 2025-05-31 NOTE — PLAN OF CARE
Problem: Adult Inpatient Plan of Care  Goal: Plan of Care Review  Outcome: Met  Goal: Patient-Specific Goal (Individualized)  Outcome: Met  Goal: Absence of Hospital-Acquired Illness or Injury  Outcome: Met  Goal: Optimal Comfort and Wellbeing  Outcome: Met  Goal: Readiness for Transition of Care  Outcome: Met     Problem: Diabetes Comorbidity  Goal: Blood Glucose Level Within Targeted Range  Outcome: Met     Problem: Comorbidity Management  Goal: Blood Pressure in Desired Range  Outcome: Met     Problem: Fall Injury Risk  Goal: Absence of Fall and Fall-Related Injury  Outcome: Met     Problem: Anemia  Goal: Anemia Symptom Improvement  Outcome: Met     Problem: Fatigue  Goal: Improved Activity Tolerance  Outcome: Met     Problem: Gastrointestinal Bleeding  Goal: Optimal Coping with Acute Illness  Outcome: Met  Goal: Hemostasis  Outcome: Met     Problem: Acute Kidney Injury/Impairment  Goal: Fluid and Electrolyte Balance  Outcome: Met  Goal: Improved Oral Intake  Outcome: Met  Goal: Effective Renal Function  Outcome: Met

## 2025-05-31 NOTE — PLAN OF CARE
Problem: Adult Inpatient Plan of Care  Goal: Plan of Care Review  Outcome: Progressing  Flowsheets (Taken 5/31/2025 2040)  Plan of Care Reviewed With: patient

## 2025-05-31 NOTE — PROGRESS NOTES
The sw spoke to the pt and his brother is transporting him home at d/c. The sw stressed the importance of the pt going to his hsp f/u's and taking his medications. The pt has no further questions or Case Management needs and is clear to d/c once his transport arrives.    [facility] - Telemetry  Discharge Final Note    Primary Care Provider: No primary care provider on file.    Expected Discharge Date: 5/31/2025    Final Discharge Note (most recent)       Final Note - 05/30/25 1159          Final Note    Assessment Type Discharge Planning Brief Assessment                     Important Message from Medicare             Contact Info       [facility] [place] [address]   Phone: [phone]       Next Steps: Follow up on 6/2/2025    Instructions: 10:00am [facility] - Gastroenterology   Specialty: Gastroenterology    [address]  [address]  [place] [address]   Phone: [phone]       Next Steps: Follow up    Instructions: The sw sent an IB to the clinic listed above for a follow up. The staff will call the pt with the appt. The pt was instructed to call the number listed above to check on the status of the appt.

## 2025-06-01 NOTE — DISCHARGE SUMMARY
[facility] Medicine  Discharge Summary      Patient Name: Oneil Meza  MRN: [id number]  [facility]: [id number]  Patient Class: IP- Inpatient  Admission Date: 5/29/2025  Hospital Length of Stay: 1 days  Discharge Date and Time: 5/31/2025  2:15 PM  Discharging Provider: Stephanie Joe MD  Primary Care Provider: No primary care provider on file.      Primary Care Team: Networked reference to record PCT     HPI:     Mr. Meza is a 75yo man with a past medical history of CAD, HTN, Pafib, CVA 2015, cirrhosis, hepatocellular carcinoma Tx with resection, and PAD (Femoral artery stenting on the right January 5, 2022).    He is on Eliquis for his Afib.     As part of workup for a lung nodule the patient underwent a PET scan May 11, 2023 and was found to have a hypermetabolic mass in segment 4B the liver measuring 1.8 cm with an SUV of 7. MRI of the liver on July 3, 2023 showed a 1.8 x 1.7 cm enhancing mass of the left lobe of the liver without washout. It does not appear that an alpha fetoprotein was sent. The patient then underwent a resection of tumor on September 5, 2023. 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     He now comes to the ED at [facility] due to abnormal labs with low Hg levels on routine draw with Hg of 6.5.  Per ED, The patient denies any black or bloody stools or hematemesis. He was recently admitted to [facility] a month ago. Hemoglobin at that time was a a proximally 6.3 and he was occult positive. He was transfused a unit of blood and saw Gastroenterology who performed an EGD but did not notice any acute bleeding. There were no varices present.     Dr. Bettencourt with [facility] was consulted and is planning to take for colonoscopy in the am.  He recommended holding Eliquis for now.  NPO at midnight.  He has ordered bowel  "prep.  He was found to have Hg 7.1g in ED with neutropenia to ANC 1215.     VS in the ED were BP (!) 138/61 -> 146/69   Pulse 54 -> 66   Temp 98 °F (36.7 °C) (Oral)   Resp 18   Ht 5' 8" (1.727 m)   Wt 76 kg (167 lb 8.8 oz)   SpO2 99%   BMI 25.48 kg/m².     Labs 5/29: WBC 1.87 (S 65% - ANC 1215), Hg 7.1, , B12 453, Cr 2.2 baseline 1.9-2.2 Cr, K 4.2, Gluc 188, no UA done, Occult blood stool positive today.     CXR not done in ED     CT A/P without contrast 5/29:  Cirrhosis and mild splenomegaly.  Postoperative changes from left partial hepatectomy for HCC. No new liver lesions, noting that evaluation is limited without contrast.  No lymphadenopathy.  Anemia, but no identifiable source.      Procedure(s) (LRB):  COLONOSCOPY (N/A)      Hospital Course:   He was transfused 1 u PRBCs. GI took him for colonoscopy. No acute bleeding found. His hgb remained stable thereafter. He was cleared for discharge.      Goals of Care Treatment Preferences:  Code Status: Full Code      SDOH Screening:  The patient was screened for food insecurity, housing instability, transportation needs, utility difficulties, and interpersonal safety. The social determinant(s) of health identified as a concern this admission are:  Food insecurity      Social Drivers of Health with Concerns     Food Insecurity: Food Insecurity Present (5/29/2025)   Housing Stability: Low Risk  (5/29/2025)   Recent Concern: Housing Stability - High Risk (4/27/2025)        Consults:   Consults (From admission, onward)          Status Ordering Provider     Gastroenterology  Once        Provider:  (Not yet assigned)    Completed NEO RONDON     Inpatient consult to Social Work  Once        Provider:  (Not yet assigned)    Completed DEEPIKA LEUNG            Assessment & Plan  GIB (gastrointestinal bleeding)  Suspect acute on chronic blood loss from colon mass    S/p colonoscopy    Impression:            - Preparation of the colon was poor.      "                     - Malignant tumor at the hepatic flexure. Biopsied. Tattooed. (location suspected hepatic flexure, given looping, tattoo was placed just distally to ensure marking)                          - Multiple small polyps in the sigmoid colon, in the transverse colon and in the ascending colon. Resection not attempted.                          - The examination was otherwise normal on direct and retroflexion views.     Recommendation:                     - Return patient to hospital dai for possible discharge same day.                          - Advance diet as tolerated.                          - Continue present medications.                          - Await pathology results.                          - Repeat colonoscopy in 1 year for surveillance.                          - Refer to colorectal surgeon. Obtain staging CT with IV contrast, chest abd pelvis. Refer to oncologist.    ABLA (acute blood loss anemia)  Anemia is likely due to acute blood loss which was from GIB. Most recent hemoglobin and hematocrit are listed below.  Recent Labs     05/30/25  0431 05/31/25  0506   HGB 6.1* 7.2*   HCT 22.1* 25.6*     Transfuse 1 u PRBCs today 5/30  Hgb has remained stable       History of primary malignant neoplasm of liver  Per records:  As part of workup for a lung nodule the patient underwent a PET scan May 11, 2023 and was found to have a hypermetabolic mass in segment 4B the liver measuring 1.8 cm with an SUV of 7. MRI of the liver on July 3, 2023 showed a 1.8 x 1.7 cm enhancing mass of the left lobe of the liver without washout.    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 Resected on September 5, 2023.    periodic surveillance, follow-up will be dependent on the results of the tumor board discussion (10/ 2023).    No documented follow up since. Resume OP f/u with heme/ onc upon DC    PAD (peripheral artery disease)  Chronic and  stable    HTN (hypertension)  Stable   DM (diabetes mellitus)  Excellent disease control    Last A1c reviewed-   Lab Results   Component Value Date    HGBA1C 6.3 (H) 04/27/2025     Continue chronic home meds  A-fib  Continue chronic home meds    Chronic diastolic congestive heart failure  Chronic and stable     Pulmonary hypertension  Stable     REYMUNDO (acute kidney injury)    Recent Labs     05/30/25  0431 05/31/25  0506   CREATININE 1.8* 1.8*   EGFRNORACEVR 39* 39*     - REYMUNDO is improving  - fu with PCP      Final Active Diagnoses:    Diagnosis Date Noted POA    PRINCIPAL PROBLEM:  GIB (gastrointestinal bleeding) [K92.2] 05/30/2025 Yes    ABLA (acute blood loss anemia) [D62] 05/30/2025 Yes    Chronic diastolic congestive heart failure [I50.32] 05/30/2025 Yes    Pulmonary hypertension [I27.20] 05/30/2025 Yes    REYMUNDO (acute kidney injury) [N17.9] 05/30/2025 Yes    A-fib [I48.91] 04/27/2025 Yes     Chronic    PAD (peripheral artery disease) [I73.9] 04/27/2025 Yes    HTN (hypertension) [I10] 04/27/2025 Yes    DM (diabetes mellitus) [E11.9] 04/27/2025 Yes    History of primary malignant neoplasm of liver [Z85.05] 03/02/2024 Yes      Problems Resolved During this Admission:       Discharged Condition: stable    Disposition: Home or Self Care    Follow Up:   Follow-up Information       [facility] - Follow up on 6/2/2025.    Why: 10:00am Gillian Salazar NP  Contact information:  [address] [place] [address] [phone]               [facility] - Gastroenterology Follow up.    Specialty: Gastroenterology  Why: The sw sent an IB to the clinic listed above for a follow up. The staff will call the pt with the appt. The pt was instructed to call the number listed above to check on the status of the appt.  Contact information:  [address] [place] [address] [phone]  Additional information:  Please park in Lot C or D and use [employer]olly keith. Take [employer] elevators.    Please  check-in for all clinic appointments on the 1st floor, at the desk, in the [facility] before coming up to the clinic for your appointment.                         Patient Instructions:      Diet diabetic     Diet Cardiac     Activity as tolerated       Significant Diagnostic Studies: as above    Pending Diagnostic Studies:       Procedure Component Value Units Date/Time    Specimen to Pathology Gastrointestinal tract [[id number]] Collected: 05/30/25 1618    Order Status: Sent Lab Status: No result     Specimen: Tissue from Large Intestine, Colon, Hepatic Flexure            Medications:  Reconciled Home Medications:      Medication List        PAUSE taking these medications      amLODIPine 10 MG tablet  Wait to take this until your doctor or other care provider tells you to start again.  Commonly known as: NORVASC  Take 1 tablet (10 mg total) by mouth once daily.            CHANGE how you take these medications      apixaban 2.5 mg Tab  Commonly known as: ELIQUIS  Take 1 tablet (2.5 mg total) by mouth 2 (two) times daily.  What changed:   medication strength  how much to take            CONTINUE taking these medications      acetaminophen 500 MG tablet  Commonly known as: TYLENOL  Take 1,000 mg by mouth every 8 (eight) hours as needed for Pain.     albuterol 90 mcg/actuation inhaler  Commonly known as: PROVENTIL/VENTOLIN HFA  Inhale 2 puffs into the lungs every 4 (four) hours as needed for Shortness of Breath or Wheezing.     atorvastatin 20 MG tablet  Commonly known as: LIPITOR  Take 20 mg by mouth once daily.     cyanocobalamin (vitamin B-12) 1,000 mcg/mL Kit  Inject 1,000 mcg as directed every 30 days.     ferrous gluconate 324 mg (37.5 mg iron) Tab tablet  Take 324 mg by mouth 2 (two) times daily.     glipiZIDE 10 MG Tr24  Commonly known as: GLUCOTROL  Take 10 mg by mouth daily with breakfast.     hydrOXYzine HCL 10 MG Tab  Commonly known as: ATARAX  Take 10 mg by mouth 3 (three) times daily as needed.      K-Phos-NeutraL 250 mg tablet  Generic drug: k phos di & mono-sod phos mono  Take 1 tablet by mouth 2 (two) times daily.     multivitamin per tablet  Commonly known as: THERAGRAN  Take 1 tablet by mouth once daily.     nitroGLYCERIN 0.4 MG SL tablet  Commonly known as: NITROSTAT  Place 0.4 mg under the tongue every 5 (five) minutes as needed for Chest pain.     pantoprazole 40 MG tablet  Commonly known as: PROTONIX  Take 40 mg by mouth once daily.     polyethylene glycol 17 gram/dose powder  Commonly known as: GLYCOLAX  Take 17 g by mouth daily as needed for Constipation.            STOP taking these medications      chlorthalidone 25 MG Tab  Commonly known as: HYGROTEN     famotidine 20 MG tablet  Commonly known as: PEPCID     lisinopriL 40 MG tablet  Commonly known as: PRINIVIL,ZESTRIL     losartan 100 MG tablet  Commonly known as: COZAAR     metoprolol succinate 50 MG 24 hr tablet  Commonly known as: TOPROL-XL     pioglitazone 30 MG tablet  Commonly known as: ACTOS     sodium bicarbonate 650 MG tablet     tamsulosin 0.4 mg Cap  Commonly known as: FLOMAX     urea 20 % Crea     VITAMIN D2 1,250 mcg (50,000 unit) capsule  Generic drug: ergocalciferol              Indwelling Lines/Drains at time of discharge:   Lines/Drains/Airways       None                 Time spent on the discharge of patient: 32 minutes  Physical exam stable        Stephanie Joe MD  Department of Hospital Medicine  [facility] - Telemetry

## 2025-06-01 NOTE — HOSPITAL COURSE
He was transfused 1 u PRBCs. GI took him for colonoscopy. No acute bleeding found. His hgb remained stable thereafter. He was cleared for discharge.

## 2025-06-01 NOTE — ASSESSMENT & PLAN NOTE
Recent Labs     05/30/25  0431 05/31/25  0506   CREATININE 1.8* 1.8*   EGFRNORACEVR 39* 39*     - REYMUNDO is improving  - fu with PCP

## 2025-06-03 LAB — RBCS: NORMAL

## 2025-06-05 LAB
ESTROGEN SERPL-MCNC: ABNORMAL PG/ML
INSULIN SERPL-ACNC: ABNORMAL U[IU]/ML
LAB AP CLINICAL INFORMATION: ABNORMAL
LAB AP DIAGNOSIS CATEGORY: ABNORMAL
LAB AP GROSS DESCRIPTION: ABNORMAL
LAB AP PERFORMING LOCATION(S): ABNORMAL
LAB AP REPORT FOOTNOTES: ABNORMAL

## 2025-06-06 ENCOUNTER — RESULTS FOLLOW-UP (OUTPATIENT)
Dept: GASTROENTEROLOGY | Facility: CLINIC | Age: 75
End: 2025-06-06

## 2025-06-06 DIAGNOSIS — C18.9 COLON ADENOCARCINOMA: Primary | ICD-10-CM

## 2025-06-19 NOTE — PROGRESS NOTES
PATIENT: Oneil Meza  MRN: [id number]  DATE: 6/23/2025    Scribe Attestation: I, Bia Cabrera, am scribing under the direction and in the presence of Therese Mathews MD. I attest that this documentation is true, accurate and complete to the best of my knowledge.     Diagnosis:   1. Mass of hepatic flexure of colon    2. Colon adenocarcinoma    3. Malignant neoplasm of hepatic flexure    4. History of hepatocellular carcinoma    5. Abdominal pain, unspecified abdominal location    6. Lung nodule seen on imaging study    7. Other specified counseling    8. History of iron deficiency anemia    9. Iron deficiency anemia due to chronic blood loss    10. Vitamin B12 deficiency    11. Chronic abdominal pain      Chief Complaint: Evaluation of malignant tumor at hepatic flexure    Oncologic History:   Per heme/onc note by Dr. Junior Méndez on 10/09/2023  As part of workup for a lung nodule the patient underwent a PET scan May 11, 2023 and was found to have a hypermetabolic mass in segment 4B the liver measuring 1.8 cm with an SUV of 7. MRI of the liver on July 3, 2023 showed a 1.8 x 1.7 cm enhancing mass of the left lobe of the liver without washout. It does not appear that an alpha fetoprotein was sent. The patient then underwent a resection of tumor on September 5, 2023. 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      Oncologic History     Oncologic Treatment     Pathology       Subjective:    History of Present Illness: Mr. Meza is a 74 y.o. male who presents for evaluation and management of malignant tumor at hepatic flexure. Patient was recently hospitalized for a GI bleed in 05/2025. He was discharged on 05/31/2025. Patient follows with GI Dr. Korey Bettencourt. Patient has an appointment with colo-rectal surgeon Dr. Lamonte Calderon on 07/08/2025.    Patient  previously followed with heme/onc Dr. Junior Méndez due to history of primary malignant neoplasm of liver. Last seen on 10/09/2023. He is s/p resection on 09/05/2023. Patient refused adjuvant therapy at that time. Patient was instructed to follow up with Dr. Kunz for periodic surveillance. No follow up notes found at this time.    Today, patient endorses chronic abdominal pain that began s/p his resection in 2023. He reports that pain is unchanged from that time. Denies constipation, black/bloody stools, nausea, vomiting, and heartburn. Denies CP, palpitations, and leg pain.    Patient complains of SOB with exertion. He notes that symptoms began after having a stent placed in his LLE in 2005. He also endorses intermittent lower extremity swelling since that surgery. He reports history of smoking. He states that he quit a long time ago.    Patient endorses dizziness when suddenly changing positions.    Past medical, surgical, family, and social histories have been reviewed and updated below.    Past Medical History:   Past Medical History:   Diagnosis Date    Anticoagulant long-term use     Coronary artery disease     Hypertension      Past Surgical History:   Past Surgical History:   Procedure Laterality Date    COLONOSCOPY N/A 5/30/2025    Procedure: COLONOSCOPY;  Surgeon: Korey Bettencourt MD;  Location: [facility] ENDO;  Service: Endoscopy;  Laterality: N/A;    ESOPHAGOGASTRODUODENOSCOPY N/A 4/28/2025    Procedure: EGD (ESOPHAGOGASTRODUODENOSCOPY);  Surgeon: Korey Bettencourt MD;  Location: [facility] ENDO;  Service: Endoscopy;  Laterality: N/A;    VASCULAR SURGERY      stent to left leg artery-per pt report     Family History: No family history on file.    Social History:  reports that he quit smoking about 9 years ago. His smoking use included cigarettes. He started smoking about 61 years ago. He has a 52 pack-year smoking history. He has never used smokeless tobacco. He reports current drug use. He reports that he does  "not drink alcohol.    Allergies:  Review of patient's allergies indicates:   Allergen Reactions    Iodine and iodide containing products      Medications:  Current Medications[1]    Review of Systems  Comprehensive ROS is negative except for what was mentioned in HPI.     ECOG Performance Status:   ECOG SCORE           Objective:      Vitals:   Vitals:    06/23/25 1053   BP: (!) 141/72   Pulse: 78   Temp: 98.3 °F (36.8 °C)   SpO2: 97%   Weight: 73.4 kg (161 lb 13.1 oz)   Height: 5' 8" (1.727 m)     BMI: Body mass index is 24.6 kg/m².    Physical Exam  Vitals and nursing note reviewed.   Constitutional:       General: He is not in acute distress.     Appearance: He is not toxic-appearing or diaphoretic.      Comments: APPEARS ENERGETIC/NORMAL ENERGY   HENT:      Head: Normocephalic and atraumatic.      Right Ear: External ear normal.      Left Ear: External ear normal.      Nose: Nose normal. No congestion or rhinorrhea.      Mouth/Throat:      Mouth: Mucous membranes are moist.      Pharynx: No oropharyngeal exudate or posterior oropharyngeal erythema.      Comments: NO HALITOSIS  Eyes:      General: Lids are normal. Vision grossly intact.      Extraocular Movements: Extraocular movements intact.      Conjunctiva/sclera: Conjunctivae normal.      Pupils: Pupils are equal, round, and reactive to light.   Cardiovascular:      Rate and Rhythm: Normal rate and regular rhythm.      Pulses: Normal pulses.      Heart sounds: Normal heart sounds.   Pulmonary:      Effort: Pulmonary effort is normal. No respiratory distress.      Breath sounds: Normal breath sounds. No stridor. No wheezing, rhonchi or rales.      Comments: NO PLEURITIC CHEST PAIN  Chest:      Chest wall: No tenderness.   Abdominal:      General: Abdomen is flat. A surgical scar is present. Bowel sounds are normal. There is no distension.      Palpations: Abdomen is soft. There is no fluid wave, hepatomegaly, splenomegaly or mass.      Tenderness: There is " abdominal tenderness. There is no right CVA tenderness, left CVA tenderness, guarding or rebound.          Comments: NO ORGANOMEGALY-SEE CHRONIC SCAR DRAWN ABOVE--INTACT, NONTENDER. NOTES SOME TENDERNESS NOT REPRODUCIBLE IN THE SAME LOCATIONS--HAD TO THINK ABOUT WHETHER IT HURT OR NOT, NO REBOUND   Musculoskeletal:         General: No swelling or tenderness. Normal range of motion.      Cervical back: Normal range of motion and neck supple. No rigidity.      Right lower leg: No edema.      Left lower leg: No edema.      Comments: NO CORDS PALPABLE, NEGATIVE HOMANS   Lymphadenopathy:      Head:      Right side of head: No submental, submandibular, tonsillar, preauricular, posterior auricular or occipital adenopathy.      Left side of head: No submental, submandibular, tonsillar, preauricular, posterior auricular or occipital adenopathy.      Cervical: No cervical adenopathy.      Right cervical: No superficial, deep or posterior cervical adenopathy.     Left cervical: No superficial, deep or posterior cervical adenopathy.      Upper Body:      Right upper body: No supraclavicular, axillary or epitrochlear adenopathy.      Left upper body: No supraclavicular, axillary or epitrochlear adenopathy.      Lower Body: No right inguinal adenopathy. No left inguinal adenopathy.      Comments: All pearl basins are nontender   Skin:     General: Skin is warm and dry.      Coloration: Skin is not ashen, cyanotic, jaundiced, mottled or pale.      Findings: No bruising, erythema or rash.      Comments: -NO PETECHIAE  -NO OBVIOUS LESIONS BUT THOROUGH EXAM NOT CARRIED OUT   Neurological:      General: No focal deficit present.      Mental Status: He is alert and oriented to person, place, and time.      Motor: No weakness.      Gait: Gait normal.      Deep Tendon Reflexes: Reflexes abnormal.      Comments: NO CLONUS  ABSENT LHERMITTE'S  Decreased patellar reflexes bilaterally. Patient may not have been fully relaxed during exam.  "  Psychiatric:         Mood and Affect: Mood normal.         Behavior: Behavior normal. Behavior is cooperative.         Judgment: Judgment normal.      Comments: LIVES W HIS BROTHER AND HE FEELS THEY ARE BOTH GOOD SUPPORT SYSTEM FOR ONE ANOTHER-PT PRESENTED INDEPENDENTLY TODATY       Laboratory Data:  Labs have been reviewed.    Lab Results   Component Value Date    WBC 3.43 (L) 06/17/2025    RBC 4.66 06/17/2025    HGB 11.5 (L) 06/17/2025    HCT 38.6 (L) 06/17/2025    MCV 83 06/17/2025    MCH 24.7 (L) 06/17/2025    MCHC 29.8 (L) 06/17/2025    RDW 22.5 (H) 06/17/2025     06/17/2025    MPV  06/17/2025      Comment:      Result Not Available    GRAN 1.0 05/30/2025    LYMPH 17.2 (L) 06/17/2025    LYMPH 0.59 (L) 06/17/2025    MONO 10.2 06/17/2025    MONO 0.35 06/17/2025    EOS 0.9 06/17/2025    EOS 0.03 06/17/2025    BASO 0.01 07/11/2016    EOSINOPHIL 1.0 05/30/2025    BASOPHIL 0.3 06/17/2025    BASOPHIL 0.01 06/17/2025     No results found for: "UIBC", "IRON", "TRANS", "TRANSFERRIN", "TIBC", "LABIRON", "FESATURATED"   No results found for: "FERRITIN"  CMP  Sodium   Date Value Ref Range Status   06/17/2025 139 136 - 145 mmol/L Final   10/09/2023 143 135 - 146 mmol/L Final   07/11/2016 137 136 - 145 mmol/L Final     Potassium   Date Value Ref Range Status   06/17/2025 3.8 3.5 - 5.1 mmol/L Final   10/09/2023 4.2 3.6 - 5.2 mmol/L Final   07/11/2016 3.6 3.5 - 5.1 mmol/L Final     Chloride   Date Value Ref Range Status   06/17/2025 107 95 - 110 mmol/L Final   07/11/2016 97 95 - 110 mmol/L Final     CO2   Date Value Ref Range Status   06/17/2025 24 23 - 29 mmol/L Final   07/11/2016 30 (H) 23 - 29 mmol/L Final     Carbon Dioxide   Date Value Ref Range Status   10/09/2023 25 24 - 32 mmol/L Final     Glucose   Date Value Ref Range Status   06/17/2025 126 (H) 70 - 110 mg/dL Final   07/11/2016 211 (H) 70 - 110 mg/dL Final     BUN   Date Value Ref Range Status   06/17/2025 13 8 - 23 mg/dL Final     Creatinine   Date Value " Ref Range Status   06/17/2025 1.6 (H) 0.5 - 1.4 mg/dL Final     Calcium   Date Value Ref Range Status   06/17/2025 9.8 8.7 - 10.5 mg/dL Final   10/09/2023 10.1 8.4 - 10.3 mg/dL Final   07/11/2016 9.1 8.7 - 10.5 mg/dL Final     Protein Total   Date Value Ref Range Status   06/17/2025 9.2 (H) 6.0 - 8.4 gm/dL Final     Total Protein   Date Value Ref Range Status   07/11/2016 7.8 6.0 - 8.4 g/dL Final     Albumin   Date Value Ref Range Status   06/17/2025 4.3 3.5 - 5.2 g/dL Final   09/11/2023 3.3 (L) 3.4 - 5.0 g/dL Final   07/11/2016 4.1 3.5 - 5.2 g/dL Final     Total Bilirubin   Date Value Ref Range Status   09/11/2023 0.9 <1.3 mg/dL Final   07/11/2016 1.0 0.1 - 1.0 mg/dL Final     Comment:     For infants and newborns, interpretation of results should be based  on gestational age, weight and in agreement with clinical  observations.  Premature Infant recommended reference ranges:  Up to 24 hours.............<8.0 mg/dL  Up to 48 hours............<12.0 mg/dL  3-5 days..................<15.0 mg/dL  6-29 days.................<15.0 mg/dL       Bilirubin Total   Date Value Ref Range Status   06/17/2025 1.4 (H) 0.1 - 1.0 mg/dL Final     Comment:     For infants and newborns, interpretation of results should be based   on gestational age, weight and in agreement with clinical   observations.    Premature Infant recommended reference ranges:   0-24 hours:  <8.0 mg/dL   24-48 hours: <12.0 mg/dL   3-5 days:    <15.0 mg/dL   6-29 days:   <15.0 mg/dL     Alkaline Phosphatase   Date Value Ref Range Status   07/11/2016 62 38 - 126 U/L Final     ALP   Date Value Ref Range Status   06/17/2025 99 40 - 150 unit/L Final     AST   Date Value Ref Range Status   06/17/2025 37 11 - 45 unit/L Final   09/11/2023 36 <45 U/L Final   07/11/2016 28 15 - 46 U/L Final     ALT   Date Value Ref Range Status   06/17/2025 19 10 - 44 unit/L Final   09/11/2023 54 (H) <46 U/L Final   07/11/2016 24 10 - 44 U/L Final     Anion Gap   Date Value Ref Range  Status   06/17/2025 8 8 - 16 mmol/L Final     eGFR   Date Value Ref Range Status   06/17/2025 44 (L) >60 mL/min/1.73/m2 Final     Comment:     Estimated GFR calculated using the CKD-EPI creatinine (2021) equation.     Pathology:   Specimen to Pathology Gastrointestinal tract on 05/30/2025  Final Diagnosis   Fragments of tubular adenoma with at least high-grade dysplasia/intramucosal carcinoma, focally suspicious for invasion.  Note:  In the context of a mass seen on endoscopy, a more advanced unsampled component of this lesion can not be excluded.  Advise further evaluation as indicated.  This case was reviewed by  who concurs with the diagnosis.     Imaging:    CT Abdomen Pelvis  Without Contrast on 05/29/2025  Impression:  Cirrhosis and mild splenomegaly.     Postoperative changes from left partial hepatectomy for HCC.  No new liver lesions, noting that evaluation is limited without contrast.     No lymphadenopathy.     Anemia, but no identifiable source.     Other findings as described.    CT Abdomen Pelvis  Without Contrast on 06/17/2025  FINDINGS:  Images of the lower thorax are remarkable for bilateral dependent atelectasis noting there may be trace right pleural effusion.     The adrenal glands and gallbladder are grossly unremarkable.  Patient has history of partial hepatectomy in the setting of cirrhosis.  The pancreas is grossly unremarkable.  The spleen is prominent.  The stomach is relatively decompressed without wall thickening.  No significant abdominal lymphadenopathy.     There is no hydronephrosis or nephrolithiasis.  There is a subcentimeter low attenuating lesion within the interpolar region of the right kidney, too small for characterization.  There is bilateral perinephric fat stranding, left greater than right.  The bilateral ureters are grossly unremarkable without calculi seen.  The urinary bladder is mildly prominent noting there may be some wall thickening.  There is questionable  surgical change of TURP procedure.     The large bowel is grossly unremarkable.  The terminal ileum is unremarkable.  The appendix is unremarkable.  The small bowel is grossly unremarkable.  There are a few scattered shotty periaortic, pericaval, and mesenteric lymph nodes.  There is atherosclerotic calcification of the aorta and its branches.  There is surgical change of right common femoral/superficial femoral bypass, partially visualized.     There is osteopenia.  There are degenerative changes of the bilateral sacroiliac joints, pubic symphysis, and spine.  There is a stable sclerotic focus within the right iliac wing.  No significant inguinal lymphadenopathy.     Impression:  1. There is bilateral perinephric fat stranding however greater on the left.  Sequela of recently passed calculus or infection could present in this fashion, correlation with urinalysis advised.  2. Urinary bladder wall thickening, superimposed cystitis not excluded.  3. Please see above for several additional findings.    Procedures:   Colonoscopy on 05/30/2025   Impression:              - Preparation of the colon was poor.   - Malignant tumor at the hepatic flexure. Biopsied. Tattooed. (location suspected hepatic flexure, given looping, tattoo was placed just distally to ensure marking)   - Multiple small polyps in the sigmoid colon, in the transverse colon and in the ascending colon. Resection not attempted.   - The examination was otherwise normal on direct and retroflexion views.     Recommendation:          - Return patient to hospital dai for possible discharge same day.   - Advance diet as tolerated.   - Continue present medications.   - Await pathology results.   - Repeat colonoscopy in 1 year for surveillance.   - Refer to colorectal surgeon. Obtain staging CT with IV contrast, chest abd pelvis. Refer to oncologist.     Assessment:       1. Mass of hepatic flexure of colon    2. Colon adenocarcinoma    3. Malignant neoplasm of  hepatic flexure    4. History of hepatocellular carcinoma    5. Abdominal pain, unspecified abdominal location    6. Lung nodule seen on imaging study    7. Other specified counseling    8. History of iron deficiency anemia    9. Iron deficiency anemia due to chronic blood loss    10. Vitamin B12 deficiency    11. Chronic abdominal pain      Mass of hepatic flexure of colon:  - Discussed history of hepatocellular carcinoma s/p resection in 09/2023 with patient. Discussed need for further imaging and lab studies for current diagnosis. Explained how if the cancer of the colon has metastasized, then chemotherapy/perhaps immuno tx will be needed. Counseled on seeing colo-rectal surgeon to discuss surgery to remove cancer pending staging. Patient acknowledged understanding.  - Patient has an appointment with colo-rectal surgeon Dr. Lamonte Calderon on 07/08/2025.  - Ordered PET Scan today  - Ordered AFP tumor marker, CBC, CEA, CMP, and ferritin to be collected today    History of hepatocellular carcinoma:  - Per heme/onc note by Dr. Junior Méndez on 10/09/2023:  As part of workup for a lung nodule the patient underwent a PET scan May 11, 2023 and was found to have a hypermetabolic mass in segment 4B the liver measuring 1.8 cm with an SUV of 7. MRI of the liver on July 3, 2023 showed a 1.8 x 1.7 cm enhancing mass of the left lobe of the liver without washout. It does not appear that an alpha fetoprotein was sent. The patient then underwent a resection of tumor on September 5, 2023. 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     Iron deficiency anemia:  - Patient is currently on oral iron supplementation.  - If iron levels are low today, will consider starting IV iron infusions. Discussed IV iron infusions with patient.   - Ordered CBC, CMP, and ferritin to be  collected today    Vitamin B12 deficiency (diagnosed 2 years ago):  - On monthly B12 injections -lab pending    Chronic abdominal pain:  - Patient reports that he has had this pain for years since his hepatocellular ca surgery--does not limit him-feels tight a bit, and it is unchanged and he has no new pain.  - He also notes that he was prescribed pain medication after his recent colonoscopy-bottle reviewed--1/2 tablet prn pain--6 tablets. Explained to patient that pain medication is often given for postoperative pain, but this medication is not continued unless indicated. Instructed patient to follow up with prescribing doctor if he feels that he needs a refill of his pain medication. Patient acknowledged understanding.     Plan:     Today I counseled pt about the following:  MEDICATION INSTRUCTIONS, TODAYS PLANS: FOLLOW UP, REFERRALS, TREATMENT DETAILS, LIVING WILL, and ADVANCED DIRECTIVES  Patient exhibits understanding of all counseling today.    RTC in 1 week to review labs    I spent a total of 46minutes in a combination of focused physical exam and history, reviewing outside records, reviewing any available radiographs, counseling, communicating with other health care professionals regarding this patients medical condition, independently interpreting results, developing diagnostic and treatment plan and documenting in the EMR. Pre chart, orders, counseling pain management, NH colon cancer, lung nodule sig etc, role of tumor markers    Participating Clinicians:  PCP-see story board    Disclaimer: This note was prepared using voice recognition system and is likely to have sound alike errors.  Please interpret accordingly.    Therese Mathews M.D.  Hematology/Oncology  [facility] - [address], [address]  [place] [address]  Phone: [phone]  Fax: [phone]         [1]   Current Outpatient Medications   Medication Sig Dispense Refill    acetaminophen (TYLENOL) 500 MG  tablet Take 1,000 mg by mouth every 8 (eight) hours as needed for Pain.      albuterol (PROVENTIL/VENTOLIN HFA) 90 mcg/actuation inhaler Inhale 2 puffs into the lungs every 4 (four) hours as needed for Shortness of Breath or Wheezing.      [Paused] amLODIPine (NORVASC) 10 MG tablet Take 1 tablet (10 mg total) by mouth once daily. 90 tablet 3    apixaban (ELIQUIS) 2.5 mg Tab Take 1 tablet (2.5 mg total) by mouth 2 (two) times daily. 30 tablet 0    atorvastatin (LIPITOR) 20 MG tablet Take 20 mg by mouth once daily.      ciprofloxacin HCl (CIPRO) 500 MG tablet Take 1 tablet (500 mg total) by mouth 2 (two) times daily. for 10 days 20 tablet 0    cyanocobalamin, vitamin B-12, 1,000 mcg/mL Kit Inject 1,000 mcg as directed every 30 days.      ferrous gluconate 324 mg (37.5 mg iron) Tab tablet Take 324 mg by mouth 2 (two) times daily.      glipiZIDE (GLUCOTROL) 10 MG TR24 Take 10 mg by mouth daily with breakfast.      HYDROcodone-acetaminophen (NORCO) 7.5-325 mg per tablet Take 0.5 tablets by mouth nightly as needed for Pain. 6 tablet 0    hydrOXYzine HCL (ATARAX) 10 MG Tab Take 10 mg by mouth 3 (three) times daily as needed.      K-PHOS-NEUTRAL 250 mg tablet Take 1 tablet by mouth 2 (two) times daily.      multivitamin (THERAGRAN) per tablet Take 1 tablet by mouth once daily.      nitroGLYCERIN (NITROSTAT) 0.4 MG SL tablet Place 0.4 mg under the tongue every 5 (five) minutes as needed for Chest pain.      pantoprazole (PROTONIX) 40 MG tablet Take 40 mg by mouth once daily.      polyethylene glycol (GLYCOLAX) 17 gram/dose powder Take 17 g by mouth daily as needed for Constipation.       No current facility-administered medications for this visit.

## 2025-06-23 ENCOUNTER — OFFICE VISIT (OUTPATIENT)
Dept: HEMATOLOGY/ONCOLOGY | Facility: CLINIC | Age: 75
End: 2025-06-23
Payer: COMMERCIAL

## 2025-06-23 VITALS
SYSTOLIC BLOOD PRESSURE: 141 MMHG | OXYGEN SATURATION: 97 % | DIASTOLIC BLOOD PRESSURE: 72 MMHG | BODY MASS INDEX: 24.52 KG/M2 | HEIGHT: 68 IN | TEMPERATURE: 98 F | HEART RATE: 78 BPM | WEIGHT: 161.81 LBS

## 2025-06-23 DIAGNOSIS — R91.1 LUNG NODULE SEEN ON IMAGING STUDY: ICD-10-CM

## 2025-06-23 DIAGNOSIS — R10.9 CHRONIC ABDOMINAL PAIN: ICD-10-CM

## 2025-06-23 DIAGNOSIS — C18.9 COLON ADENOCARCINOMA: ICD-10-CM

## 2025-06-23 DIAGNOSIS — K63.89 MASS OF HEPATIC FLEXURE OF COLON: Primary | ICD-10-CM

## 2025-06-23 DIAGNOSIS — G89.29 CHRONIC ABDOMINAL PAIN: ICD-10-CM

## 2025-06-23 DIAGNOSIS — R10.9 ABDOMINAL PAIN, UNSPECIFIED ABDOMINAL LOCATION: ICD-10-CM

## 2025-06-23 DIAGNOSIS — D50.0 IRON DEFICIENCY ANEMIA DUE TO CHRONIC BLOOD LOSS: ICD-10-CM

## 2025-06-23 DIAGNOSIS — C18.3 MALIGNANT NEOPLASM OF HEPATIC FLEXURE: ICD-10-CM

## 2025-06-23 DIAGNOSIS — Z71.89 OTHER SPECIFIED COUNSELING: ICD-10-CM

## 2025-06-23 DIAGNOSIS — Z85.05 HISTORY OF HEPATOCELLULAR CARCINOMA: ICD-10-CM

## 2025-06-23 DIAGNOSIS — Z86.2 HISTORY OF IRON DEFICIENCY ANEMIA: ICD-10-CM

## 2025-06-23 DIAGNOSIS — E53.8 VITAMIN B12 DEFICIENCY: ICD-10-CM

## 2025-06-23 PROCEDURE — 99204 OFFICE O/P NEW MOD 45 MIN: CPT | Mod: S$GLB,,, | Performed by: INTERNAL MEDICINE

## 2025-06-23 PROCEDURE — 1111F DSCHRG MED/CURRENT MED MERGE: CPT | Mod: CPTII,S$GLB,, | Performed by: INTERNAL MEDICINE

## 2025-06-23 PROCEDURE — 99999 PR PBB SHADOW E&M-EST. PATIENT-LVL IV: CPT | Mod: PBBFAC,,, | Performed by: INTERNAL MEDICINE

## 2025-06-23 PROCEDURE — 1125F AMNT PAIN NOTED PAIN PRSNT: CPT | Mod: CPTII,S$GLB,, | Performed by: INTERNAL MEDICINE

## 2025-06-23 PROCEDURE — 1101F PT FALLS ASSESS-DOCD LE1/YR: CPT | Mod: CPTII,S$GLB,, | Performed by: INTERNAL MEDICINE

## 2025-06-23 PROCEDURE — 1159F MED LIST DOCD IN RCRD: CPT | Mod: CPTII,S$GLB,, | Performed by: INTERNAL MEDICINE

## 2025-06-23 PROCEDURE — 3077F SYST BP >= 140 MM HG: CPT | Mod: CPTII,S$GLB,, | Performed by: INTERNAL MEDICINE

## 2025-06-23 PROCEDURE — 3288F FALL RISK ASSESSMENT DOCD: CPT | Mod: CPTII,S$GLB,, | Performed by: INTERNAL MEDICINE

## 2025-06-23 PROCEDURE — 1160F RVW MEDS BY RX/DR IN RCRD: CPT | Mod: CPTII,S$GLB,, | Performed by: INTERNAL MEDICINE

## 2025-06-23 PROCEDURE — 3078F DIAST BP <80 MM HG: CPT | Mod: CPTII,S$GLB,, | Performed by: INTERNAL MEDICINE

## 2025-07-01 NOTE — PROGRESS NOTES
PATIENT: Oneil Meza  MRN: [id number]  DATE: 7/2/2025    Scribe Attestation: I, Bia Cabrera, am scribing under the direction and in the presence of Therese Mathews MD. I attest that this documentation is true, accurate and complete to the best of my knowledge.     Diagnosis:   1. Malignant neoplasm of hepatic flexure    2. Mass of hepatic flexure of colon    3. Colon adenocarcinoma    4. History of hepatocellular carcinoma    5. Other specified counseling    6. Iron deficiency anemia due to chronic blood loss    7. History of iron deficiency anemia    8. Vitamin B12 deficiency    9. Lung nodule seen on imaging study    10. Chronic renal impairment, stage 3b    11. Chronic leukopenia      Chief Complaint: Malignant tumor at hepatic flexure    Oncologic History:   Per heme/onc note by Dr. Junior Méndez on 10/09/2023  As part of workup for a lung nodule the patient underwent a PET scan May 11, 2023 and was found to have a hypermetabolic mass in segment 4B the liver measuring 1.8 cm with an SUV of 7. MRI of the liver on July 3, 2023 showed a 1.8 x 1.7 cm enhancing mass of the left lobe of the liver without washout. It does not appear that an alpha fetoprotein was sent. The patient then underwent a resection of tumor on September 5, 2023. 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      Oncologic History     Oncologic Treatment     Pathology       Subjective:    History of Present Illness: Mr. Meza is a 74 y.o. male who presents for follow up evaluation and management of malignant tumor at hepatic flexure. Patient was recently hospitalized for a GI bleed in 05/2025. He was discharged on 05/31/2025. Patient follows with GI Dr. Korey Bettencourt. Patient has an appointment with colo-rectal surgeon Dr. Lamonte Calderon on 07/08/2025.    Patient previously followed with  heme/onc Dr. Junior Méndez due to history of primary malignant neoplasm of liver. Last seen on 10/09/2023. He is s/p resection on 09/05/2023. Patient refused adjuvant therapy at that time. Patient was instructed to follow up with Dr. Kunz for periodic surveillance. No follow up notes found at this time.    Today, patient endorses chronic abdominal cramping that began s/p his resection in 2023. He reports that pain is unchanged from that time. Pain is worse in the morning when he wakes up. Denies current pain. He states he has been taking antibiotics to help with the abdominal pain. He denies constipation, black/bloody stools, nausea, vomiting, and heartburn. Denies CP, palpitations, and leg pain. Denies dysuria, hematuria, and difficulty urinating.    Patient remains taking his B12 supplements.    Patient has a PET scan scheduled 07/09/2025 to stage his cancer.     Past medical, surgical, family, and social histories have been reviewed and updated below.    Past Medical History:   Past Medical History:   Diagnosis Date    Anticoagulant long-term use     Coronary artery disease     Hypertension      Past Surgical History:   Past Surgical History:   Procedure Laterality Date    COLONOSCOPY N/A 5/30/2025    Procedure: COLONOSCOPY;  Surgeon: Korey Bettencourt MD;  Location: [facility];  Service: Endoscopy;  Laterality: N/A;    ESOPHAGOGASTRODUODENOSCOPY N/A 4/28/2025    Procedure: EGD (ESOPHAGOGASTRODUODENOSCOPY);  Surgeon: Korey Bettencourt MD;  Location: [facility];  Service: Endoscopy;  Laterality: N/A;    VASCULAR SURGERY      stent to left leg artery-per pt report     Family History: No family history on file.    Social History:  reports that he quit smoking about 9 years ago. His smoking use included cigarettes. He started smoking about 61 years ago. He has a 52 pack-year smoking history. He has never used smokeless tobacco. He reports current drug use. He reports that he does not drink  "alcohol.    Allergies:  Review of patient's allergies indicates:   Allergen Reactions    Iodine and iodide containing products      Medications:  Current Medications[1]    Review of Systems  Comprehensive ROS is negative except for what was mentioned in HPI.     ECOG Performance Status:   ECOG SCORE           Objective:      Vitals:   Vitals:    07/02/25 1025   BP: (!) 141/59   Pulse: 71   Temp: 97.6 °F (36.4 °C)   SpO2: 98%   Weight: 74.9 kg (165 lb 2 oz)   Height: 5' 8" (1.727 m)     BMI: Body mass index is 25.11 kg/m².    Physical Exam  Vitals and nursing note reviewed.   Constitutional:       General: He is not in acute distress.     Appearance: He is not toxic-appearing or diaphoretic.      Comments: APPEARS ENERGETIC/NORMAL ENERGY    Wt up 4# in a week, no obv edema or inc abd distention   HENT:      Head: Normocephalic and atraumatic.      Right Ear: External ear normal.      Left Ear: External ear normal.      Nose: Nose normal. No congestion or rhinorrhea.      Mouth/Throat:      Mouth: Mucous membranes are moist.      Pharynx: No oropharyngeal exudate or posterior oropharyngeal erythema.      Comments: NO HALITOSIS  Eyes:      General: Lids are normal. Vision grossly intact.      Extraocular Movements: Extraocular movements intact.      Conjunctiva/sclera: Conjunctivae normal.      Pupils: Pupils are equal, round, and reactive to light.   Cardiovascular:      Rate and Rhythm: Normal rate and regular rhythm.      Pulses: Normal pulses.      Heart sounds: Normal heart sounds.   Pulmonary:      Effort: Pulmonary effort is normal. No respiratory distress.      Breath sounds: Normal breath sounds. No stridor. No wheezing, rhonchi or rales.      Comments: NO PLEURITIC CHEST PAIN  Chest:      Chest wall: No tenderness.   Abdominal:      General: Abdomen is flat. A surgical scar is present. Bowel sounds are normal. There is no distension.      Palpations: Abdomen is soft. There is no fluid wave, hepatomegaly, " splenomegaly or mass.      Tenderness: There is abdominal tenderness. There is no right CVA tenderness, left CVA tenderness, guarding or rebound.          Comments: NO ORGANOMEGALY-SEE CHRONIC SCAR DRAWN ABOVE--INTACT, NONTENDER   Musculoskeletal:         General: No swelling or tenderness. Normal range of motion.      Cervical back: Normal range of motion and neck supple. No rigidity.      Right lower leg: No edema.      Left lower leg: No edema.      Comments: NO CORDS PALPABLE, NEGATIVE HOMANS   Lymphadenopathy:      Head:      Right side of head: No submental, submandibular, tonsillar, preauricular, posterior auricular or occipital adenopathy.      Left side of head: No submental, submandibular, tonsillar, preauricular, posterior auricular or occipital adenopathy.      Cervical: No cervical adenopathy.      Right cervical: No superficial, deep or posterior cervical adenopathy.     Left cervical: No superficial, deep or posterior cervical adenopathy.      Lower Body: No right inguinal adenopathy. No left inguinal adenopathy.      Comments: All pearl basins are nontender   Skin:     General: Skin is warm and dry.      Coloration: Skin is not ashen, cyanotic, jaundiced, mottled or pale.      Findings: No bruising, erythema or rash.      Comments: -NO PETECHIAE  -NO OBVIOUS LESIONS BUT THOROUGH EXAM NOT CARRIED OUT   Neurological:      General: No focal deficit present.      Mental Status: He is alert and oriented to person, place, and time.      Motor: No weakness.      Gait: Gait normal.      Comments: NO CLONUS  ABSENT LHERMITTE'S   Psychiatric:         Mood and Affect: Mood normal.         Behavior: Behavior normal. Behavior is cooperative.         Judgment: Judgment normal.      Comments: LIVES W HIS BROTHER AND HE FEELS THEY ARE BOTH GOOD SUPPORT SYSTEM FOR ONE ANOTHER-PT PRESENTED INDEPENDENTLY TODAY       Laboratory Data:  Labs have been reviewed.    Lab Results   Component Value Date    WBC 2.45 (L)  "06/23/2025    RBC 4.25 (L) 06/23/2025    HGB 10.3 (L) 06/23/2025    HCT 35.6 (L) 06/23/2025    MCV 84 06/23/2025    MCH 24.2 (L) 06/23/2025    MCHC 28.9 (L) 06/23/2025    RDW 21.0 (H) 06/23/2025     06/23/2025    MPV  06/23/2025      Comment:      Result Not Available    GRAN 1.0 05/30/2025    LYMPH 26.9 06/23/2025    LYMPH 0.66 (L) 06/23/2025    MONO 16.3 (H) 06/23/2025    MONO 0.40 06/23/2025    EOS 2.9 06/23/2025    EOS 0.07 06/23/2025    BASO 0.01 07/11/2016    EOSINOPHIL 1.0 05/30/2025    BASOPHIL 0.4 06/23/2025    BASOPHIL 0.01 06/23/2025     No results found for: "UIBC", "IRON", "TRANS", "TRANSFERRIN", "TIBC", "LABIRON", "FESATURATED"   Lab Results   Component Value Date    FERRITIN 48.0 06/23/2025     CMP  Sodium   Date Value Ref Range Status   06/23/2025 143 136 - 145 mmol/L Final   10/09/2023 143 135 - 146 mmol/L Final   07/11/2016 137 136 - 145 mmol/L Final     Potassium   Date Value Ref Range Status   06/23/2025 4.2 3.5 - 5.1 mmol/L Final   10/09/2023 4.2 3.6 - 5.2 mmol/L Final   07/11/2016 3.6 3.5 - 5.1 mmol/L Final     Chloride   Date Value Ref Range Status   06/23/2025 110 95 - 110 mmol/L Final   07/11/2016 97 95 - 110 mmol/L Final     CO2   Date Value Ref Range Status   06/23/2025 26 23 - 29 mmol/L Final   07/11/2016 30 (H) 23 - 29 mmol/L Final     Carbon Dioxide   Date Value Ref Range Status   10/09/2023 25 24 - 32 mmol/L Final     Glucose   Date Value Ref Range Status   06/23/2025 159 (H) 70 - 110 mg/dL Final   07/11/2016 211 (H) 70 - 110 mg/dL Final     BUN   Date Value Ref Range Status   06/23/2025 19 8 - 23 mg/dL Final     Creatinine   Date Value Ref Range Status   06/23/2025 2.1 (H) 0.5 - 1.4 mg/dL Final     Calcium   Date Value Ref Range Status   06/23/2025 9.7 8.7 - 10.5 mg/dL Final   10/09/2023 10.1 8.4 - 10.3 mg/dL Final   07/11/2016 9.1 8.7 - 10.5 mg/dL Final     Protein Total   Date Value Ref Range Status   06/23/2025 8.3 6.0 - 8.4 gm/dL Final     Total Protein   Date Value Ref " Range Status   07/11/2016 7.8 6.0 - 8.4 g/dL Final     Albumin   Date Value Ref Range Status   06/23/2025 3.7 3.5 - 5.2 g/dL Final   09/11/2023 3.3 (L) 3.4 - 5.0 g/dL Final   07/11/2016 4.1 3.5 - 5.2 g/dL Final     Total Bilirubin   Date Value Ref Range Status   09/11/2023 0.9 <1.3 mg/dL Final   07/11/2016 1.0 0.1 - 1.0 mg/dL Final     Comment:     For infants and newborns, interpretation of results should be based  on gestational age, weight and in agreement with clinical  observations.  Premature Infant recommended reference ranges:  Up to 24 hours.............<8.0 mg/dL  Up to 48 hours............<12.0 mg/dL  3-5 days..................<15.0 mg/dL  6-29 days.................<15.0 mg/dL       Bilirubin Total   Date Value Ref Range Status   06/23/2025 0.6 0.1 - 1.0 mg/dL Final     Comment:     For infants and newborns, interpretation of results should be based   on gestational age, weight and in agreement with clinical   observations.    Premature Infant recommended reference ranges:   0-24 hours:  <8.0 mg/dL   24-48 hours: <12.0 mg/dL   3-5 days:    <15.0 mg/dL   6-29 days:   <15.0 mg/dL     Alkaline Phosphatase   Date Value Ref Range Status   07/11/2016 62 38 - 126 U/L Final     ALP   Date Value Ref Range Status   06/23/2025 63 40 - 150 unit/L Final     AST   Date Value Ref Range Status   06/23/2025 37 11 - 45 unit/L Final   09/11/2023 36 <45 U/L Final   07/11/2016 28 15 - 46 U/L Final     ALT   Date Value Ref Range Status   06/23/2025 24 10 - 44 unit/L Final   09/11/2023 54 (H) <46 U/L Final   07/11/2016 24 10 - 44 U/L Final     Anion Gap   Date Value Ref Range Status   06/23/2025 7 (L) 8 - 16 mmol/L Final     eGFR   Date Value Ref Range Status   06/23/2025 32 (L) >60 mL/min/1.73/m2 Final     Comment:     Estimated GFR calculated using the CKD-EPI creatinine (2021) equation.     Lab Results   Component Value Date    CEA 3.9 06/23/2025     AFP - tumor marker on 06/23/2025  Component  Ref Range & Units (hover) 8 d  ago   AFP <2.0     Pathology:   Specimen to Pathology Gastrointestinal tract on 05/30/2025  Final Diagnosis   Fragments of tubular adenoma with at least high-grade dysplasia/intramucosal carcinoma, focally suspicious for invasion.  Note:  In the context of a mass seen on endoscopy, a more advanced unsampled component of this lesion can not be excluded.  Advise further evaluation as indicated.  This case was reviewed by  who concurs with the diagnosis.     Imaging:    CT Abdomen Pelvis  Without Contrast on 05/29/2025  Impression:  Cirrhosis and mild splenomegaly.     Postoperative changes from left partial hepatectomy for HCC.  No new liver lesions, noting that evaluation is limited without contrast.     No lymphadenopathy.     Anemia, but no identifiable source.     Other findings as described.    CT Abdomen Pelvis  Without Contrast on 06/17/2025  FINDINGS:  Images of the lower thorax are remarkable for bilateral dependent atelectasis noting there may be trace right pleural effusion.     The adrenal glands and gallbladder are grossly unremarkable.  Patient has history of partial hepatectomy in the setting of cirrhosis.  The pancreas is grossly unremarkable.  The spleen is prominent.  The stomach is relatively decompressed without wall thickening.  No significant abdominal lymphadenopathy.     There is no hydronephrosis or nephrolithiasis.  There is a subcentimeter low attenuating lesion within the interpolar region of the right kidney, too small for characterization.  There is bilateral perinephric fat stranding, left greater than right.  The bilateral ureters are grossly unremarkable without calculi seen.  The urinary bladder is mildly prominent noting there may be some wall thickening.  There is questionable surgical change of TURP procedure.     The large bowel is grossly unremarkable.  The terminal ileum is unremarkable.  The appendix is unremarkable.  The small bowel is grossly unremarkable.  There  are a few scattered shotty periaortic, pericaval, and mesenteric lymph nodes.  There is atherosclerotic calcification of the aorta and its branches.  There is surgical change of right common femoral/superficial femoral bypass, partially visualized.     There is osteopenia.  There are degenerative changes of the bilateral sacroiliac joints, pubic symphysis, and spine.  There is a stable sclerotic focus within the right iliac wing.  No significant inguinal lymphadenopathy.     Impression:  1. There is bilateral perinephric fat stranding however greater on the left.  Sequela of recently passed calculus or infection could present in this fashion, correlation with urinalysis advised.  2. Urinary bladder wall thickening, superimposed cystitis not excluded.  3. Please see above for several additional findings.    Procedures:   Colonoscopy on 05/30/2025   Impression:              - Preparation of the colon was poor.   - Malignant tumor at the hepatic flexure. Biopsied. Tattooed. (location suspected hepatic flexure, given looping, tattoo was placed just distally to ensure marking)   - Multiple small polyps in the sigmoid colon, in the transverse colon and in the ascending colon. Resection not attempted.   - The examination was otherwise normal on direct and retroflexion views.     Recommendation:          - Return patient to hospital dai for possible discharge same day.   - Advance diet as tolerated.   - Continue present medications.   - Await pathology results.   - Repeat colonoscopy in 1 year for surveillance.   - Refer to colorectal surgeon. Obtain staging CT with IV contrast, chest abd pelvis. Refer to oncologist.     Assessment:       1. Malignant neoplasm of hepatic flexure    2. Mass of hepatic flexure of colon    3. Colon adenocarcinoma    4. History of hepatocellular carcinoma    5. Other specified counseling    6. Iron deficiency anemia due to chronic blood loss    7. History of iron deficiency anemia    8.  Vitamin B12 deficiency    9. Lung nodule seen on imaging study    10. Chronic renal impairment, stage 3b    11. Chronic leukopenia      Mass of hepatic flexure of colon: PATH: Fragments of tubular adenoma with at least high-grade dysplasia/intramucosal carcinoma, focally suspicious for invasion.  - Discussed history of hepatocellular carcinoma s/p resection in 09/2023 with patient. Discussed need for further imaging and lab studies for current diagnosis. Explained how if the cancer of the colon has metastasized, then chemotherapy/perhaps immuno tx will be needed. Counseled on seeing colo-rectal surgeon to discuss surgery to remove cancer pending staging. Patient acknowledged understanding.  - Patient has an appointment with colo-rectal surgeon Dr. Lamonte Calderon on 07/08/2025.  - PET Scan scheduled on 7/9/2025  --AFP AND CEA NORMAL  -mild abd cramping stable, no wt loss, no n/v diarrhea or constipation    History of hepatocellular carcinoma:  - Per heme/onc note by Dr. Junior Méndez on 10/09/2023:  As part of workup for a lung nodule the patient underwent a PET scan May 11, 2023 and was found to have a hypermetabolic mass in segment 4B the liver measuring 1.8 cm with an SUV of 7. MRI of the liver on July 3, 2023 showed a 1.8 x 1.7 cm enhancing mass of the left lobe of the liver without washout. It does not appear that an alpha fetoprotein was sent. The patient then underwent a resection of tumor on September 5, 2023. 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     Iron deficiency anemia: 7/2/25--rec iv iron--stop oral iron--see orders  - Patient is currently on oral iron supplementation. However, recent CBC shows Hgb has decreased. Instructed patient to stop oral iron supplements.  - Discussed starting IV iron infusions. Discussed risks and benefits of IV  iron infusion treatments. Discussed receiving IV iron infusions in 5 doses over 2 weeks. Patient acknowledges understanding and wishes to proceed with treatment. Informed patient that a nurse will be in contact once insurance has approved infusions.    Vitamin B12 deficiency (diagnosed 2 years ago):  - On monthly B12 injections -lab pending, errantly not obtained as ordered last visit    Chronic abdominal pain:  - Patient reports that he has had this pain for years since his hepatocellular ca surgery--does not limit him-feels tight a bit, and it is unchanged and he has no new pain.  - He also notes that he was prescribed pain medication after his recent colonoscopy-bottle reviewed--1/2 tablet prn pain--6 tablets. Explained to patient that pain medication is often given for postoperative pain, but this medication is not continued unless indicated. Instructed patient to follow up with prescribing doctor if he feels that he needs a refill of his pain medication. Patient acknowledged understanding.    Chronic renal insufficiency - stable, eGFR 32  - Managed by participating clinician    Chronic leukopenia without neutropenia  - Likely benign     Plan:     Today I counseled pt about the following:  MEDICATION INSTRUCTIONS and TODAYS PLANS: FOLLOW UP, REFERRALS, TREATMENT DETAILS   Patient exhibits understanding of all counseling today.    RTC in the afternoon after PET Scan completed on 07/09/2025    I spent a total of  31 minutes in a combination of focused physical exam and history, reviewing outside records, reviewing any available radiographs, counseling, communicating with other health care professionals regarding this patients medical condition, independently interpreting results, developing diagnostic and treatment plan and documenting in the EMR. Pre chart, orders, counseling pain management, NH colon cancer, lung nodule sig etc, role of tumor markers. Entered IV iron regimen, counseled--had to rev surg and PET  appts--he was not aware and would have missed, etc--delmis w nurse for written info    Participating Clinicians:  PCP-see story board    Disclaimer: This note was prepared using voice recognition system and is likely to have sound alike errors.  Please interpret accordingly.    Therese Mathews M.D.  Hematology/Oncology  [facility] [address], [address]  [place] [address]  Phone: [phone]  Fax: [phone]         [1]   Current Outpatient Medications   Medication Sig Dispense Refill    acetaminophen (TYLENOL) 500 MG tablet Take 1,000 mg by mouth every 8 (eight) hours as needed for Pain.      albuterol (PROVENTIL/VENTOLIN HFA) 90 mcg/actuation inhaler Inhale 2 puffs into the lungs every 4 (four) hours as needed for Shortness of Breath or Wheezing.      [Paused] amLODIPine (NORVASC) 10 MG tablet Take 1 tablet (10 mg total) by mouth once daily. 90 tablet 3    apixaban (ELIQUIS) 2.5 mg Tab Take 1 tablet (2.5 mg total) by mouth 2 (two) times daily. 30 tablet 0    atorvastatin (LIPITOR) 20 MG tablet Take 20 mg by mouth once daily.      cyanocobalamin, vitamin B-12, 1,000 mcg/mL Kit Inject 1,000 mcg as directed every 30 days.      ferrous gluconate 324 mg (37.5 mg iron) Tab tablet Take 324 mg by mouth 2 (two) times daily.      glipiZIDE (GLUCOTROL) 10 MG TR24 Take 10 mg by mouth daily with breakfast.      HYDROcodone-acetaminophen (NORCO) 7.5-325 mg per tablet Take 0.5 tablets by mouth nightly as needed for Pain. 6 tablet 0    hydrOXYzine HCL (ATARAX) 10 MG Tab Take 10 mg by mouth 3 (three) times daily as needed.      K-PHOS-NEUTRAL 250 mg tablet Take 1 tablet by mouth 2 (two) times daily.      multivitamin (THERAGRAN) per tablet Take 1 tablet by mouth once daily.      nitroGLYCERIN (NITROSTAT) 0.4 MG SL tablet Place 0.4 mg under the tongue every 5 (five) minutes as needed for Chest pain.      pantoprazole (PROTONIX) 40 MG tablet Take 40 mg by mouth once daily.      polyethylene  glycol (GLYCOLAX) 17 gram/dose powder Take 17 g by mouth daily as needed for Constipation.       No current facility-administered medications for this visit.

## 2025-07-02 ENCOUNTER — OFFICE VISIT (OUTPATIENT)
Dept: HEMATOLOGY/ONCOLOGY | Facility: CLINIC | Age: 75
End: 2025-07-02
Payer: COMMERCIAL

## 2025-07-02 VITALS
DIASTOLIC BLOOD PRESSURE: 59 MMHG | HEART RATE: 71 BPM | OXYGEN SATURATION: 98 % | TEMPERATURE: 98 F | HEIGHT: 68 IN | BODY MASS INDEX: 25.03 KG/M2 | SYSTOLIC BLOOD PRESSURE: 141 MMHG | WEIGHT: 165.13 LBS

## 2025-07-02 DIAGNOSIS — R91.1 LUNG NODULE SEEN ON IMAGING STUDY: ICD-10-CM

## 2025-07-02 DIAGNOSIS — E53.8 VITAMIN B12 DEFICIENCY: ICD-10-CM

## 2025-07-02 DIAGNOSIS — D72.819 CHRONIC LEUKOPENIA: ICD-10-CM

## 2025-07-02 DIAGNOSIS — C18.9 COLON ADENOCARCINOMA: ICD-10-CM

## 2025-07-02 DIAGNOSIS — N18.32 CHRONIC RENAL IMPAIRMENT, STAGE 3B: ICD-10-CM

## 2025-07-02 DIAGNOSIS — K63.89 MASS OF HEPATIC FLEXURE OF COLON: ICD-10-CM

## 2025-07-02 DIAGNOSIS — Z71.89 OTHER SPECIFIED COUNSELING: ICD-10-CM

## 2025-07-02 DIAGNOSIS — Z85.05 HISTORY OF HEPATOCELLULAR CARCINOMA: ICD-10-CM

## 2025-07-02 DIAGNOSIS — Z86.2 HISTORY OF IRON DEFICIENCY ANEMIA: ICD-10-CM

## 2025-07-02 DIAGNOSIS — C18.3 MALIGNANT NEOPLASM OF HEPATIC FLEXURE: Primary | ICD-10-CM

## 2025-07-02 DIAGNOSIS — D50.0 IRON DEFICIENCY ANEMIA DUE TO CHRONIC BLOOD LOSS: ICD-10-CM

## 2025-07-02 PROCEDURE — 99999 PR PBB SHADOW E&M-EST. PATIENT-LVL III: CPT | Mod: PBBFAC,,, | Performed by: INTERNAL MEDICINE

## 2025-07-02 RX ORDER — SODIUM CHLORIDE 0.9 % (FLUSH) 0.9 %
10 SYRINGE (ML) INJECTION
OUTPATIENT
Start: 2025-07-09

## 2025-07-02 RX ORDER — EPINEPHRINE 0.3 MG/.3ML
0.3 INJECTION SUBCUTANEOUS ONCE AS NEEDED
OUTPATIENT
Start: 2025-07-09

## 2025-07-02 RX ORDER — HEPARIN 100 UNIT/ML
500 SYRINGE INTRAVENOUS
OUTPATIENT
Start: 2025-07-09

## 2025-07-07 NOTE — PROGRESS NOTES
CRS Office Visit History and Physical    Referring Md:   Korey Bettencourt Md  [address] [facility]  [address]  [place] [id number]    SUBJECTIVE:     Chief Complaint:  Colon cancer    History of Present Illness:  The patient is a new patient to this practice.   Course is as follows:  Patient is a 74 y.o. male presents with colon cancer  05/30/2025: Colonoscopy performed secondary to iron-deficiency anemia.   - found a large malignant tumor in the hepatic flexure with tattoo placed just distal.    Pathology: Fragments of tubular adenoma with at least high-grade dysplasia/intramucosal carcinoma, focally suspicious for invasion.   Staging:   CT abdomen pelvis 05/29/2025:  - Cirrhosis and mild splenomegaly.    - Postoperative changes from left partial hepatectomy for HCC  CT chest 04/21/2025: Stable appearance and size of slight lobular juxtapleural left lung nodule measuring 11 mm. No new suspicious pulmonary nodule or mass.   CEA = 3.9 (6/23/25)    History of vascular disease status post vascular bypass.  History of hepatocellular carcinoma status post resection in 2023. past medical history of CAD, HTN, Pafib, CVA 2015, cirrhosis, hepatocellular carcinoma Tx with resection, and PAD (Femoral artery stenting on the right January 5, 2022). He is on Eliquis for his Afib.     07/08/2025: Presents for evaluation.  Functionally, he is independent.  He lives with his brother.  He is able to walk a block without chest pain but does have some dyspnea on exertion that resolves with taking intermittent breaks.  No prior heart attack or stroke.  His weight has been stable.  Nonsmoker.  He normally has daily bowel movements without fecal incontinence.  Past abdominal surgery of a open liver resection with subsequent keloid formation.  On Eliquis secondary to peripheral vascular disease.    Last Colonoscopy:  05/30/2025:  Impression:            - Preparation of the colon was poor.                          - Malignant tumor at the  hepatic flexure.                          Biopsied. Tattooed. (location suspected hepatic                          flexure, given looping, tattoo was placed just                          distally to ensure marking)                          - Multiple small polyps in the sigmoid colon, in                          the transverse colon and in the ascending colon.                          Resection not attempted.                          - The examination was otherwise normal on direct                          and retroflexion views.     Review of patient's allergies indicates:   Allergen Reactions    Iodine and iodide containing products        Past Medical History:   Diagnosis Date    Anticoagulant long-term use     Coronary artery disease     Hypertension      Past Surgical History:   Procedure Laterality Date    COLONOSCOPY N/A 5/30/2025    Procedure: COLONOSCOPY;  Surgeon: Korey Bettencourt MD;  Location: [facility] ENDO;  Service: Endoscopy;  Laterality: N/A;    ESOPHAGOGASTRODUODENOSCOPY N/A 4/28/2025    Procedure: EGD (ESOPHAGOGASTRODUODENOSCOPY);  Surgeon: Korey Bettencourt MD;  Location: [facility] ENDO;  Service: Endoscopy;  Laterality: N/A;    VASCULAR SURGERY      stent to left leg artery-per pt report     No family history on file.  Social History[1]     Review of Systems:  Review of Systems   Constitutional:  Positive for malaise/fatigue. Negative for chills, diaphoresis, fever and weight loss.   HENT:  Negative for congestion.    Respiratory:  Positive for shortness of breath.    Cardiovascular:  Negative for chest pain and leg swelling.   Gastrointestinal:  Negative for abdominal pain, blood in stool, constipation, nausea and vomiting.   Genitourinary:  Negative for dysuria.   Musculoskeletal:  Negative for back pain and myalgias.   Skin:  Negative for rash.   Neurological:  Negative for dizziness and weakness.   Endo/Heme/Allergies:  Bruises/bleeds easily.   Psychiatric/Behavioral:  Negative for depression.   "      OBJECTIVE:     Vital Signs (Most Recent)  BP (!) 172/76 (BP Location: Left arm, Patient Position: Sitting)   Pulse 74   Ht 5' 8" (1.727 m)   Wt 74.6 kg (164 lb 7.4 oz)   BMI 25.01 kg/m²     Physical Exam:  General: Black or  male in no distress   Neuro: alert and oriented x 4.  Moves all extremities.     HEENT: no icterus.  Trachea midline  Respiratory: respirations are even and unlabored  Cardiac: regular rate  Abdomen:  Upper midline incision with keloid formation.  No hernia.  Extremities: Warm dry and intact  Skin: no rashes  Anorectal:  Deferred    Labs:  H&H 10 and 36.  Creatinine 2.1.  Albumin 3.7.    Imaging:  CT abdomen pelvis personally reviewed as above.  No obvious tumor or lesions seen within the colon      ASSESSMENT/PLAN:     Oneil was seen today for colon cancer and abdominal pain.    Diagnoses and all orders for this visit:    Colon adenocarcinoma  -     Ambulatory referral/consult to Colorectal Surgery  -     Case Request Operating Room: COLECTOMY, RIGHT, LAPAROSCOPIC, ERAS high        74 y.o. male with past medical history of CAD, HTN, Pafib, CVA 2015, cirrhosis, hepatocellular carcinoma Tx with resection, and PAD (Femoral artery stenting on the right January 5, 2022). He is on Eliquis for his Afib.     He presents with a anemia and workup consistent with colon cancer.  Biopsies with high-grade dysplasia likely sampling air.  No sign of metastatic disease based on his cross-sectional imaging.  Discussed options today of resection versus observation.  Discussed that with observation, the tumor is likely to increase in size and may result in obstruction or metastasis.  Discussed the risks of resection to include anastomotic leak, bleeding, need to convert to an open incision, pain, 3-4 day hospital stay, 4-6 week total recovery.  He wishes to proceed with resection.  Consents were signed today and all questions answered.    Due to his increased risk secondary to comorbid " "conditions, anesthesia clearance has been requested.    RINA Calderon MD, FACS, FASCRS  Staff Surgeon  Colon & Rectal Surgery           [1]   Social History  Tobacco Use    Smoking status: Former     Current packs/day: 0.00     Average packs/day: 1 pack/day for 52.0 years (52.0 ttl pk-yrs)     Types: Cigarettes     Start date:      Quit date: 2016     Years since quittin.5    Smokeless tobacco: Never    Tobacco comments:     Pt is a former 1 pk/day cigarette smoker ("on and off") x 52 yrs. He states that he quit smoking in 2016 and has not relapsed. EMR updated to reflect "Former Smoker" status.   Substance Use Topics    Alcohol use: No    Drug use: Yes     Comment: heroin-snort     "

## 2025-07-07 NOTE — H&P (VIEW-ONLY)
CRS Office Visit History and Physical    Referring Md:   Korey Bettencourt Md  [address] [facility]  [address]  [place] [id number]    SUBJECTIVE:     Chief Complaint:  Colon cancer    History of Present Illness:  The patient is a new patient to this practice.   Course is as follows:  Patient is a 74 y.o. male presents with colon cancer  05/30/2025: Colonoscopy performed secondary to iron-deficiency anemia.   - found a large malignant tumor in the hepatic flexure with tattoo placed just distal.    Pathology: Fragments of tubular adenoma with at least high-grade dysplasia/intramucosal carcinoma, focally suspicious for invasion.   Staging:   CT abdomen pelvis 05/29/2025:  - Cirrhosis and mild splenomegaly.    - Postoperative changes from left partial hepatectomy for HCC  CT chest 04/21/2025: Stable appearance and size of slight lobular juxtapleural left lung nodule measuring 11 mm. No new suspicious pulmonary nodule or mass.   CEA = 3.9 (6/23/25)    History of vascular disease status post vascular bypass.  History of hepatocellular carcinoma status post resection in 2023. past medical history of CAD, HTN, Pafib, CVA 2015, cirrhosis, hepatocellular carcinoma Tx with resection, and PAD (Femoral artery stenting on the right January 5, 2022). He is on Eliquis for his Afib.     07/08/2025: Presents for evaluation.  Functionally, he is independent.  He lives with his brother.  He is able to walk a block without chest pain but does have some dyspnea on exertion that resolves with taking intermittent breaks.  No prior heart attack or stroke.  His weight has been stable.  Nonsmoker.  He normally has daily bowel movements without fecal incontinence.  Past abdominal surgery of a open liver resection with subsequent keloid formation.  On Eliquis secondary to peripheral vascular disease.    Last Colonoscopy:  05/30/2025:  Impression:            - Preparation of the colon was poor.                          - Malignant tumor at the  hepatic flexure.                          Biopsied. Tattooed. (location suspected hepatic                          flexure, given looping, tattoo was placed just                          distally to ensure marking)                          - Multiple small polyps in the sigmoid colon, in                          the transverse colon and in the ascending colon.                          Resection not attempted.                          - The examination was otherwise normal on direct                          and retroflexion views.     Review of patient's allergies indicates:   Allergen Reactions    Iodine and iodide containing products        Past Medical History:   Diagnosis Date    Anticoagulant long-term use     Coronary artery disease     Hypertension      Past Surgical History:   Procedure Laterality Date    COLONOSCOPY N/A 5/30/2025    Procedure: COLONOSCOPY;  Surgeon: Korey Bettencourt MD;  Location: [facility] ENDO;  Service: Endoscopy;  Laterality: N/A;    ESOPHAGOGASTRODUODENOSCOPY N/A 4/28/2025    Procedure: EGD (ESOPHAGOGASTRODUODENOSCOPY);  Surgeon: Korey Bettencourt MD;  Location: [facility] ENDO;  Service: Endoscopy;  Laterality: N/A;    VASCULAR SURGERY      stent to left leg artery-per pt report     No family history on file.  Social History[1]     Review of Systems:  Review of Systems   Constitutional:  Positive for malaise/fatigue. Negative for chills, diaphoresis, fever and weight loss.   HENT:  Negative for congestion.    Respiratory:  Positive for shortness of breath.    Cardiovascular:  Negative for chest pain and leg swelling.   Gastrointestinal:  Negative for abdominal pain, blood in stool, constipation, nausea and vomiting.   Genitourinary:  Negative for dysuria.   Musculoskeletal:  Negative for back pain and myalgias.   Skin:  Negative for rash.   Neurological:  Negative for dizziness and weakness.   Endo/Heme/Allergies:  Bruises/bleeds easily.   Psychiatric/Behavioral:  Negative for depression.   "      OBJECTIVE:     Vital Signs (Most Recent)  BP (!) 172/76 (BP Location: Left arm, Patient Position: Sitting)   Pulse 74   Ht 5' 8" (1.727 m)   Wt 74.6 kg (164 lb 7.4 oz)   BMI 25.01 kg/m²     Physical Exam:  General: Black or  male in no distress   Neuro: alert and oriented x 4.  Moves all extremities.     HEENT: no icterus.  Trachea midline  Respiratory: respirations are even and unlabored  Cardiac: regular rate  Abdomen:  Upper midline incision with keloid formation.  No hernia.  Extremities: Warm dry and intact  Skin: no rashes  Anorectal:  Deferred    Labs:  H&H 10 and 36.  Creatinine 2.1.  Albumin 3.7.    Imaging:  CT abdomen pelvis personally reviewed as above.  No obvious tumor or lesions seen within the colon      ASSESSMENT/PLAN:     Oneil was seen today for colon cancer and abdominal pain.    Diagnoses and all orders for this visit:    Colon adenocarcinoma  -     Ambulatory referral/consult to Colorectal Surgery  -     Case Request Operating Room: COLECTOMY, RIGHT, LAPAROSCOPIC, ERAS high        74 y.o. male with past medical history of CAD, HTN, Pafib, CVA 2015, cirrhosis, hepatocellular carcinoma Tx with resection, and PAD (Femoral artery stenting on the right January 5, 2022). He is on Eliquis for his Afib.     He presents with a anemia and workup consistent with colon cancer.  Biopsies with high-grade dysplasia likely sampling air.  No sign of metastatic disease based on his cross-sectional imaging.  Discussed options today of resection versus observation.  Discussed that with observation, the tumor is likely to increase in size and may result in obstruction or metastasis.  Discussed the risks of resection to include anastomotic leak, bleeding, need to convert to an open incision, pain, 3-4 day hospital stay, 4-6 week total recovery.  He wishes to proceed with resection.  Consents were signed today and all questions answered.    Due to his increased risk secondary to comorbid " "conditions, anesthesia clearance has been requested.    RINA Calderon MD, FACS, FASCRS  Staff Surgeon  Colon & Rectal Surgery           [1]   Social History  Tobacco Use    Smoking status: Former     Current packs/day: 0.00     Average packs/day: 1 pack/day for 52.0 years (52.0 ttl pk-yrs)     Types: Cigarettes     Start date:      Quit date: 2016     Years since quittin.5    Smokeless tobacco: Never    Tobacco comments:     Pt is a former 1 pk/day cigarette smoker ("on and off") x 52 yrs. He states that he quit smoking in 2016 and has not relapsed. EMR updated to reflect "Former Smoker" status.   Substance Use Topics    Alcohol use: No    Drug use: Yes     Comment: heroin-snort     "

## 2025-07-08 ENCOUNTER — OFFICE VISIT (OUTPATIENT)
Dept: SURGERY | Facility: CLINIC | Age: 75
End: 2025-07-08
Payer: COMMERCIAL

## 2025-07-08 ENCOUNTER — TELEPHONE (OUTPATIENT)
Dept: SURGERY | Facility: CLINIC | Age: 75
End: 2025-07-08
Payer: COMMERCIAL

## 2025-07-08 VITALS
DIASTOLIC BLOOD PRESSURE: 76 MMHG | HEART RATE: 74 BPM | WEIGHT: 164.44 LBS | SYSTOLIC BLOOD PRESSURE: 172 MMHG | BODY MASS INDEX: 24.92 KG/M2 | HEIGHT: 68 IN

## 2025-07-08 DIAGNOSIS — C18.9 COLON ADENOCARCINOMA: Primary | ICD-10-CM

## 2025-07-08 PROCEDURE — 3078F DIAST BP <80 MM HG: CPT | Mod: CPTII,S$GLB,, | Performed by: COLON & RECTAL SURGERY

## 2025-07-08 PROCEDURE — 1159F MED LIST DOCD IN RCRD: CPT | Mod: CPTII,S$GLB,, | Performed by: COLON & RECTAL SURGERY

## 2025-07-08 PROCEDURE — 99205 OFFICE O/P NEW HI 60 MIN: CPT | Mod: S$GLB,,, | Performed by: COLON & RECTAL SURGERY

## 2025-07-08 PROCEDURE — 99999 PR PBB SHADOW E&M-EST. PATIENT-LVL V: CPT | Mod: PBBFAC,,, | Performed by: COLON & RECTAL SURGERY

## 2025-07-08 PROCEDURE — 1101F PT FALLS ASSESS-DOCD LE1/YR: CPT | Mod: CPTII,S$GLB,, | Performed by: COLON & RECTAL SURGERY

## 2025-07-08 PROCEDURE — 3077F SYST BP >= 140 MM HG: CPT | Mod: CPTII,S$GLB,, | Performed by: COLON & RECTAL SURGERY

## 2025-07-08 PROCEDURE — 1125F AMNT PAIN NOTED PAIN PRSNT: CPT | Mod: CPTII,S$GLB,, | Performed by: COLON & RECTAL SURGERY

## 2025-07-08 PROCEDURE — 3288F FALL RISK ASSESSMENT DOCD: CPT | Mod: CPTII,S$GLB,, | Performed by: COLON & RECTAL SURGERY

## 2025-07-08 RX ORDER — METRONIDAZOLE 500 MG/1
500 TABLET ORAL 2 TIMES DAILY
Qty: 2 TABLET | Refills: 0 | Status: SHIPPED | OUTPATIENT
Start: 2025-07-08 | End: 2025-07-09

## 2025-07-08 RX ORDER — CIPROFLOXACIN 500 MG/1
500 TABLET, FILM COATED ORAL 2 TIMES DAILY
Qty: 2 TABLET | Refills: 0 | Status: SHIPPED | OUTPATIENT
Start: 2025-07-08 | End: 2025-07-09

## 2025-07-08 NOTE — TELEPHONE ENCOUNTER
Spoke with patient regarding appointment for today. Patient states that he thought it was at [facility]. Patient states that he is still coming, but will be late.

## 2025-07-10 ENCOUNTER — HOSPITAL ENCOUNTER (OUTPATIENT)
Dept: RADIOLOGY | Facility: HOSPITAL | Age: 75
Discharge: HOME | End: 2025-07-10
Attending: INTERNAL MEDICINE
Payer: COMMERCIAL

## 2025-07-10 DIAGNOSIS — R91.1 LUNG NODULE SEEN ON IMAGING STUDY: ICD-10-CM

## 2025-07-10 DIAGNOSIS — C18.9 COLON ADENOCARCINOMA: ICD-10-CM

## 2025-07-10 DIAGNOSIS — Z85.05 HISTORY OF HEPATOCELLULAR CARCINOMA: ICD-10-CM

## 2025-07-10 LAB — POCT GLUCOSE: 105 MG/DL (ref 70–110)

## 2025-07-10 PROCEDURE — A9552 F18 FDG: HCPCS | Performed by: INTERNAL MEDICINE

## 2025-07-10 PROCEDURE — 78815 PET IMAGE W/CT SKULL-THIGH: CPT | Mod: TC

## 2025-07-10 PROCEDURE — 78815 PET IMAGE W/CT SKULL-THIGH: CPT | Mod: 26,PI,, | Performed by: NUCLEAR MEDICINE

## 2025-07-10 RX ORDER — FLUDEOXYGLUCOSE F18 500 MCI/ML
13.45 INJECTION INTRAVENOUS
Status: COMPLETED | OUTPATIENT
Start: 2025-07-10 | End: 2025-07-10

## 2025-07-10 RX ADMIN — FLUDEOXYGLUCOSE F-18 13.45 MILLICURIE: 500 INJECTION INTRAVENOUS at 08:07

## 2025-07-22 ENCOUNTER — TELEPHONE (OUTPATIENT)
Facility: HOSPITAL | Age: 75
End: 2025-07-22
Payer: COMMERCIAL

## 2025-07-22 PROBLEM — N40.0 BPH (BENIGN PROSTATIC HYPERPLASIA): Status: ACTIVE | Noted: 2022-06-28

## 2025-07-22 PROBLEM — N18.31 STAGE 3A CHRONIC KIDNEY DISEASE: Chronic | Status: ACTIVE | Noted: 2023-07-18

## 2025-07-22 PROBLEM — C18.9 COLON ADENOCARCINOMA: Status: ACTIVE | Noted: 2025-07-22

## 2025-07-22 NOTE — ASSESSMENT & PLAN NOTE
Current labs:  Hgb-13.1      Hct-  42.9; improved.  Followed per Hem-Onc  S/P GI Bleed April 2025  S/P Venofer infusion- next due 7/24    Recommend monitoring during perioperative period.

## 2025-07-22 NOTE — ASSESSMENT & PLAN NOTE
Reports mild pain with walking and right leg N/T  S/P right fem-AK popliteal bypass January 2022; has left SFA stent  Not followed Vascular Clinic ; Cardiology pending.     REKHA results: 7/19/24  Impression    Normal ABIs. Slightly low right toe-brachial index may represent microvascular disease.     Electronically Signed By: Shade Huang MD 7/19/2024 9:53 AM CDT  Narrative    [facility] US NON INVASIVE LEG ARTERIES COMPLETE BILATERAL   (Bilateral lower extremity ankle brachial indices and segmental pressure measurements.)     INDICATION: i73.9 I73.9   Peripheral vascular disease, unspecified (CMS/HCC). Bilateral leg cramping. Hypertension. Previous smoker. Diabetic.     FINDINGS:   Right ankle-brachial index: 1.1 (normal >1.0)   Left ankle-brachial index: 1.1 (normal > 1.0)   Right toe-brachial index: 0.5 (normal > 0.7)   Left toe-brachial index: 0.7  (normal > 0.7)   Bilateral lower extremity segmental pressure measurements reveal incompressible upper thigh arteries.    US BLE 4/27/25  Impression:     No hemodynamically significant stenosis demonstrated in the right or left lower extremity arterial system.     Patent fem-pop bypass on the right.     Patent SFA stent on the left.        Electronically signed by: Rodger Shpiley  Date:                                            04/28/2025  Time:                                           00:22

## 2025-07-22 NOTE — ASSESSMENT & PLAN NOTE
Current BP  uncontrolled today.; 208/81 and repeat 185/75  Taking: amlodipine- did take medication today.  Cardiology appt. pending  Requesting outside PCP note from [facility] for more history    Lifestyle changes to reduce systolic BP:   exercise 30 minutes per day,  5 days per week or 150 minutes weekly; sodium reduction and avoidance of high salt foods such as processed meats, frozen meals and  fast foods.   Keeping a healthy weight/BMI can help with better BP control   I recommend monitoring BP during perioperative period as uncontrolled pain can elevate blood pressure.

## 2025-07-22 NOTE — ASSESSMENT & PLAN NOTE
Not followed per cardiology.   Denies chest pain, paroxysmal nocturnal dyspnea, orthopnea, edema, dizziness, syncope.   Reports CARTER    Echo Saline Bubble? Yes    Interpretation Summary    Left Ventricle: The left ventricle is normal in size. Normal wall thickness. Normal wall motion. There is normal systolic function with a visually estimated ejection fraction of 60 - 65%. Grade I diastolic dysfunction.    Right Ventricle: The right ventricle is normal in size. Systolic function is normal.    Left Atrium: Severely dilated measuring 50 mL/m2 Agitated saline study of the atrial septum is positive, consistent with an intracardiac shunt at the atrial level.    Right Atrium: Right atrium is severely dilated.    Mitral Valve: There is mild regurgitation.    Tricuspid Valve: There is mild regurgitation.    Pulmonary Artery: The estimated pulmonary artery systolic pressure is 32 mmHg.

## 2025-07-22 NOTE — ASSESSMENT & PLAN NOTE
No changes in urine volume.   Most recent GFR:  32  BUN=  19  Cr = 2.1  Recommend to avoid NSAIDs, reduce salt intake, maintain adequate hydration, and exercise.  Not followed per Nephrology; will place referral    Tylenol as needed for pain    I  suggest monitoring renal function, input and output status mendy-operatively. I  suggest avoiding nephrotoxic medication including NSAIDs, COX2 inhibitors, intravenous contrast agent,avoiding hypotension to prevent further renal impairment.

## 2025-07-22 NOTE — ASSESSMENT & PLAN NOTE
Per outside problem list  Reports nocturia  Not followed per Urology    There is an increased risk of post-op urinary retention.

## 2025-07-22 NOTE — ASSESSMENT & PLAN NOTE
EDISON per CT chest 4/21/25  Former Smoker  Not followed per Pulmonology  Denies CP/SOB/congestion/wheezing  Reports cough    CT chest 4/21/25  FINDINGS:  Structures at the base of the neck are unremarkable.  No intrathoracic adenopathy.  No anterior pericardial effusion.  Mild aortic arch and coronary calcific atherosclerosis.  No anterior pericardial effusion.  The trachea is clear.  Incidental mild gynecomastia.     Lungs are symmetrically expanded with mild bibasilar atelectasis or scarring.  Upper lobe predominant emphysema.  Slight lobular left lung juxtapleural nodule measuring 11 mm (series 3, image 61), similar.  Punctate right perifissural left upper lobe nodules, similar.  Partial calcified pleural plaque on the right anteriorly, similar.  No new suspicious focal opacity or mass.  No pleural effusion.     Limited images through the unenhanced upper abdomen demonstrate slight nodular liver surface contour.  Spine DJD.  Stable sclerotic focus at the left posterior 8th rib.  No aggressive osseous lesion.     Impression:     Stable appearance and size of slight lobular juxtapleural left lung nodule measuring 11 mm.  No new suspicious pulmonary nodule or mass.     Pulmonary emphysema, cirrhotic appearing liver, and additional findings as above.        Electronically signed by: Alex Harper  Date:                                            04/21/2025  Time:                                           18:25

## 2025-07-22 NOTE — ASSESSMENT & PLAN NOTE
S/P liver resection 9/5/23; denies radiation and chemo  Lost to follow-up due to incarceration  Needs follow-up with Hepatology; referral placed.  Cirrhosis noted on CT abdomen/pelvis 6/17/25; patient denies diagnosis  Spleen prominent  Does not drink alcohol  INR: 1.1  Platelet count: 121- low  LFTs normal    Please be aware that surgery can predispose the patient to hepatic decompensation.  I suggest avoidance of Hypotension in the perioperative period that might precipitate hepatic decompensation.I suggest avoidance of excessive IV fluids that might contribute to ascites.  Please monitor Liver,renal function ,INR in the perioperative period.    MELD Score:15      VOCAL- JAMES SCORE  Predicted Postoperative Outcomes by the VOCAL-[place] Score:      30-day mortality: 0.5%      90-day mortality: 1.4%      180-day mortality: 3.8%      90-day decompensation: 7.5%

## 2025-07-22 NOTE — ASSESSMENT & PLAN NOTE
Treated with Eliquis; awaiting instructions from Cardiology  Not followed per Cardiology; has appt. on 7/24 @ 1100  Denies bleeding issues.

## 2025-07-22 NOTE — ASSESSMENT & PLAN NOTE
PASP= 32 mmHg per TTE 4/28/25    I recommend to avoid intravascular volume overload or reduce pre load

## 2025-07-23 ENCOUNTER — OFFICE VISIT (OUTPATIENT)
Facility: CLINIC | Age: 75
End: 2025-07-23
Payer: COMMERCIAL

## 2025-07-23 ENCOUNTER — LAB VISIT (OUTPATIENT)
Facility: HOSPITAL | Age: 75
End: 2025-07-23
Payer: COMMERCIAL

## 2025-07-23 VITALS
SYSTOLIC BLOOD PRESSURE: 185 MMHG | HEIGHT: 68 IN | RESPIRATION RATE: 16 BRPM | TEMPERATURE: 98 F | DIASTOLIC BLOOD PRESSURE: 75 MMHG | BODY MASS INDEX: 25.01 KG/M2 | WEIGHT: 165 LBS | HEART RATE: 70 BPM | OXYGEN SATURATION: 97 %

## 2025-07-23 DIAGNOSIS — R91.1 PULMONARY NODULE: Chronic | ICD-10-CM

## 2025-07-23 DIAGNOSIS — D50.0 IRON DEFICIENCY ANEMIA DUE TO CHRONIC BLOOD LOSS: ICD-10-CM

## 2025-07-23 DIAGNOSIS — I73.9 PAD (PERIPHERAL ARTERY DISEASE): ICD-10-CM

## 2025-07-23 DIAGNOSIS — I50.32 CHRONIC DIASTOLIC CONGESTIVE HEART FAILURE: ICD-10-CM

## 2025-07-23 DIAGNOSIS — E11.9 TYPE 2 DIABETES MELLITUS WITHOUT COMPLICATION, WITHOUT LONG-TERM CURRENT USE OF INSULIN: ICD-10-CM

## 2025-07-23 DIAGNOSIS — Z01.818 PREOPERATIVE EXAMINATION: Primary | ICD-10-CM

## 2025-07-23 DIAGNOSIS — N18.32 CKD STAGE 3B, GFR 30-44 ML/MIN: ICD-10-CM

## 2025-07-23 DIAGNOSIS — C18.9 COLON ADENOCARCINOMA: ICD-10-CM

## 2025-07-23 DIAGNOSIS — N40.0 BENIGN PROSTATIC HYPERPLASIA, UNSPECIFIED WHETHER LOWER URINARY TRACT SYMPTOMS PRESENT: ICD-10-CM

## 2025-07-23 DIAGNOSIS — I48.91 ATRIAL FIBRILLATION, UNSPECIFIED TYPE: Chronic | ICD-10-CM

## 2025-07-23 DIAGNOSIS — I27.20 PULMONARY HYPERTENSION: ICD-10-CM

## 2025-07-23 DIAGNOSIS — I10 HYPERTENSION, UNSPECIFIED TYPE: ICD-10-CM

## 2025-07-23 DIAGNOSIS — Z85.05: ICD-10-CM

## 2025-07-23 LAB
ABSOLUTE EOSINOPHIL (OHS): 0.04 K/UL
ABSOLUTE MONOCYTE (OHS): 0.3 K/UL (ref 0.3–1)
ABSOLUTE NEUTROPHIL COUNT (OHS): 1.23 K/UL (ref 1.8–7.7)
BASOPHILS # BLD AUTO: 0.01 K/UL
BASOPHILS NFR BLD AUTO: 0.4 %
EAG (OHS): 117 MG/DL (ref 68–131)
ERYTHROCYTE [DISTWIDTH] IN BLOOD BY AUTOMATED COUNT: 19.8 % (ref 11.5–14.5)
HBA1C MFR BLD: 5.7 % (ref 4–5.6)
HCT VFR BLD AUTO: 42.9 % (ref 40–54)
HGB BLD-MCNC: 13.1 GM/DL (ref 14–18)
IMM GRANULOCYTES # BLD AUTO: 0.01 K/UL (ref 0–0.04)
IMM GRANULOCYTES NFR BLD AUTO: 0.4 % (ref 0–0.5)
LYMPHOCYTES # BLD AUTO: 0.92 K/UL (ref 1–4.8)
MCH RBC QN AUTO: 26.2 PG (ref 27–31)
MCHC RBC AUTO-ENTMCNC: 30.5 G/DL (ref 32–36)
MCV RBC AUTO: 86 FL (ref 82–98)
NUCLEATED RBC (/100WBC) (OHS): 0 /100 WBC
PLATELET # BLD AUTO: 121 K/UL (ref 150–450)
PMV BLD AUTO: ABNORMAL FL
RBC # BLD AUTO: 5 M/UL (ref 4.6–6.2)
RELATIVE EOSINOPHIL (OHS): 1.6 %
RELATIVE LYMPHOCYTE (OHS): 36.7 % (ref 18–48)
RELATIVE MONOCYTE (OHS): 12 % (ref 4–15)
RELATIVE NEUTROPHIL (OHS): 48.9 % (ref 38–73)
WBC # BLD AUTO: 2.51 K/UL (ref 3.9–12.7)

## 2025-07-23 PROCEDURE — 83036 HEMOGLOBIN GLYCOSYLATED A1C: CPT

## 2025-07-23 PROCEDURE — 99205 OFFICE O/P NEW HI 60 MIN: CPT | Mod: S$GLB,,, | Performed by: NURSE PRACTITIONER

## 2025-07-23 PROCEDURE — 1159F MED LIST DOCD IN RCRD: CPT | Mod: CPTII,S$GLB,, | Performed by: NURSE PRACTITIONER

## 2025-07-23 PROCEDURE — 99417 PROLNG OP E/M EACH 15 MIN: CPT | Mod: S$GLB,,, | Performed by: NURSE PRACTITIONER

## 2025-07-23 PROCEDURE — 1101F PT FALLS ASSESS-DOCD LE1/YR: CPT | Mod: CPTII,S$GLB,, | Performed by: NURSE PRACTITIONER

## 2025-07-23 PROCEDURE — 1160F RVW MEDS BY RX/DR IN RCRD: CPT | Mod: CPTII,S$GLB,, | Performed by: NURSE PRACTITIONER

## 2025-07-23 PROCEDURE — 3078F DIAST BP <80 MM HG: CPT | Mod: CPTII,S$GLB,, | Performed by: NURSE PRACTITIONER

## 2025-07-23 PROCEDURE — 3077F SYST BP >= 140 MM HG: CPT | Mod: CPTII,S$GLB,, | Performed by: NURSE PRACTITIONER

## 2025-07-23 PROCEDURE — 99999 PR PBB SHADOW E&M-EST. PATIENT-LVL V: CPT | Mod: PBBFAC,,, | Performed by: NURSE PRACTITIONER

## 2025-07-23 PROCEDURE — 85025 COMPLETE CBC W/AUTO DIFF WBC: CPT

## 2025-07-23 PROCEDURE — 3288F FALL RISK ASSESSMENT DOCD: CPT | Mod: CPTII,S$GLB,, | Performed by: NURSE PRACTITIONER

## 2025-07-23 PROCEDURE — 1126F AMNT PAIN NOTED NONE PRSNT: CPT | Mod: CPTII,S$GLB,, | Performed by: NURSE PRACTITIONER

## 2025-07-23 NOTE — DISCHARGE INSTRUCTIONS
.Your surgery has been scheduled for:__________7/28/25________________________________    You should report to:  ____[facility], located on the [place] side of the first floor of the           [facility] ([phone])  __X__[facility], located on the [facility] side of the            Second floor of the [facility] ([phone])  ____3rd Floor [facility] located on the [facility] side of the [facility] [phone]  ____[facility]: [address] [place] [address]. [address].     Please Note   Tell your doctor if you take Aspirin, products containing Aspirin, herbal medications  or blood thinners, such as Coumadin, Ticlid, or Plavix.  (Consult your provider regarding holding or stopping before surgery).  Arrange for someone to drive you home following surgery.  You will not be allowed to leave the surgical facility alone or drive yourself home following sedation and anesthesia.    Before Surgery  Stop taking all herbal medications, vitamins, and supplements 7 days prior to surgery  No Motrin/Advil (Ibuprofen) 7 days before surgery  No Aleve (Naproxen) 7 days before surgery   No Goody's/BC Powder 7 days before surgery  Refrain from drinking alcoholic beverages for 24 hours before and after surgery  Stop or limit smoking at least 24 hours prior to surgery  You may take Tylenol for pain    Night before Surgery  Do not eat or drink after midnight  Take a shower or bath (shower is recommended).  Bathe with Hibiclens soap or an antibacterial soap from the neck down.  If not supplied by your surgeon, hibiclens soap will need to be purchased over the counter in pharmacy.  Rinse soap off thoroughly.  Shampoo your hair with your regular shampoo    The Day of Surgery  Take another bath or shower with hibiclens or any antibacterial soap, to reduce the chance of infection.  Take heart  and blood pressure medications with a small sip of water, as advised by the perioperative team.  Do not take fluid pills  You may brush your teeth and rinse your mouth, but do not swallow any additional water.   Do not apply perfumes, powder, body lotions or deodorant on the day of surgery.  Nail polish should be removed.  Do not wear makeup or moisturizer  Wear comfortable clothes, such as a button front shirt and loose fitting pants.  Leave all jewelry, including body piercings, and valuables at home.    Bring any devices you will need after surgery such as crutches or canes.  If you have sleep apnea, please bring your CPAP machine  In the event that your physical condition changes including the onset of a cold or respiratory illness, or if you have to delay or cancel your surgery, please notify your surgeon.

## 2025-07-23 NOTE — OUTPATIENT SUBJECTIVE & OBJECTIVE
Outpatient Subjective & Objective      Chief Complaint: Preoperative evaulation, perioperative medical management, and complication reduction plan.     Functional Capacity: cuts grass; walked from parking lot to lobby and clinic without CP/SOB. Does report occasional CARTER.      Anesthesia issues: None    Difficulty mouth opening: No    Steroid use in the last 12 months:  No    Dental Issues: None    Family anesthesia difficulty: None     Family Hx of Thrombosis: None        Past Medical History:   Diagnosis Date    Anticoagulant long-term use     Coronary artery disease     Hypertension          No past medical history pertinent negatives.      Past Surgical History:   Procedure Laterality Date    COLONOSCOPY N/A 5/30/2025    Procedure: COLONOSCOPY;  Surgeon: Korey Bettencourt MD;  Location: [facility] ENDO;  Service: Endoscopy;  Laterality: N/A;    ESOPHAGOGASTRODUODENOSCOPY N/A 4/28/2025    Procedure: EGD (ESOPHAGOGASTRODUODENOSCOPY);  Surgeon: Korey Bettencourt MD;  Location: [facility] ENDO;  Service: Endoscopy;  Laterality: N/A;    VASCULAR SURGERY      stent to left leg artery-per pt report       Review of Systems   Constitutional:  Negative for chills, fatigue, fever and unexpected weight change.   HENT:  Negative for congestion, hearing loss, rhinorrhea, sore throat, tinnitus and trouble swallowing.    Eyes:  Negative for visual disturbance.   Respiratory:  Positive for shortness of breath (occasional CARTER). Negative for cough, chest tightness and wheezing.    Cardiovascular:  Negative for chest pain, palpitations and leg swelling.   Gastrointestinal:  Positive for abdominal pain (occasional lower). Negative for constipation and diarrhea.        Denies Fatty liver, Hepatitis   Genitourinary:  Negative for decreased urine volume, difficulty urinating, dysuria, frequency, hematuria and urgency.        Nocturia   Musculoskeletal:  Negative for arthralgias, back pain, neck pain and neck stiffness.   Neurological:  Positive  "for numbness. Negative for dizziness, syncope, weakness and headaches.   Hematological:  Does not bruise/bleed easily.   Psychiatric/Behavioral:  Negative for sleep disturbance and suicidal ideas.               VITALS  Visit Vitals  BP (!) 185/75 (BP Location: Left arm, Patient Position: Sitting)   Pulse 70   Temp 98.3 °F (36.8 °C) (Oral)   Resp 16   Ht 5' 8" (1.727 m)   Wt 74.8 kg (165 lb)   SpO2 97%   BMI 25.09 kg/m²          Physical Exam  Vitals reviewed.   Constitutional:       General: He is not in acute distress.     Appearance: He is well-developed.   HENT:      Head: Normocephalic.      Nose: Nose normal.      Mouth/Throat:      Pharynx: No oropharyngeal exudate.   Eyes:      General:         Right eye: No discharge.         Left eye: No discharge.      Conjunctiva/sclera: Conjunctivae normal.      Pupils: Pupils are equal, round, and reactive to light.   Neck:      Thyroid: No thyromegaly.      Vascular: No carotid bruit or JVD.      Trachea: No tracheal deviation.   Cardiovascular:      Rate and Rhythm: Normal rate. Rhythm irregularly irregular.      Pulses:           Carotid pulses are 2+ on the right side and 2+ on the left side.       Dorsalis pedis pulses are 1+ on the right side and 1+ on the left side.        Posterior tibial pulses are 1+ on the right side and 1+ on the left side.      Heart sounds: Normal heart sounds. No murmur heard.  Pulmonary:      Effort: Pulmonary effort is normal. No respiratory distress.      Breath sounds: Normal breath sounds. No stridor. No wheezing, rhonchi or rales.   Abdominal:      General: Bowel sounds are normal. There is no distension.      Palpations: Abdomen is soft.      Tenderness: There is no abdominal tenderness. There is no guarding.   Musculoskeletal:      Cervical back: Normal range of motion. No pain with movement.      Right lower leg: No edema.      Left lower leg: No edema.   Lymphadenopathy:      Cervical: No cervical adenopathy.   Skin:     " General: Skin is warm and dry.      Capillary Refill: Capillary refill takes less than 2 seconds.      Findings: No erythema or rash.   Neurological:      Mental Status: He is alert and oriented to person, place, and time.   Psychiatric:         Behavior: Behavior normal. Behavior is cooperative.          Significant Labs:  Lab Results   Component Value Date    WBC 2.51 (L) 07/23/2025    HGB 13.1 (L) 07/23/2025    HCT 42.9 07/23/2025     (L) 07/23/2025    CHOL 163 07/11/2016    TRIG 57 07/11/2016    HDL 65 07/11/2016    ALT 24 06/23/2025    AST 37 06/23/2025     06/23/2025    K 4.2 06/23/2025     06/23/2025    CREATININE 2.1 (H) 06/23/2025    BUN 19 06/23/2025    CO2 26 06/23/2025    PSA 0.08 07/11/2016    INR 1.1 06/17/2025    HGBA1C 5.7 (H) 07/23/2025           EKG:   Results for orders placed or performed during the hospital encounter of 06/17/25   EKG 12-lead    Collection Time: 06/17/25  1:25 PM   Result Value Ref Range    QRS Duration 98 ms    [facility] QTC Calculation 446 ms    Narrative    Test Reason : K92.2,    Vent. Rate :  60 BPM     Atrial Rate : 300 BPM     P-R Int :    ms          QRS Dur :  98 ms      QT Int : 446 ms       P-R-T Axes :     82  66 degrees    QTcB Int : 446 ms    Atrial fibrillation  Nonspecific T wave abnormality  Abnormal ECG  When compared with ECG of 27-Apr-2025 23:53,  No significant change was found  Confirmed by Jozef Flores ([id number]) on 6/17/2025 4:31:25 PM    Referred By: JOANNA PORES           Confirmed By: Jozef Flores         2D ECHO:  TTE:  Results for orders placed or performed during the hospital encounter of 04/27/25   Echo Saline Bubble? Yes   Result Value Ref Range    LA Vol (MOD) 92 mL    LV ESV A2C 92.46 mL    LV EDV A4C 65.80 mL    LA area A4C 25.18 cm2    LA area A2C 24.85 cm2    LV ESV A4C 74.53 mL    LV EDV A2C [id number] mL    Triscuspid Valve Regurgitation Peak Gradient 29 mmHg    RA Vol 73.77 mL    LV Diastolic Volume 92 mL    LV Systolic  Volume 37 mL    MV VTI 34.8 cm    TR Max El 2.7 m/s    MV Peak E El 0.85 m/s    MV peak gradient 6 mmHg    Ao VTI 32.8 cm    Ao peak el 1.6 m/s    LVOT peak VTI 22.3 cm    LVOT peak el 1.0 m/s    LVOT diameter 1.8 cm    E wave deceleration time 141 msec    MV mean gradient 2 mmHg    AV mean gradient 5 mmHg    RA area length vol 70.46 mL    RV- tapia basal diam 3.7 cm    A4C EF 55 %    A2C EF 37 %    RA Area 24.2 cm2    RV S' 9.80 cm/s    TAPSE 1.4 cm    Ascending aorta 2.8 cm    STJ 2.6 cm    Sinus 2.96 cm    LVIDs 3.1 2.1 - 4.0 cm    PW 0.8 0.6 - 1.1 cm    IVS 1.0 0.6 - 1.1 cm    LVIDd 4.5 3.5 - 6.0 cm    PV PEAK VELOCITY 1.02 m/s    TDI LATERAL 0.15 m/s    Pulmonary Valve Mean Velocity 0.68 m/s    Left Ventricular Outflow Tract Mean Gradient 2.14 mmHg    Left Ventricular Outflow Tract Mean Velocity 0.68 cm/s    Left Ventricular End Systolic Volume by Teichholz Method 36.98 mL    Left Ventricular End Diastolic Volume by Teichholz Method 91.88 mL    IVC diameter 1.69 cm    TDI SEPTAL 0.10 m/s    LV LATERAL E/E' RATIO 5.7 m/s    LV SEPTAL E/E' RATIO 8.5 m/s    RV/LV Ratio 0.82 cm    FS 31.1 28 - 44 %    LV mass 132.8 g    ZLVIDD -1.33     ZLVIDS -0.18     Left Ventricle Relative Wall Thickness 0.36 cm    AV valve area 1.7 cm²    AV Velocity Ratio 0.63     AV index (prosthetic) 0.68     MV valve area by continuity eq 1.63 cm2    PV peak gradient 4 mmHg    Mean e' 0.13 m/s    LVOT area 2.5 cm2    LVOT stroke volume 56.7 cm3    AV peak gradient 10 mmHg    E/E' ratio 7 m/s    LV Systolic Volume Index 20.0 mL/m2    LV Diastolic Volume Index 49.73 mL/m2    LV Mass Index 71.8 g/m2    CHUCKY (MOD) 50 mL/m2    LITA by Velocity Ratio 1.6 cm²    BSA 1.85 m2    TV resting pulmonary artery pressure 32 mmHg    RV TB RVSP 6 mmHg    Est. RA pres 3 mmHg    Narrative      Left Ventricle: The left ventricle is normal in size. Normal wall   thickness. Normal wall motion. There is normal systolic function with a   visually estimated  ejection fraction of 60 - 65%. Grade I diastolic   dysfunction.    Right Ventricle: The right ventricle is normal in size. Systolic   function is normal.    Left Atrium: Severely dilated measuring 50 mL/m2 Agitated saline study   of the atrial septum is positive, consistent with an intracardiac shunt at   the atrial level.    Right Atrium: Right atrium is severely dilated.    Mitral Valve: There is mild regurgitation.    Tricuspid Valve: There is mild regurgitation.    Pulmonary Artery: The estimated pulmonary artery systolic pressure is   32 mmHg.             Active Cardiac Conditions: None        Revised Cardiac Risk Index   High -Risk Surgery  Intraperitoneal; Intrathoracic; suprainguinal vascular Yes- + 1 No- 0   History of Ischemic Heart Disease   (Hx of MI/positive exercise test/current chest pain due to ischemia/use of nitrate therapy/EKG with pathological Q waves) Yes- + 1 No- 0   History of CHF  (Pulmonary edema/bilateral rales or S3 gallop/PND/CXR showing pulmonary vascular redistribution) Yes- + 1 No- 0   History of CVA   (Prior stroke or TIA) Yes- + 1 No- 0   Pre-operative treatment with insulin Yes- + 1 No- 0   Pre-operative creatinine > 2mg/dl Yes- + 1 No- 0   Total:2      Risk Status:  Estimated risk of cardiac complications after non-cardiac surgery using the Revised Cardiac Risk Index for Preoperative risk is 10.1 %      ARISCAT (Canet) risk index: Low: 1.6% risk of post-op pulmonary complications; Intermediate: 13.3% risk of post-op pulmonary complications if surgery is > 3 hours.     [employer] of Anesthesiologists Physical Status classification (ASA): 3             Outpatient Subjective & Objective

## 2025-07-23 NOTE — PROGRESS NOTES
Shin-PreOp Consults  Progress Note    Patient Name: Oneil Meza  MRN: [id number]  Date of Evaluation- 07/23/2025  PCP- No primary care provider on file.    Future cases for Oneil Meza [[id number]]       Case ID Status Date Time Juliocesar Procedure Provider Location    [id number] [facility] 7/28/2025 12:10  COLECTOMY, RIGHT, LAPAROSCOPIC, ERAS high RINA Calderon MD [[id number]] [facility] OR 2ND FLR            HPI:  This is a 74 y.o. male  who presents today with friend for a preoperative evaluation in preparation for right laparoscopic colectomy.  Surgery is indicated for colon adenocarcinoma.   Patient is new to me.  The history has been obtained by speaking with the patient and reviewing the electronic medical record and/or outside health information. Significant health conditions for the perioperative period are discussed below in assessment and plan.   Patient reports current health status to be Good.  Denies any new symptoms before surgery.       Subjective/ Objective:     Chief Complaint: Preoperative evaulation, perioperative medical management, and complication reduction plan.     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 Does report occasional CARTER.      Anesthesia issues: None    Difficulty mouth opening: No    Steroid use in the last 12 months:  No    Dental Issues: None    Family anesthesia difficulty: None     Family Hx of Thrombosis: None        Past Medical History:   Diagnosis Date    Anticoagulant long-term use     Coronary artery disease     Hypertension          No past medical history pertinent negatives.      Past Surgical History:   Procedure Laterality Date    COLONOSCOPY N/A 5/30/2025    Procedure: COLONOSCOPY;  Surgeon: Korey Bettencourt MD;  Location: [facility];  Service: Endoscopy;  Laterality: N/A;    ESOPHAGOGASTRODUODENOSCOPY N/A 4/28/2025    Procedure: EGD (ESOPHAGOGASTRODUODENOSCOPY);  Surgeon: Korey Bettencourt MD;  Location: [facility];   "Service: Endoscopy;  Laterality: N/A;    VASCULAR SURGERY      stent to left leg artery-per pt report       Review of Systems   Constitutional:  Negative for chills, fatigue, fever and unexpected weight change.   HENT:  Negative for congestion, hearing loss, rhinorrhea, sore throat, tinnitus and trouble swallowing.    Eyes:  Negative for visual disturbance.   Respiratory:  Positive for shortness of breath (occasional CARTER). Negative for cough, chest tightness and wheezing.    Cardiovascular:  Negative for chest pain, palpitations and leg swelling.   Gastrointestinal:  Positive for abdominal pain (occasional lower). Negative for constipation and diarrhea.        Denies Fatty liver, Hepatitis   Genitourinary:  Negative for decreased urine volume, difficulty urinating, dysuria, frequency, hematuria and urgency.        Nocturia   Musculoskeletal:  Negative for arthralgias, back pain, neck pain and neck stiffness.   Neurological:  Positive for numbness. Negative for dizziness, syncope, weakness and headaches.   Hematological:  Does not bruise/bleed easily.   Psychiatric/Behavioral:  Negative for sleep disturbance and suicidal ideas.               VITALS  Visit Vitals  BP (!) 185/75 (BP Location: Left arm, Patient Position: Sitting)   Pulse 70   Temp 98.3 °F (36.8 °C) (Oral)   Resp 16   Ht 5' 8" (1.727 m)   Wt 74.8 kg (165 lb)   SpO2 97%   BMI 25.09 kg/m²          Physical Exam  Vitals reviewed.   Constitutional:       General: He is not in acute distress.     Appearance: He is well-developed.   HENT:      Head: Normocephalic.      Nose: Nose normal.      Mouth/Throat:      Pharynx: No oropharyngeal exudate.   Eyes:      General:         Right eye: No discharge.         Left eye: No discharge.      Conjunctiva/sclera: Conjunctivae normal.      Pupils: Pupils are equal, round, and reactive to light.   Neck:      Thyroid: No thyromegaly.      Vascular: No carotid bruit or JVD.      Trachea: No tracheal deviation. "   Cardiovascular:      Rate and Rhythm: Normal rate. Rhythm irregularly irregular.      Pulses:           Carotid pulses are 2+ on the right side and 2+ on the left side.       Dorsalis pedis pulses are 1+ on the right side and 1+ on the left side.        Posterior tibial pulses are 1+ on the right side and 1+ on the left side.      Heart sounds: Normal heart sounds. No murmur heard.  Pulmonary:      Effort: Pulmonary effort is normal. No respiratory distress.      Breath sounds: Normal breath sounds. No stridor. No wheezing, rhonchi or rales.   Abdominal:      General: Bowel sounds are normal. There is no distension.      Palpations: Abdomen is soft.      Tenderness: There is no abdominal tenderness. There is no guarding.   Musculoskeletal:      Cervical back: Normal range of motion. No pain with movement.      Right lower leg: No edema.      Left lower leg: No edema.   Lymphadenopathy:      Cervical: No cervical adenopathy.   Skin:     General: Skin is warm and dry.      Capillary Refill: Capillary refill takes less than 2 seconds.      Findings: No erythema or rash.   Neurological:      Mental Status: He is alert and oriented to person, place, and time.   Psychiatric:         Behavior: Behavior normal. Behavior is cooperative.          Significant Labs:  Lab Results   Component Value Date    WBC 2.51 (L) 07/23/2025    HGB 13.1 (L) 07/23/2025    HCT 42.9 07/23/2025     (L) 07/23/2025    CHOL 163 07/11/2016    TRIG 57 07/11/2016    HDL 65 07/11/2016    ALT 24 06/23/2025    AST 37 06/23/2025     06/23/2025    K 4.2 06/23/2025     06/23/2025    CREATININE 2.1 (H) 06/23/2025    BUN 19 06/23/2025    CO2 26 06/23/2025    PSA 0.08 07/11/2016    INR 1.1 06/17/2025    HGBA1C 5.7 (H) 07/23/2025           EKG:   Results for orders placed or performed during the hospital encounter of 06/17/25   EKG 12-lead    Collection Time: 06/17/25  1:25 PM   Result Value Ref Range    QRS Duration 98 ms    [facility]HS QTC  Calculation 446 ms    Narrative    Test Reason : K92.2,    Vent. Rate :  60 BPM     Atrial Rate : 300 BPM     P-R Int :    ms          QRS Dur :  98 ms      QT Int : 446 ms       P-R-T Axes :     82  66 degrees    QTcB Int : 446 ms    Atrial fibrillation  Nonspecific T wave abnormality  Abnormal ECG  When compared with ECG of 27-Apr-2025 23:53,  No significant change was found  Confirmed by Jozef Flores ([id number]) on 6/17/2025 4:31:25 PM    Referred By: JOANNA GREER           Confirmed By: Jozef Flores         2D ECHO:  TTE:  Results for orders placed or performed during the hospital encounter of 04/27/25   Echo Saline Bubble? Yes   Result Value Ref Range    LA Vol (MOD) 92 mL    LV ESV A2C 92.46 mL    LV EDV A4C 65.80 mL    LA area A4C 25.18 cm2    LA area A2C 24.85 cm2    LV ESV A4C 74.53 mL    LV EDV A2C [id number] mL    Triscuspid Valve Regurgitation Peak Gradient 29 mmHg    RA Vol 73.77 mL    LV Diastolic Volume 92 mL    LV Systolic Volume 37 mL    MV VTI 34.8 cm    TR Max El 2.7 m/s    MV Peak E El 0.85 m/s    MV peak gradient 6 mmHg    Ao VTI 32.8 cm    Ao peak el 1.6 m/s    LVOT peak VTI 22.3 cm    LVOT peak el 1.0 m/s    LVOT diameter 1.8 cm    E wave deceleration time 141 msec    MV mean gradient 2 mmHg    AV mean gradient 5 mmHg    RA area length vol 70.46 mL    RV- tapia basal diam 3.7 cm    A4C EF 55 %    A2C EF 37 %    RA Area 24.2 cm2    RV S' 9.80 cm/s    TAPSE 1.4 cm    Ascending aorta 2.8 cm    STJ 2.6 cm    Sinus 2.96 cm    LVIDs 3.1 2.1 - 4.0 cm    PW 0.8 0.6 - 1.1 cm    IVS 1.0 0.6 - 1.1 cm    LVIDd 4.5 3.5 - 6.0 cm    PV PEAK VELOCITY 1.02 m/s    TDI LATERAL 0.15 m/s    Pulmonary Valve Mean Velocity 0.68 m/s    Left Ventricular Outflow Tract Mean Gradient 2.14 mmHg    Left Ventricular Outflow Tract Mean Velocity 0.68 cm/s    Left Ventricular End Systolic Volume by Teichholz Method 36.98 mL    Left Ventricular End Diastolic Volume by Teichholz Method 91.88 mL    IVC diameter 1.69 cm     TDI SEPTAL 0.10 m/s    LV LATERAL E/E' RATIO 5.7 m/s    LV SEPTAL E/E' RATIO 8.5 m/s    RV/LV Ratio 0.82 cm    FS 31.1 28 - 44 %    LV mass 132.8 g    ZLVIDD -1.33     ZLVIDS -0.18     Left Ventricle Relative Wall Thickness 0.36 cm    AV valve area 1.7 cm²    AV Velocity Ratio 0.63     AV index (prosthetic) 0.68     MV valve area by continuity eq 1.63 cm2    PV peak gradient 4 mmHg    Mean e' 0.13 m/s    LVOT area 2.5 cm2    LVOT stroke volume 56.7 cm3    AV peak gradient 10 mmHg    E/E' ratio 7 m/s    LV Systolic Volume Index 20.0 mL/m2    LV Diastolic Volume Index 49.73 mL/m2    LV Mass Index 71.8 g/m2    CHUCKY (MOD) 50 mL/m2    LITA by Velocity Ratio 1.6 cm²    BSA 1.85 m2    TV resting pulmonary artery pressure 32 mmHg    RV TB RVSP 6 mmHg    Est. RA pres 3 mmHg    Narrative      Left Ventricle: The left ventricle is normal in size. Normal wall   thickness. Normal wall motion. There is normal systolic function with a   visually estimated ejection fraction of 60 - 65%. Grade I diastolic   dysfunction.    Right Ventricle: The right ventricle is normal in size. Systolic   function is normal.    Left Atrium: Severely dilated measuring 50 mL/m2 Agitated saline study   of the atrial septum is positive, consistent with an intracardiac shunt at   the atrial level.    Right Atrium: Right atrium is severely dilated.    Mitral Valve: There is mild regurgitation.    Tricuspid Valve: There is mild regurgitation.    Pulmonary Artery: The estimated pulmonary artery systolic pressure is   32 mmHg.             Active Cardiac Conditions: None        Revised Cardiac Risk Index   High -Risk Surgery  Intraperitoneal; Intrathoracic; suprainguinal vascular Yes- + 1 No- 0   History of Ischemic Heart Disease   (Hx of MI/positive exercise test/current chest pain due to ischemia/use of nitrate therapy/EKG with pathological Q waves) Yes- + 1 No- 0   History of CHF  (Pulmonary edema/bilateral rales or S3 gallop/PND/CXR showing pulmonary  vascular redistribution) Yes- + 1 No- 0   History of CVA   (Prior stroke or TIA) Yes- + 1 No- 0   Pre-operative treatment with insulin Yes- + 1 No- 0   Pre-operative creatinine > 2mg/dl Yes- + 1 No- 0   Total:2      Risk Status:  Estimated risk of cardiac complications after non-cardiac surgery using the Revised Cardiac Risk Index for Preoperative risk is 10.1 %      ARISCAT (Canet) risk index: Low: 1.6% risk of post-op pulmonary complications; Intermediate: 13.3% risk of post-op pulmonary complications if surgery is > 3 hours.     American Society of Anesthesiologists Physical Status classification (ASA): 3                       Orders Placed This Encounter    CBC Auto Differential    Hemoglobin A1C    Ambulatory referral/consult to Cardiology    Ambulatory referral/consult to Hepatology    Ambulatory referral/consult to Nephrology         Assessment/Plan:     Colon adenocarcinoma  Scheduled with Dr. Calderon on 7/28/25 for right laparoscopic colectomy.    Pulmonary nodule  EDISON per CT chest 4/21/25  Former Smoker  Not followed per Pulmonology  Denies CP/SOB/congestion/wheezing  Reports cough    CT chest 4/21/25  FINDINGS:  Structures at the base of the neck are unremarkable.  No intrathoracic adenopathy.  No anterior pericardial effusion.  Mild aortic arch and coronary calcific atherosclerosis.  No anterior pericardial effusion.  The trachea is clear.  Incidental mild gynecomastia.     Lungs are symmetrically expanded with mild bibasilar atelectasis or scarring.  Upper lobe predominant emphysema.  Slight lobular left lung juxtapleural nodule measuring 11 mm (series 3, image 61), similar.  Punctate right perifissural left upper lobe nodules, similar.  Partial calcified pleural plaque on the right anteriorly, similar.  No new suspicious focal opacity or mass.  No pleural effusion.     Limited images through the unenhanced upper abdomen demonstrate slight nodular liver surface contour.  Spine DJD.  Stable sclerotic  focus at the left posterior 8th rib.  No aggressive osseous lesion.     Impression:     Stable appearance and size of slight lobular juxtapleural left lung nodule measuring 11 mm.  No new suspicious pulmonary nodule or mass.     Pulmonary emphysema, cirrhotic appearing liver, and additional findings as above.        Electronically signed by: Alex Harper  Date:                                            04/21/2025  Time:                                           18:25    A-fib  Treated with Eliquis; awaiting instructions from Cardiology  Not followed per Cardiology; has appt. on 7/24 @ 1100  Denies bleeding issues.         PAD (peripheral artery disease)  Reports mild pain with walking and right leg N/T  S/P right fem-AK popliteal bypass January 2022; has left SFA stent  Not followed Vascular Clinic ; Cardiology pending.     REKHA results: 7/19/24  Impression    Normal ABIs. Slightly low right toe-brachial index may represent microvascular disease.     Electronically Signed By: Shade Huang MD 7/19/2024 9:53 AM CDT  Narrative    [facility] [place] US NON INVASIVE LEG ARTERIES COMPLETE BILATERAL   (Bilateral lower extremity ankle brachial indices and segmental pressure measurements.)     INDICATION: i73.9 I73.9   Peripheral vascular disease, unspecified (CMS/HCC). Bilateral leg cramping. Hypertension. Previous smoker. Diabetic.     FINDINGS:   Right ankle-brachial index: 1.1 (normal >1.0)   Left ankle-brachial index: 1.1 (normal > 1.0)   Right toe-brachial index: 0.5 (normal > 0.7)   Left toe-brachial index: 0.7  (normal > 0.7)   Bilateral lower extremity segmental pressure measurements reveal incompressible upper thigh arteries.    US BLE 4/27/25  Impression:     No hemodynamically significant stenosis demonstrated in the right or left lower extremity arterial system.     Patent fem-pop bypass on the right.     Patent SFA stent on the left.        Electronically signed by: Rodger Shipley  Date:                                             04/28/2025  Time:                                           00:22    HTN (hypertension)  Current BP  uncontrolled today.; 208/81 and repeat 185/75  Taking: amlodipine- did take medication today.  Cardiology appt. pending  Requesting outside PCP [facility] from [facility] for more history    Lifestyle changes to reduce systolic BP:   exercise 30 minutes per day,  5 days per week or 150 minutes weekly; sodium reduction and avoidance of high salt foods such as processed meats, frozen meals and  fast foods.   Keeping a healthy weight/BMI can help with better BP control   I recommend monitoring BP during perioperative period as uncontrolled pain can elevate blood pressure.         Chronic diastolic congestive heart failure  Not followed per cardiology.   Denies chest pain, paroxysmal nocturnal dyspnea, orthopnea, edema, dizziness, syncope.   Reports CARTER    Echo Saline Bubble? Yes    Interpretation Summary    Left Ventricle: The left ventricle is normal in size. Normal wall thickness. Normal wall motion. There is normal systolic function with a visually estimated ejection fraction of 60 - 65%. Grade I diastolic dysfunction.    Right Ventricle: The right ventricle is normal in size. Systolic function is normal.    Left Atrium: Severely dilated measuring 50 mL/m2 Agitated saline study of the atrial septum is positive, consistent with an intracardiac shunt at the atrial level.    Right Atrium: Right atrium is severely dilated.    Mitral Valve: There is mild regurgitation.    Tricuspid Valve: There is mild regurgitation.    Pulmonary Artery: The estimated pulmonary artery systolic pressure is 32 mmHg.          Pulmonary hypertension  PASP= 32 mmHg per TTE 4/28/25    I recommend to avoid intravascular volume overload or reduce pre load    CKD stage 3b, GFR 30-44 ml/min  No changes in urine volume.   Most recent GFR:  32  BUN=  19  Cr = 2.1  Recommend to avoid NSAIDs, reduce salt intake, maintain adequate hydration,  and exercise.  Not followed per Nephrology; will place referral    Tylenol as needed for pain    I  suggest monitoring renal function, input and output status mendy-operatively. I  suggest avoiding nephrotoxic medication including NSAIDs, COX2 inhibitors, intravenous contrast agent,avoiding hypotension to prevent further renal impairment.      BPH (benign prostatic hyperplasia)  Per outside problem list  Reports nocturia  Not followed per Urology    There is an increased risk of post-op urinary retention.    History of primary malignant neoplasm of liver  S/P liver resection 9/5/23; denies radiation and chemo  Lost to follow-up due to incarceration  Needs follow-up with Hepatology; referral placed.  Cirrhosis noted on CT abdomen/pelvis 6/17/25; patient denies diagnosis  Spleen prominent  Does not drink alcohol  INR: 1.1  Platelet count: 121- low  LFTs normal    Please be aware that surgery can predispose the patient to hepatic decompensation.  I suggest avoidance of Hypotension in the perioperative period that might precipitate hepatic decompensation.I suggest avoidance of excessive IV fluids that might contribute to ascites.  Please monitor Liver,renal function ,INR in the perioperative period.    MELD Score:15      VOCAL- JAMES SCORE  Predicted Postoperative Outcomes by the VOCAL-[place] Score:      30-day mortality: 0.5%      90-day mortality: 1.4%      180-day mortality: 3.8%      90-day decompensation: 7.5%      Iron deficiency anemia due to chronic blood loss  Current labs:  Hgb-13.1      Hct-  42.9; improved.  Followed per Hem-Onc  S/P GI Bleed April 2025  S/P Venofer infusion- next due 7/24    Recommend monitoring during perioperative period.     DM (diabetes mellitus)  Currently takes: Glucotrol    Most recent A1c is 5.7.    Managed per outside PCP    Reports peripheral neuropathy.   Denies visual changes. Up to date with eye exams.   Micro changes: Denies- Retinopathy, Nephropathy   Macro changes: Denies-   Carotid, Coronary ; Has Peripheral disease     Recommendations:   Stay active and exercise using cardio, stretching, and weights. Exercise at least 30 minutes 5x weekly. Try to get at least 7500-10,000 steps daily. Recommend Mediterranean diet and avoid fast food, decrease salt intake and carbohydrates such as bread, potatoes, sugary snacks/sodas, cakes and cookies.      Discussion/Management of Perioperative Care    Thromboembolic prophylaxis (VTE) Care: Risk factors for thrombosis include: age and surgical procedure.  I recommend prophylaxis of thromboembolism with the use of compression stockings/pneumatic devices, and/or pharmacologic agents. The benefits should outweigh the risks for pharmacologic prophylaxis in the perioperative period. I also encourage early ambulation if not contraindicated during the post-operative period.    Risk factors for post-operative pulmonary complications include:age > 65 years, cirrhosis, congestive heart failure, diabetes mellitus, HTN, pre-existing renal disease, and vascular disease. To reduce the risk of pulmonary complications, prophylactic recommendations include: incentive spirometry use/deep breathing, end tidal carbon dioxide monitoring, and pain control.    Risk factors for renal complications include: Pre-existing renal disease, age, diabetes mellitus, HTN, vascular disease, CHF, and cirrhosis. To reduce the risk of postoperative renal complications, I recommend the patient maintain adequate hydration.  Avoid/reduce NSAIDS and LICONA-2 inhibitors use as well as IV contrast for renal protection.    I recommend the use of appropriate prophylactic antibiotics to reduce the risk of surgical site infections.    Delirium risk factors include advanced age. I recommend to avoid/reduce use of benzodiazepine use (not for patients who take on a regular basis), anticholinergics, Benadryl,  and agents that may cause postoperative serotonin syndrome.  Controlled pain can decrease the  risk for postop delirium and since opioids are used for postoperative pain control, I suggest using the lowest dose for the shortest amount of time necessary for pain management.     The patient is at an increased risk for urinary retention due to : BPH/LUTS and advanced age. I recommend to avoid/decrease the use of benzodiazepines, anticholinergics, and Benadryl in the perioperative period. I also recommend using opioids for the shortest period of time if possible.        Diabetes Mellitus-I recommend monitoring the glucose in the perioperative period ( Before meals and bed time,if the patient is on oral feeds or every 6 hourly ,if the patient is NPO ).   Blood glucose target in hospitalized patients is 140-180. Oral Hypoglycemic agents are generally avoided during the hospital stay . If glucose is consistently elevated ,I recommend using a basal prandial Insulin regimen to control the glucose - as elevated glucose can be associated with adverse surgical outcomes. Please consider involving Hospital Medicine or Endocrinology , if any help is needed with Glucose control. Patient will be instructed based on the pre op clinic guidelines about adjustment of diabetic treatment (If applicable). These guidelines are per recommendations of the Endocrinology department.     I recommend to monitor fluid volume status during the perioperative period for signs of fluid overload due to patient's diastolic dysfunction. I recommend avoiding continuous IV fluids; if IV fluids are needed- please order for a specific time frame and consider lowering IV fluid rate.          This visit was focused on Preoperative evaluation, Perioperative Medical management, complication reduction plans. I suggest that the patient follows up with primary care or relevant sub specialists for ongoing health care.    I appreciate the opportunity to be involved in this patients care. Please feel free to contact me if there were any questions about this  consultation.      I spent a total of 92 minutes on the day of the visit.  This includes face to face time and non-face to face time preparing to see the patient (e.g., review of tests), obtaining and/or reviewing separately obtained history, documenting clinical information in the electronic or other health record, independently interpreting results and communicating results to the patient/family/caregiver, or care coordinator.       7/24/25: Discussed case with collaborative physician , Dr. Max: Hepatology eval not needed before surgery as this is a time sensitive procedure. Will mail out Hepatology appt.- scheduled in September 2025. Optimized per Cardiology (Aniyah) today and no further cardiac testing is needed before surgery. Received Eliquis instructions to hold 3 days before surgery. Last dose should be today and start holding on Friday 7/25.    Patient is optimized for surgery.          Megan Whitman NP  Perioperative Medicine  [facility]

## 2025-07-23 NOTE — ASSESSMENT & PLAN NOTE
Currently takes: Glucotrol    Most recent A1c is 5.7.    Managed per outside PCP    Reports peripheral neuropathy.   Denies visual changes. Up to date with eye exams.   Micro changes: Denies- Retinopathy, Nephropathy   Macro changes: Denies-  Carotid, Coronary ; Has Peripheral disease     Recommendations:   Stay active and exercise using cardio, stretching, and weights. Exercise at least 30 minutes 5x weekly. Try to get at least 7500-10,000 steps daily. Recommend Mediterranean diet and avoid fast food, decrease salt intake and carbohydrates such as bread, potatoes, sugary snacks/sodas, cakes and cookies.

## 2025-07-23 NOTE — Clinical Note
Good afternoon,  Mr. Meza was seen by me today for surgery schedule Monday 7/28 with Dr.Johnston- Molina. Colectomy. He has a history of liver cancer; S/P resection in 2023 without follow-up due to incarceration. CT abdomen/pelvis reveals cirrhotic liver. MELD score in 15. Prominent spleen, LFTs/INR OK. Platelets mildly low- 121. Hepatology referral placed but so far no response for appt. in 2 days. Historically, can be difficult to get an appt. within 2 day span. My question: Given colon cancer diagnosis, is it acceptable to move forward with procedure and get Hepatology appt. about 6 weeks after surgery? Or should patient be evaluated before moving forward with surgery?

## 2025-07-23 NOTE — HPI
This is a 74 y.o. male  who presents today with friend for a preoperative evaluation in preparation for right laparoscopic colectomy.  Surgery is indicated for colon adenocarcinoma.   Patient is new to me.  The history has been obtained by speaking with the patient and reviewing the electronic medical record and/or outside health information. Significant health conditions for the perioperative period are discussed below in assessment and plan.   Patient reports current health status to be Good.  Denies any new symptoms before surgery.

## 2025-07-24 ENCOUNTER — TELEPHONE (OUTPATIENT)
Dept: SURGERY | Facility: CLINIC | Age: 75
End: 2025-07-24
Payer: COMMERCIAL

## 2025-07-24 ENCOUNTER — OFFICE VISIT (OUTPATIENT)
Dept: CARDIOLOGY | Facility: CLINIC | Age: 75
End: 2025-07-24
Payer: COMMERCIAL

## 2025-07-24 VITALS
RESPIRATION RATE: 18 BRPM | OXYGEN SATURATION: 99 % | HEART RATE: 59 BPM | BODY MASS INDEX: 24.81 KG/M2 | HEIGHT: 68 IN | DIASTOLIC BLOOD PRESSURE: 72 MMHG | WEIGHT: 163.69 LBS | SYSTOLIC BLOOD PRESSURE: 138 MMHG

## 2025-07-24 DIAGNOSIS — I10 HYPERTENSION, UNSPECIFIED TYPE: ICD-10-CM

## 2025-07-24 DIAGNOSIS — I48.91 ATRIAL FIBRILLATION, UNSPECIFIED TYPE: Chronic | ICD-10-CM

## 2025-07-24 DIAGNOSIS — I73.9 PAD (PERIPHERAL ARTERY DISEASE): ICD-10-CM

## 2025-07-24 DIAGNOSIS — I25.10 CORONARY ARTERY DISEASE, UNSPECIFIED VESSEL OR LESION TYPE, UNSPECIFIED WHETHER ANGINA PRESENT, UNSPECIFIED WHETHER NATIVE OR TRANSPLANTED HEART: Primary | ICD-10-CM

## 2025-07-24 PROCEDURE — 99999 PR PBB SHADOW E&M-EST. PATIENT-LVL V: CPT | Mod: PBBFAC,,, | Performed by: INTERNAL MEDICINE

## 2025-07-24 NOTE — PROGRESS NOTES
CARDIOVASCULAR PROGRESS NOTE    REASON FOR CONSULT:   Oneil Meza is a 74 y.o. male who presents for establishment of cardiology care.    CARDIOVASCULAR HISTORY:   Paroxysmal atrial fibrillation on low-dose Eliquis (due to GI bleeding)  Type 2 diabetes mellitus with most recent A1c of 5.7 in July, 2025  Ischemic CVA in 2015  Coronary and abdominal aortic calcification  CKD stage 3 with creatinine around 1.8-2.0  Patent foramen ovale  Type 2 diabetes mellitus - A1C 5.7 in July, 2025  Peripheral arterial disease status post right fem-pop bypass and left SFA stent in 2022 - follows up with vascular surgery at [facility].  Hepatocellular carcinoma status post resection    HISTORY OF PRESENT ILLNESS:     Has been referred to Cardiology Clinic for preop risk stratification prior to right laparoscopic colectomy which is being done due to colon adenocarcinoma.    At baseline patient is fairly active and denies getting any chest pain, shortness of breath or palpitation with activities of daily life.  He gets claudication symptoms on right lower extremity with prolonged walking.  He can go up 1 flight of stairs or he can walk 1 block without significant symptoms suggestive of angina.    He was admitted at [facility] due to abnormal labs which were done as outpatient.  It was found to have hemoglobin of 6.5.  Patient did not notice any obvious bleeding.  He was given a unit of blood and GI evaluated him an EGD was done which did not show any acute bleeding.  Colonoscopy was also done and no acute bleeding was found.  Hemoglobin remained stable and he was discharged home on low-dose Eliquis.  His most recent hemoglobin is 13.1.    Extensive smoking history spanning over 50 pack years.  He quit in 2016.  Denies any family history of premature CAD.    PAST MEDICAL HISTORY:     Past Medical History:   Diagnosis Date    Anticoagulant long-term use     Coronary artery disease     Disorder of kidney and  ureter     Hypertension        PAST SURGICAL HISTORY:     Past Surgical History:   Procedure Laterality Date    COLONOSCOPY N/A 5/30/2025    Procedure: COLONOSCOPY;  Surgeon: Korey Bettencourt MD;  Location: [facility];  Service: Endoscopy;  Laterality: N/A;    ESOPHAGOGASTRODUODENOSCOPY N/A 4/28/2025    Procedure: EGD (ESOPHAGOGASTRODUODENOSCOPY);  Surgeon: Korey Bettencourt MD;  Location: [facility];  Service: Endoscopy;  Laterality: N/A;    VASCULAR SURGERY      stent to left leg artery-per pt report       ALLERGIES AND MEDICATION:     Review of patient's allergies indicates:   Allergen Reactions    Iodine and iodide containing products         Medication List            Accurate as of July 24, 2025 11:41 AM. If you have any questions, ask your nurse or doctor.                PAUSE taking these medications      amLODIPine 10 MG tablet  Wait to take this until your doctor or other care provider tells you to start again.  Commonly known as: NORVASC  Take 1 tablet (10 mg total) by mouth once daily.            CONTINUE taking these medications      albuterol 90 mcg/actuation inhaler  Commonly known as: PROVENTIL/VENTOLIN HFA     apixaban 2.5 mg Tab  Commonly known as: ELIQUIS  Take 1 tablet (2.5 mg total) by mouth 2 (two) times daily.     atorvastatin 20 MG tablet  Commonly known as: LIPITOR     cyanocobalamin (vitamin B-12) 1,000 mcg/mL Kit     glipiZIDE 10 MG Tr24  Commonly known as: GLUCOTROL     HYDROcodone-acetaminophen 7.5-325 mg per tablet  Commonly known as: NORCO  Take 0.5 tablets by mouth nightly as needed for Pain.     nitroGLYCERIN 0.4 MG SL tablet  Commonly known as: NITROSTAT     pantoprazole 40 MG tablet  Commonly known as: PROTONIX     polyethylene glycol 17 gram/dose powder  Commonly known as: GLYCOLAX              SOCIAL HISTORY:   Social History[1]    FAMILY HISTORY:     Family History   Problem Relation Name Age of Onset    No Known Problems Mother      No Known Problems Father      Cancer Brother    "      REVIEW OF SYSTEMS:   Review of Systems   Constitutional:  Negative for chills and fever.   HENT:  Negative for hearing loss.    Eyes:  Negative for blurred vision and double vision.   Respiratory:  Negative for cough and hemoptysis.    Cardiovascular:  Positive for claudication. Negative for chest pain and palpitations.   Gastrointestinal:  Negative for heartburn and nausea.   Genitourinary:  Negative for dysuria and urgency.   Musculoskeletal:  Negative for myalgias.   Neurological:  Negative for dizziness and headaches.   Endo/Heme/Allergies:  Negative for environmental allergies. Does not bruise/bleed easily.   Psychiatric/Behavioral:  Negative for depression.          PHYSICAL EXAM:     Vitals:    07/24/25 1056   BP: 138/72   Pulse: (!) 59   Resp: 18    Body mass index is 24.89 kg/m².  Weight: 74.2 kg (163 lb 11.1 oz)   Height: 5' 8" (172.7 cm)     Gen: NAD  Head/Eyes/Ears/Nose: MMM, good dentition   Neck: No carotid bruits, no JVD  Lung: Clear to auscultation bilaterally, no wheezes/rales/ronchi, symmetrical lung expansion with inspiration  Heart: Normal S1/S2, Irregular rate and rhythm, no murmurs/rubs/gallops  Abdomen: Soft, NT/ND, no masses  Extremities: No lower extremity edema.  No wounds or other skin lesions  Skin: Normal color and turgor. No wounds rashes, no petechia, no ecchymoses.   Neuro: AAOx3    DATA:     Laboratory:  CBC:  Recent Labs   Lab 06/17/25  1339 06/23/25  1220 07/23/25  1144   WBC 3.43 L 2.45 L 2.51 L   HGB 11.5 L 10.3 L 13.1 L   HCT 38.6 L 35.6 L 42.9   Platelet Count 151 156 121 L       CHEMISTRIES:  Recent Labs   Lab 04/28/25  0631 04/29/25  0350 05/29/25  1037 05/30/25  0431 05/31/25  0506 06/17/25  1339 06/23/25  1220   Glucose 72   < > 188 H 48  H 126 H 159 H   Sodium 136   < > 139 139 137 139 143   Potassium 4.0   < > 4.2 4.1 4.4 3.8 4.2   BUN 24 H   < > 21 17 14 13 19   Creatinine 1.9 H   < > 2.2 H 1.8 H 1.8 H 1.6 H 2.1 H   eGFR 36 L   < > 30 L 39 L 39 L 44 L 32 L "   Calcium 8.9   < > 9.4 8.6 L 8.2 L 9.8 9.7   Magnesium  2.0  --   --   --   --   --   --     < > = values in this interval not displayed.       CARDIAC BIOMARKERS:  Recent Labs   Lab 04/27/25  1257   Troponin-I 0.014       HBA1C:  Hemoglobin A1C   Date Value Ref Range Status   07/11/2016 6.8 (H) 4.5 - 6.2 % Final     Comment:     According to ADA guidelines, hemoglobin A1C <7.0% represents  optimal control in non-pregnant diabetic patients.  Different  metrics may apply to specific populations.   Standards of Medical Care in Diabetes - 2016.  For the purpose of screening for the presence of diabetes:  <5.7%     Consistent with the absence of diabetes  5.7-6.4%  Consistent with increasing risk for diabetes   (prediabetes)  >or=6.5%  Consistent with diabetes  Currently no consensus exists for use of hemoglobin A1C  for diagnosis of diabetes for children.     04/19/2016 7.4 (H) <5.7 Final     Comment:     Units:  % of total Hgb  According to ADA guidelines, hemoglobin A1c <7.0%  represents optimal control in non-pregnant diabetic  patients. Different metrics may apply to specific  patient populations. Standards of Medical Care in  Diabetes-2013. Diabetes Care. 2013;36:s11-s66  For the purpose of screening for the presence of  diabetes  <5.7%       Consistent with the absence of diabetes  5.7-6.4%    Consistent with increased risk for diabetes  (prediabetes)  >or=6.5%    Consistent with diabetes  This assay result is consistent with diabetes  mellitus.  Currently, no consensus exists for use of hemoglobin  A1c for diagnosis of diabetes for children.  Test Performed at:  [facility]  [address] [place]  [address]     LILY BRADSHAW MD       Hemoglobin A1c   Date Value Ref Range Status   07/23/2025 5.7 (H) 4.0 - 5.6 % Final     Comment:     ADA Screening Guidelines:  5.7-6.4%  Consistent with prediabetes  >=6.5%  Consistent with diabetes    High levels of fetal hemoglobin interfere with the  HbA1C  assay. Heterozygous hemoglobin variants (HbS, HgC, etc)do  not significantly interfere with this assay.   However, presence of multiple variants may affect accuracy.   2025 6.3 (H) 4.0 - 5.6 % Final     Comment:     ADA Screening Guidelines:  5.7-6.4%  Consistent with prediabetes  >=6.5%  Consistent with diabetes    High levels of fetal hemoglobin interfere with the HbA1C  assay. Heterozygous hemoglobin variants (HbS, HgC, etc)do  not significantly interfere with this assay.   However, presence of multiple variants may affect accuracy.        COAGS:  Recent Labs   Lab 25  1257 25  1339   INR 1.2 1.1       LIPIDS/LFTS:  Recent Labs   Lab 25  0506 25  1339 25  1220   AST 15 37 37   ALT 5 L 19 24         CARDIAC DIAGNOSTICS:  :     [id number]  Atrial fibrillation with controlled ventricular response    ECHO  2025    Left Ventricle: The left ventricle is normal in size. Normal wall thickness. Normal wall motion. There is normal systolic function with a visually estimated ejection fraction of 60 - 65%. Grade I diastolic dysfunction.    Right Ventricle: The right ventricle is normal in size. Systolic function is normal.    Left Atrium: Severely dilated measuring 50 mL/m2 Agitated saline study of the atrial septum is positive, consistent with an intracardiac shunt at the atrial level.    Right Atrium: Right atrium is severely dilated.    Mitral Valve: There is mild regurgitation.    Tricuspid Valve: There is mild regurgitation.    Pulmonary Artery: The estimated pulmonary artery systolic pressure is 32 mmHg.    3.  STRESS TEST  Lexiscan in   1. Negative Lexiscan nuclear stress test for ischemia.  Estimated LVEF of 63%.   2. Resting hypertension.     4.  CARDIAC CATHETERIZATION  NA    5.  IMAGING   CT chest in  showed mild coronary and abdominal aortic calcification      ASSESSMENT:   Preop risk stratification prior to right laparoscopic  colectomy  Paroxysmal atrial fibrillation on low-dose Eliquis (due to GI bleeding)  Type 2 diabetes mellitus with most recent A1c of 5.7 in July, 2025  Ischemic CVA in 2015  Coronary and abdominal aortic calcification  CKD stage 3 with creatinine around 1.8-2.0  Patent foramen ovale  Type 2 diabetes mellitus - A1C 5.7 in July, 2025  Peripheral arterial disease status post right fem-pop bypass and left SFA stent in 2022 - follows up with vascular surgery at [facility].  Hepatocellular carcinoma status post resection    He is able to achieve >4 METS without issues.  Although he does have cardiovascular disease risk factors but he is medically optimized.    RCRI score is 4 which means 10% chance of adverse outcome within 1st 30 days of surgery.    Found to have PFO noted on recent echocardiogram.  No new neurological symptoms.  Would continue with anticoagulation.    PLAN:   He is at intermediate to high-risk for this laparoscopic abdominal surgery.  He is medically optimized and no further cardiac testing is needed  Get nonurgent transthoracic echocardiogram and exercise nuclear stress test  He did not remember his medications.  Told him to bring his pill bottles next time  Heart rate under control.  Total daily rate-controlling aged  Continue Eliquis 2.5 mg b.i.d. for now for stroke prophylaxis.  If hemoglobin remained stable, would recommend full strength Eliquis with 5 mg b.i.d., if okay with GI  To consider carotid duplex during next office visit  Hold off on baby aspirin to minimize bleeding risk  Continue atorvastatin 20 mg daily.  Repeat lipid profile before next office visit  Follow with vascular surgery at [facility] as scheduled  Mediterranean diet and daily light exercise    Follow up in 4-5 months    Addendum:  It is okay to hold Eliquis 3 days prior to laparoscopic abdominal surgery and resume afterwards once the surgical team is okay.      Eliana Dill MD  [facility]  "Cardiology    Visit today included increased complexity associated with the care of the episodic problem preop risk stratification, hypertension, hyperlipidemia, paroxysmal atrial fibrillation addressed and managing the longitudinal care of the patient due to the serious and/or complex managed problem(s)..     This note was created by combination of typed  and M-Modal dictation.   Transcription errors may be present.            [1]   Social History  Socioeconomic History    Marital status: Single   Tobacco Use    Smoking status: Former     Current packs/day: 0.00     Average packs/day: 1 pack/day for 52.0 years (52.0 ttl pk-yrs)     Types: Cigarettes     Start date:      Quit date:      Years since quittin.5    Smokeless tobacco: Never    Tobacco comments:     Pt is a former 1 pk/day cigarette smoker ("on and off") x 52 yrs. He states that he quit smoking in 2016 and has not relapsed. EMR updated to reflect "Former Smoker" status.   Substance and Sexual Activity    Alcohol use: No    Drug use: Yes     Comment: heroin-snort     Social Drivers of Health     Financial Resource Strain: Low Risk  (2025)    Overall Financial Resource Strain (CARDIA)     Difficulty of Paying Living Expenses: Not hard at all   Food Insecurity: Food Insecurity Present (2025)    Hunger Vital Sign     Worried About Running Out of Food in the Last Year: Often true     Ran Out of Food in the Last Year: Sometimes true   Transportation Needs: No Transportation Needs (2025)    PRAPARE - Transportation     Lack of Transportation (Medical): No     Lack of Transportation (Non-Medical): No   Physical Activity: Patient Declined (2025)    Exercise Vital Sign     Days of Exercise per Week: Patient declined     Minutes of Exercise per Session: Patient declined   Stress: Stress Concern Present (2025)    [employer] [place] [employer] - Occupational Stress Questionnaire     Feeling of Stress : Very " much   Housing Stability: Low Risk  (5/29/2025)    Housing Stability Vital Sign     Unable to Pay for Housing in the Last Year: No     Number of Times Moved in the Last Year: 0     Homeless in the Last Year: No   Recent Concern: Housing Stability - High Risk (4/27/2025)    Housing Stability Vital Sign     Unable to Pay for Housing in the Last Year: Yes     Number of Times Moved in the Last Year: 0     Homeless in the Last Year: No

## 2025-07-25 DIAGNOSIS — N18.32 CKD STAGE 3B, GFR 30-44 ML/MIN: Primary | ICD-10-CM

## 2025-07-25 NOTE — PRE-PROCEDURE INSTRUCTIONS
Anesthesia Pre-Op Instructions given:     -- YOUR SURGEON'S OFFICE WILL PROVIDE YOUR ARRIVAL TIME  -- Medication information (what to hold and what to take)   -- NPO guidelines as follows: (or as per your Surgeon)  Stop ALL solid food, gum, candy 8 hours before arrival time.  Stop all CLOUDY liquids: coffee with creamer, cloudy juices, 8 hours prior to arrival time.  The patient should be ENCOURAGED to drink carbohydrate-rich clear liquids (sports drinks, clear juices) until 2 hours prior to arrival time.  Stop clear liquids 2 hours prior to arrival time.  CLEAR liquids include water, black coffee NO creamer, clear oral rehydration drinks, clear sports drinks and clear fruit juices (no orange juice, no pulpy juices, no apple cider).   IF IN DOUBT, drink water instead.   NOTHING TO DRINK 2 hours before surgery/procedure time. If you are told to take medication on the morning of surgery, it may be taken with a sip of water.   -- *Arrival place and directions given*.  Time to be given the day before procedure by the Surgeon's Office   -- Bathe with antibacterial soap (dial or Hibiclens as instructed)  -- Don't wear any jewelry or valuables and not metals on skin or hair AM of surgery   -- No makeup or moisturizer to face   -- No perfume/cologne, powder, lotions, aftershave or deodorant     Pt verbalized understanding.            *If going to , see below:      Directions and Instructions for [facility]   At [facility], we have an outstanding team of physicians, anesthesiologists, CRNAs, Registered Nurses, Surgical Technologists, and other ancillary team members all focused on your surgical and procedural care.   Before Your Procedure:   The physician's office will call you with a specific arrival time and directions a day or two before your scheduled procedure. You may also receive these instructions through your [facility] portal.   Day of Procedure:   Please be sure to arrive at  the arrival time given or you may risk your surgery being delayed or canceled. The arrival time is earlier than your scheduled surgery or procedure time. In the winter months please dress warm and bring blankets for you or your child as the waiting room may be cold. If you have difficulty locating the facility, please give us a call at [phone].   Directions:   The [facility] is located on the 1st floor of the hospital building near the Force entrance.   Parking:   You will park in the South Parking Garage (note location on map). [facility] opens at 5:00 a.m. and has a drop off area by the entrance.  parking is available starting at 7:00 a.m. Please see below for further  parking instructions.   Directions from the parking garage elevators   Blue [facility] Elevators: From the parking garage, take the blue [employer] elevators (located in the center of the parking garage) to the 1st floor of the garage. You will then take a right once off the elevators then another right to the outside of the parking garage. You will be across from the [facility]. You will walk down the sidewalk, pass the  curve at the Force entrance and continue to follow the sidewalk. You will pass the radiation oncology entrance on your right. Continue to follow the sidewalk to the [facility] glass door entrance.   Hospital Entrance (Inside Route): If a mostly inside route is preferred: Take the inside elevator bank (located at the far north end of the garage) from the parking garage to the 1st floor. On the 1st floor walk past [employer]. Keep walking down the center of the hallway towards the hospital elevators. Once you reach the red brick caroline, take a left and go past the hospital elevators. Take another left and follow the blue and white [employer] signs around the hallway to the end. Go outside of the door. You will see the [facility]  Center entrance to your right.   Drop Off:   There is a drop off area at the doors of the [facility] for your convenience. If utilized for pediatric patients, an adult must accompany the patient into the surgery center while another adult rey the vehicle.    (at 7:00 a.m.):   Upon check-in, please let the  know that you are utilizing [employer] parking which is free. The . will then call [employer] for your car to be picked up. Your keys and phone number will be collected and given to [employer] services. You will then be given a ticket. Upon discharge, [employer] will be notified to bring your vehicle back when you are ready.           Directions to [facility] [place]:  [phone]     From 1st floor garage elevators: go past [facility] [employer] shop. Look for Black piano on side of coffee shop. Take Atrium (gold) elevator by piano up to 2nd floor. When you exit elevator follow long hallway (you should see a sign hanging from the ceiling that says Day of Surgery Family Waiting Room. When hallway ends you will be entering the day of surgery waiting area. Check in at the desk for your procedure.      From [facility] entrance: Make a right after you enter the door to the hospital. On your Left, take Concourse elevator to 2nd floor. Check in at desk      From Lab desk on 2nd floor: Exit lab area toward hospital atrium. You should see a sign that says Day of Surgery Family Waiting Area. Make a Left and follow long hallway (you should see a sign hanging from the ceiling that says Day of Surgery Family Waiting Room. When hallway ends you will be entering the day of surgery waiting area. Check in at the desk for your procedure.

## 2025-07-26 ENCOUNTER — ANESTHESIA EVENT (OUTPATIENT)
Dept: SURGERY | Facility: HOSPITAL | Age: 75
End: 2025-07-26
Payer: COMMERCIAL

## 2025-07-26 NOTE — ANESTHESIA PREPROCEDURE EVALUATION
[facility]  Anesthesia Pre-Operative Evaluation         Patient Name: Oneil Meza  YOB: 1950  MRN: [id number]    SUBJECTIVE:     Pre-operative evaluation for Procedure(s) (LRB):  COLECTOMY, RIGHT, LAPAROSCOPIC, ERAS high (N/A)     07/26/2025    Oneil Meza is a 74 y.o. male w/ a significant PMHx of  CAD, HTN, Pafib (on eliquis), CVA 2015, cirrhosis, hepatocellular carcinoma Tx with resection, PAD (Femoral artery stenting on the right January 5, 2022), CKD3, DM2, HFpEF (G1DD), and colon adenocarcinoma.     Patient now presents for the above procedure(s).      LDA:      Prev airway:   9/05/2023:  Final airway type: Endotracheal airway  Successful airway: Oral ETT  Cuffed: Cuffed  Successful intubation technique: Direct laryngoscopy  Facilitating devices/methods: Cricoid pressure and Stylet  Endotracheal tube insertion site: Oral  Blade: Walden  Blade size: #2  Placement verified by: auscultation, CO2 detection, chest rise and direct visualization  Breath Sounds: Equal  Cormack-Lehane Classification: grade IIa - partial view of glottis  ETT size: 7.5mm  Inital cuff pressure (cm H2O): MOP  ETT to lips (cm): 22  Measured from: Lips  Lips/Dentition: Unchanged  Secured Method: Pink tape Secured Location: Right  Number of attempts at approach: 1     Drips: None documented.    Problem List[1]    Review of patient's allergies indicates:   Allergen Reactions    Iodine and iodide containing products        Current Outpatient Medications:  Current Medications[2]    Past Surgical History:   Procedure Laterality Date    COLONOSCOPY N/A 5/30/2025    Procedure: COLONOSCOPY;  Surgeon: Korey Bettencourt MD;  Location: [facility] ENDO;  Service: Endoscopy;  Laterality: N/A;    ESOPHAGOGASTRODUODENOSCOPY N/A 4/28/2025    Procedure: EGD (ESOPHAGOGASTRODUODENOSCOPY);  Surgeon: Korey Bettencourt MD;  Location: [facility] ENDO;  Service: Endoscopy;  Laterality: N/A;    VASCULAR SURGERY      stent to left leg  artery-per pt report       Social History[3]    OBJECTIVE:     Vital Signs Range (Last 24H):         Significant Labs:  Lab Results   Component Value Date    WBC 2.51 (L) 07/23/2025    HGB 13.1 (L) 07/23/2025    HCT 42.9 07/23/2025     (L) 07/23/2025    CHOL 163 07/11/2016    TRIG 57 07/11/2016    HDL 65 07/11/2016    ALT 24 06/23/2025    AST 37 06/23/2025     06/23/2025    K 4.2 06/23/2025     06/23/2025    CREATININE 2.1 (H) 06/23/2025    BUN 19 06/23/2025    CO2 26 06/23/2025    PSA 0.08 07/11/2016    INR 1.1 06/17/2025    HGBA1C 5.7 (H) 07/23/2025       Diagnostic Studies: No relevant studies.    EKG:   Results for orders placed or performed during the hospital encounter of 06/17/25   EKG 12-lead    Collection Time: 06/17/25  1:25 PM   Result Value Ref Range    QRS Duration 98 ms    [facility] QTC Calculation 446 ms    Narrative    Test Reason : K92.2,    Vent. Rate :  60 BPM     Atrial Rate : 300 BPM     P-R Int :    ms          QRS Dur :  98 ms      QT Int : 446 ms       P-R-T Axes :     82  66 degrees    QTcB Int : 446 ms    Atrial fibrillation  Nonspecific T wave abnormality  Abnormal ECG  When compared with ECG of 27-Apr-2025 23:53,  No significant change was found  Confirmed by Jozef Flores ([id number]) on 6/17/2025 4:31:25 PM    Referred By: JOANNA GREER           Confirmed By: Jozef Flores       2D ECHO:  TTE 4/28/2025:    Left Ventricle: The left ventricle is normal in size. Normal wall thickness. Normal wall motion. There is normal systolic function with a visually estimated ejection fraction of 60 - 65%. Grade I diastolic dysfunction.    Right Ventricle: The right ventricle is normal in size. Systolic function is normal.    Left Atrium: Severely dilated measuring 50 mL/m2 Agitated saline study of the atrial septum is positive, consistent with an intracardiac shunt at the atrial level.    Right Atrium: Right atrium is severely dilated.    Mitral Valve: There is mild regurgitation.    Tricuspid  Valve: There is mild regurgitation.    Pulmonary Artery: The estimated pulmonary artery systolic pressure is 32 mmHg.        RAMIRO:  No results found for this or any previous visit.    ASSESSMENT/PLAN:          Pre-op Assessment    I have reviewed the Patient Summary Reports.     I have reviewed the Nursing Notes. I have reviewed the NPO Status.   I have reviewed the Medications.     Review of Systems  Anesthesia Hx:  No problems with previous Anesthesia             Denies Family Hx of Anesthesia complications.    Denies Personal Hx of Anesthesia complications.                    Social:  Former Smoker, No Alcohol Use       Hematology/Oncology:       -- Anemia:                  Current/Recent Cancer. (colon adenocarcinoma)  --  Cancer in past history (HCC):                     Cardiovascular:     Denies Pacemaker. Hypertension   Denies MI. CAD    Denies CABG/stent.    CHF                                   Pulmonary:    Denies COPD.  Denies Asthma.     Denies Sleep Apnea.                Renal/:  Chronic Renal Disease, CKD                Hepatic/GI:      Denies GERD. Liver Disease, (cirrhosis)   Not Taking GLP-1 Agonists            Neurological:   CVA    Denies Seizures.                                Endocrine:  Diabetes, type 2 Denies Hypothyroidism.  Denies Hyperthyroidism.       Denies Obesity / BMI > 30, Denies Morbid Obesity / BMI > 40      Physical Exam  General: Well nourished, Cooperative, Alert and Oriented    Airway:  Mallampati: I   Mouth Opening: Normal  TM Distance: Normal  Tongue: Normal  Neck ROM: Normal ROM    Dental:  Edentulous    Chest/Lungs:  Clear to auscultation, Normal Respiratory Rate    Heart:  Rate: Normal  Rhythm: Irregularly Irregular        Anesthesia Plan  Type of Anesthesia, risks & benefits discussed:    Anesthesia Type: Gen ETT  Intra-op Monitoring Plan: Standard ASA Monitors and Art Line  Post Op Pain Control Plan: multimodal analgesia and IV/PO Opioids PRN  Induction:  IV  Airway  Plan: Video and Direct, Post-Induction  Informed Consent: Informed consent signed with the Patient and all parties understand the risks and agree with anesthesia plan.  All questions answered.   ASA Score: 3  Day of Surgery Review of History & Physical: H&P Update referred to the surgeon/provider.    Ready For Surgery From Anesthesia Perspective.     .           [1]   Patient Active Problem List  Diagnosis    History of primary malignant neoplasm of liver    Coagulopathy    PAD (peripheral artery disease)    HTN (hypertension)    DM (diabetes mellitus)    Pulmonary nodule    A-fib    GI bleed    Elevated brain natriuretic peptide (BNP) level    GIB (gastrointestinal bleeding)    ABLA (acute blood loss anemia)    Chronic diastolic congestive heart failure    Pulmonary hypertension    REYMUNDO (acute kidney injury)    Iron deficiency anemia due to chronic blood loss    Colon adenocarcinoma    BPH (benign prostatic hyperplasia)    CKD stage 3b, GFR 30-44 ml/min   [2] No current facility-administered medications for this encounter.    Current Outpatient Medications:     apixaban (ELIQUIS) 2.5 mg Tab, Take 1 tablet (2.5 mg total) by mouth 2 (two) times daily., Disp: 30 tablet, Rfl: 0    atorvastatin (LIPITOR) 20 MG tablet, Take 20 mg by mouth once daily., Disp: , Rfl:     glipiZIDE (GLUCOTROL) 10 MG TR24, Take 10 mg by mouth daily with breakfast., Disp: , Rfl:     albuterol (PROVENTIL/VENTOLIN HFA) 90 mcg/actuation inhaler, Inhale 2 puffs into the lungs every 4 (four) hours as needed for Shortness of Breath or Wheezing. (Patient not taking: Reported on 7/24/2025), Disp: , Rfl:     [Paused] amLODIPine (NORVASC) 10 MG tablet, Take 1 tablet (10 mg total) by mouth once daily. (Patient not taking: Reported on 7/24/2025), Disp: 90 tablet, Rfl: 3    cyanocobalamin, vitamin B-12, 1,000 mcg/mL Kit, Inject 1,000 mcg as directed every 30 days., Disp: , Rfl:     HYDROcodone-acetaminophen (NORCO) 7.5-325 mg per tablet, Take 0.5 tablets by  "mouth nightly as needed for Pain., Disp: 6 tablet, Rfl: 0    nitroGLYCERIN (NITROSTAT) 0.4 MG SL tablet, Place 0.4 mg under the tongue every 5 (five) minutes as needed for Chest pain., Disp: , Rfl:     pantoprazole (PROTONIX) 40 MG tablet, Take 40 mg by mouth once daily. (Patient not taking: Reported on 2025), Disp: , Rfl:     polyethylene glycol (GLYCOLAX) 17 gram/dose powder, Take 17 g by mouth daily as needed for Constipation. (Patient not taking: Reported on 2025), Disp: , Rfl:   [3]   Social History  Socioeconomic History    Marital status: Single   Tobacco Use    Smoking status: Former     Current packs/day: 0.00     Average packs/day: 1 pack/day for 52.0 years (52.0 ttl pk-yrs)     Types: Cigarettes     Start date:      Quit date:      Years since quittin.5    Smokeless tobacco: Never    Tobacco comments:     Pt is a former 1 pk/day cigarette smoker ("on and off") x 52 yrs. He states that he quit smoking in 2016 and has not relapsed. EMR updated to reflect "Former Smoker" status.   Substance and Sexual Activity    Alcohol use: No    Drug use: Yes     Comment: heroin-snort     Social Drivers of Health     Financial Resource Strain: Low Risk  (2025)    Overall Financial Resource Strain (CARDIA)     Difficulty of Paying Living Expenses: Not hard at all   Food Insecurity: Food Insecurity Present (2025)    Hunger Vital Sign     Worried About Running Out of Food in the Last Year: Often true     Ran Out of Food in the Last Year: Sometimes true   Transportation Needs: No Transportation Needs (2025)    PRAPARE - Transportation     Lack of Transportation (Medical): No     Lack of Transportation (Non-Medical): No   Physical Activity: Patient Declined (2025)    Exercise Vital Sign     Days of Exercise per Week: Patient declined     Minutes of Exercise per Session: Patient declined   Stress: Stress Concern Present (2025)    [employer] - " Occupational Stress Questionnaire     Feeling of Stress : Very much   Housing Stability: Low Risk  (5/29/2025)    Housing Stability Vital Sign     Unable to Pay for Housing in the Last Year: No     Number of Times Moved in the Last Year: 0     Homeless in the Last Year: No   Recent Concern: Housing Stability - High Risk (4/27/2025)    Housing Stability Vital Sign     Unable to Pay for Housing in the Last Year: Yes     Number of Times Moved in the Last Year: 0     Homeless in the Last Year: No

## 2025-07-28 ENCOUNTER — ANESTHESIA (OUTPATIENT)
Dept: SURGERY | Facility: HOSPITAL | Age: 75
End: 2025-07-28
Payer: COMMERCIAL

## 2025-07-28 ENCOUNTER — HOSPITAL ENCOUNTER (INPATIENT)
Facility: HOSPITAL | Age: 75
LOS: 3 days | Discharge: HOME | DRG: 330 | End: 2025-07-31
Attending: COLON & RECTAL SURGERY | Admitting: COLON & RECTAL SURGERY
Payer: COMMERCIAL

## 2025-07-28 DIAGNOSIS — R00.1 BRADYCARDIA: ICD-10-CM

## 2025-07-28 DIAGNOSIS — C18.9 COLON CANCER: ICD-10-CM

## 2025-07-28 DIAGNOSIS — C18.9 MALIGNANT NEOPLASM OF COLON, UNSPECIFIED PART OF COLON: Primary | ICD-10-CM

## 2025-07-28 DIAGNOSIS — C18.9 COLON ADENOCARCINOMA: ICD-10-CM

## 2025-07-28 LAB
INDIRECT COOMBS: NORMAL
POCT GLUCOSE: 137 MG/DL (ref 70–110)
POCT GLUCOSE: 151 MG/DL (ref 70–110)
POCT GLUCOSE: 155 MG/DL (ref 70–110)
RH BLD: NORMAL
SPECIMEN OUTDATE: NORMAL

## 2025-07-28 PROCEDURE — 37000008 HC ANESTHESIA 1ST 15 MINUTES: Performed by: COLON & RECTAL SURGERY

## 2025-07-28 PROCEDURE — 82962 GLUCOSE BLOOD TEST: CPT | Performed by: COLON & RECTAL SURGERY

## 2025-07-28 PROCEDURE — 25000003 PHARM REV CODE 250: Performed by: STUDENT IN AN ORGANIZED HEALTH CARE EDUCATION/TRAINING PROGRAM

## 2025-07-28 PROCEDURE — 93005 ELECTROCARDIOGRAM TRACING: CPT

## 2025-07-28 PROCEDURE — 63600175 PHARM REV CODE 636 W HCPCS: Performed by: COLON & RECTAL SURGERY

## 2025-07-28 PROCEDURE — 20600001 HC STEP DOWN PRIVATE ROOM

## 2025-07-28 PROCEDURE — 88342 IMHCHEM/IMCYTCHM 1ST ANTB: CPT | Mod: TC | Performed by: COLON & RECTAL SURGERY

## 2025-07-28 PROCEDURE — 93010 ELECTROCARDIOGRAM REPORT: CPT | Mod: ,,, | Performed by: INTERNAL MEDICINE

## 2025-07-28 PROCEDURE — 37000009 HC ANESTHESIA EA ADD 15 MINS: Performed by: COLON & RECTAL SURGERY

## 2025-07-28 PROCEDURE — 36000711: Performed by: COLON & RECTAL SURGERY

## 2025-07-28 PROCEDURE — 71000033 HC RECOVERY, INTIAL HOUR: Performed by: COLON & RECTAL SURGERY

## 2025-07-28 PROCEDURE — 86901 BLOOD TYPING SEROLOGIC RH(D): CPT | Performed by: NURSE PRACTITIONER

## 2025-07-28 PROCEDURE — 63600175 PHARM REV CODE 636 W HCPCS: Performed by: STUDENT IN AN ORGANIZED HEALTH CARE EDUCATION/TRAINING PROGRAM

## 2025-07-28 PROCEDURE — 63600175 PHARM REV CODE 636 W HCPCS

## 2025-07-28 PROCEDURE — 0DTF4ZZ RESECTION OF RIGHT LARGE INTESTINE, PERCUTANEOUS ENDOSCOPIC APPROACH: ICD-10-PCS | Performed by: COLON & RECTAL SURGERY

## 2025-07-28 PROCEDURE — 63600175 PHARM REV CODE 636 W HCPCS: Performed by: NURSE PRACTITIONER

## 2025-07-28 PROCEDURE — 71000015 HC POSTOP RECOV 1ST HR: Performed by: COLON & RECTAL SURGERY

## 2025-07-28 PROCEDURE — 27201423 OPTIME MED/SURG SUP & DEVICES STERILE SUPPLY: Performed by: COLON & RECTAL SURGERY

## 2025-07-28 PROCEDURE — 36000710: Performed by: COLON & RECTAL SURGERY

## 2025-07-28 PROCEDURE — 25000003 PHARM REV CODE 250

## 2025-07-28 PROCEDURE — 25000003 PHARM REV CODE 250: Performed by: NURSE PRACTITIONER

## 2025-07-28 PROCEDURE — 27201037 HC PRESSURE MONITORING SET UP

## 2025-07-28 PROCEDURE — 44205 LAP COLECTOMY PART W/ILEUM: CPT | Mod: ,,, | Performed by: COLON & RECTAL SURGERY

## 2025-07-28 PROCEDURE — 71000016 HC POSTOP RECOV ADDL HR: Performed by: COLON & RECTAL SURGERY

## 2025-07-28 RX ORDER — HYDROMORPHONE HYDROCHLORIDE 1 MG/ML
0.2 INJECTION, SOLUTION INTRAMUSCULAR; INTRAVENOUS; SUBCUTANEOUS EVERY 5 MIN PRN
Refills: 0 | Status: DISCONTINUED | OUTPATIENT
Start: 2025-07-28 | End: 2025-07-28 | Stop reason: HOSPADM

## 2025-07-28 RX ORDER — ALBUTEROL SULFATE 90 UG/1
2 INHALANT RESPIRATORY (INHALATION) EVERY 4 HOURS PRN
Status: DISCONTINUED | OUTPATIENT
Start: 2025-07-28 | End: 2025-07-31 | Stop reason: HOSPADM

## 2025-07-28 RX ORDER — ACETAMINOPHEN 500 MG
1000 TABLET ORAL EVERY 8 HOURS
Status: DISCONTINUED | OUTPATIENT
Start: 2025-07-29 | End: 2025-07-30

## 2025-07-28 RX ORDER — ALVIMOPAN 12 MG/1
12 CAPSULE ORAL ONCE
Status: DISCONTINUED | OUTPATIENT
Start: 2025-07-28 | End: 2025-07-28

## 2025-07-28 RX ORDER — TRIPROLIDINE/PSEUDOEPHEDRINE 2.5MG-60MG
600 TABLET ORAL
Status: COMPLETED | OUTPATIENT
Start: 2025-07-28 | End: 2025-07-28

## 2025-07-28 RX ORDER — SODIUM CHLORIDE 0.9 % (FLUSH) 0.9 %
10 SYRINGE (ML) INJECTION
Status: DISCONTINUED | OUTPATIENT
Start: 2025-07-28 | End: 2025-07-31 | Stop reason: HOSPADM

## 2025-07-28 RX ORDER — BUPIVACAINE HYDROCHLORIDE 2.5 MG/ML
INJECTION, SOLUTION EPIDURAL; INFILTRATION; INTRACAUDAL; PERINEURAL
Status: DISCONTINUED | OUTPATIENT
Start: 2025-07-28 | End: 2025-07-28 | Stop reason: HOSPADM

## 2025-07-28 RX ORDER — GLUCAGON 1 MG
1 KIT INJECTION
Status: DISCONTINUED | OUTPATIENT
Start: 2025-07-28 | End: 2025-07-31 | Stop reason: HOSPADM

## 2025-07-28 RX ORDER — ONDANSETRON HYDROCHLORIDE 2 MG/ML
4 INJECTION, SOLUTION INTRAVENOUS EVERY 12 HOURS PRN
Status: DISCONTINUED | OUTPATIENT
Start: 2025-07-28 | End: 2025-07-31 | Stop reason: HOSPADM

## 2025-07-28 RX ORDER — FENTANYL CITRATE 50 UG/ML
25 INJECTION, SOLUTION INTRAMUSCULAR; INTRAVENOUS EVERY 5 MIN PRN
Status: COMPLETED | OUTPATIENT
Start: 2025-07-28 | End: 2025-07-28

## 2025-07-28 RX ORDER — SODIUM CHLORIDE 9 MG/ML
INJECTION, SOLUTION INTRAVENOUS CONTINUOUS
Status: DISCONTINUED | OUTPATIENT
Start: 2025-07-28 | End: 2025-07-29

## 2025-07-28 RX ORDER — OXYCODONE HYDROCHLORIDE 5 MG/1
5 TABLET ORAL EVERY 4 HOURS PRN
Status: DISCONTINUED | OUTPATIENT
Start: 2025-07-28 | End: 2025-07-31 | Stop reason: HOSPADM

## 2025-07-28 RX ORDER — IBUPROFEN 200 MG
16 TABLET ORAL
Status: DISCONTINUED | OUTPATIENT
Start: 2025-07-28 | End: 2025-07-31 | Stop reason: HOSPADM

## 2025-07-28 RX ORDER — HEPARIN SODIUM 5000 [USP'U]/ML
5000 INJECTION, SOLUTION INTRAVENOUS; SUBCUTANEOUS ONCE
Status: COMPLETED | OUTPATIENT
Start: 2025-07-28 | End: 2025-07-28

## 2025-07-28 RX ORDER — SODIUM CHLORIDE 0.9 % (FLUSH) 0.9 %
10 SYRINGE (ML) INJECTION
Status: DISCONTINUED | OUTPATIENT
Start: 2025-07-28 | End: 2025-07-28 | Stop reason: HOSPADM

## 2025-07-28 RX ORDER — DEXAMETHASONE SODIUM PHOSPHATE 4 MG/ML
INJECTION, SOLUTION INTRA-ARTICULAR; INTRALESIONAL; INTRAMUSCULAR; INTRAVENOUS; SOFT TISSUE
Status: DISCONTINUED | OUTPATIENT
Start: 2025-07-28 | End: 2025-07-28

## 2025-07-28 RX ORDER — LIDOCAINE HYDROCHLORIDE 20 MG/ML
INJECTION, SOLUTION EPIDURAL; INFILTRATION; INTRACAUDAL; PERINEURAL
Status: DISCONTINUED | OUTPATIENT
Start: 2025-07-28 | End: 2025-07-28

## 2025-07-28 RX ORDER — GABAPENTIN 300 MG/1
300 CAPSULE ORAL 3 TIMES DAILY
Status: DISCONTINUED | OUTPATIENT
Start: 2025-07-28 | End: 2025-07-28

## 2025-07-28 RX ORDER — FENTANYL CITRATE 50 UG/ML
INJECTION, SOLUTION INTRAMUSCULAR; INTRAVENOUS
Status: DISCONTINUED | OUTPATIENT
Start: 2025-07-28 | End: 2025-07-28

## 2025-07-28 RX ORDER — HALOPERIDOL LACTATE 5 MG/ML
0.5 INJECTION, SOLUTION INTRAMUSCULAR EVERY 10 MIN PRN
Status: DISCONTINUED | OUTPATIENT
Start: 2025-07-28 | End: 2025-07-28 | Stop reason: HOSPADM

## 2025-07-28 RX ORDER — GLUCAGON 1 MG
1 KIT INJECTION
Status: DISCONTINUED | OUTPATIENT
Start: 2025-07-28 | End: 2025-07-28 | Stop reason: HOSPADM

## 2025-07-28 RX ORDER — GABAPENTIN 100 MG/1
100 CAPSULE ORAL 3 TIMES DAILY
Status: DISCONTINUED | OUTPATIENT
Start: 2025-07-28 | End: 2025-07-31 | Stop reason: HOSPADM

## 2025-07-28 RX ORDER — METRONIDAZOLE 500 MG/100ML
500 INJECTION, SOLUTION INTRAVENOUS
Status: COMPLETED | OUTPATIENT
Start: 2025-07-28 | End: 2025-07-28

## 2025-07-28 RX ORDER — LIDOCAINE HYDROCHLORIDE 10 MG/ML
1 INJECTION, SOLUTION EPIDURAL; INFILTRATION; INTRACAUDAL; PERINEURAL
Status: DISCONTINUED | OUTPATIENT
Start: 2025-07-28 | End: 2025-07-28

## 2025-07-28 RX ORDER — NITROGLYCERIN 0.4 MG/1
0.4 TABLET SUBLINGUAL EVERY 5 MIN PRN
Status: DISCONTINUED | OUTPATIENT
Start: 2025-07-28 | End: 2025-07-31 | Stop reason: HOSPADM

## 2025-07-28 RX ORDER — MUPIROCIN 20 MG/G
1 OINTMENT TOPICAL
Status: COMPLETED | OUTPATIENT
Start: 2025-07-28 | End: 2025-07-28

## 2025-07-28 RX ORDER — ACETAMINOPHEN 500 MG
1000 TABLET ORAL
Status: DISCONTINUED | OUTPATIENT
Start: 2025-07-28 | End: 2025-07-28

## 2025-07-28 RX ORDER — HYDROMORPHONE HYDROCHLORIDE 1 MG/ML
0.5 INJECTION, SOLUTION INTRAMUSCULAR; INTRAVENOUS; SUBCUTANEOUS EVERY 6 HOURS PRN
Status: DISCONTINUED | OUTPATIENT
Start: 2025-07-28 | End: 2025-07-31 | Stop reason: HOSPADM

## 2025-07-28 RX ORDER — GABAPENTIN 300 MG/1
300 CAPSULE ORAL
Status: COMPLETED | OUTPATIENT
Start: 2025-07-28 | End: 2025-07-28

## 2025-07-28 RX ORDER — DEXMEDETOMIDINE HYDROCHLORIDE 100 UG/ML
INJECTION, SOLUTION INTRAVENOUS
Status: DISCONTINUED | OUTPATIENT
Start: 2025-07-28 | End: 2025-07-28

## 2025-07-28 RX ORDER — ROCURONIUM BROMIDE 10 MG/ML
INJECTION, SOLUTION INTRAVENOUS
Status: DISCONTINUED | OUTPATIENT
Start: 2025-07-28 | End: 2025-07-28

## 2025-07-28 RX ORDER — PROPOFOL 10 MG/ML
VIAL (ML) INTRAVENOUS
Status: DISCONTINUED | OUTPATIENT
Start: 2025-07-28 | End: 2025-07-28

## 2025-07-28 RX ORDER — INSULIN ASPART 100 [IU]/ML
1-10 INJECTION, SOLUTION INTRAVENOUS; SUBCUTANEOUS
Status: DISCONTINUED | OUTPATIENT
Start: 2025-07-28 | End: 2025-07-31 | Stop reason: HOSPADM

## 2025-07-28 RX ORDER — OXYCODONE HYDROCHLORIDE 10 MG/1
10 TABLET ORAL EVERY 4 HOURS PRN
Status: DISCONTINUED | OUTPATIENT
Start: 2025-07-28 | End: 2025-07-31 | Stop reason: HOSPADM

## 2025-07-28 RX ORDER — CEFTRIAXONE 2 G/1
2 INJECTION, POWDER, FOR SOLUTION INTRAMUSCULAR; INTRAVENOUS
Status: DISCONTINUED | OUTPATIENT
Start: 2025-07-28 | End: 2025-07-28

## 2025-07-28 RX ORDER — PANTOPRAZOLE SODIUM 40 MG/1
40 TABLET, DELAYED RELEASE ORAL DAILY
Status: DISCONTINUED | OUTPATIENT
Start: 2025-07-28 | End: 2025-07-31 | Stop reason: HOSPADM

## 2025-07-28 RX ORDER — ACETAMINOPHEN 10 MG/ML
1000 INJECTION, SOLUTION INTRAVENOUS EVERY 8 HOURS
Status: COMPLETED | OUTPATIENT
Start: 2025-07-28 | End: 2025-07-29

## 2025-07-28 RX ORDER — METRONIDAZOLE 500 MG/100ML
INJECTION, SOLUTION INTRAVENOUS
Status: DISCONTINUED | OUTPATIENT
Start: 2025-07-28 | End: 2025-07-28

## 2025-07-28 RX ORDER — ALVIMOPAN 12 MG/1
12 CAPSULE ORAL 2 TIMES DAILY
Status: DISCONTINUED | OUTPATIENT
Start: 2025-07-28 | End: 2025-07-30

## 2025-07-28 RX ORDER — ONDANSETRON HYDROCHLORIDE 2 MG/ML
INJECTION, SOLUTION INTRAVENOUS
Status: DISCONTINUED | OUTPATIENT
Start: 2025-07-28 | End: 2025-07-28

## 2025-07-28 RX ORDER — IBUPROFEN 200 MG
24 TABLET ORAL
Status: DISCONTINUED | OUTPATIENT
Start: 2025-07-28 | End: 2025-07-31 | Stop reason: HOSPADM

## 2025-07-28 RX ORDER — PROCHLORPERAZINE EDISYLATE 5 MG/ML
5 INJECTION INTRAMUSCULAR; INTRAVENOUS EVERY 30 MIN PRN
Status: DISCONTINUED | OUTPATIENT
Start: 2025-07-28 | End: 2025-07-28 | Stop reason: HOSPADM

## 2025-07-28 RX ORDER — PHENYLEPHRINE HCL IN 0.9% NACL 1 MG/10 ML
SYRINGE (ML) INTRAVENOUS
Status: DISCONTINUED | OUTPATIENT
Start: 2025-07-28 | End: 2025-07-28

## 2025-07-28 RX ORDER — KETAMINE HCL IN 0.9 % NACL 50 MG/5 ML
SYRINGE (ML) INTRAVENOUS
Status: DISCONTINUED | OUTPATIENT
Start: 2025-07-28 | End: 2025-07-28

## 2025-07-28 RX ORDER — CEFTRIAXONE 1 G/1
INJECTION, POWDER, FOR SOLUTION INTRAMUSCULAR; INTRAVENOUS
Status: DISCONTINUED | OUTPATIENT
Start: 2025-07-28 | End: 2025-07-28

## 2025-07-28 RX ORDER — TRIAMCINOLONE ACETONIDE 40 MG/ML
INJECTION, SUSPENSION INTRA-ARTICULAR; INTRAMUSCULAR
Status: DISCONTINUED | OUTPATIENT
Start: 2025-07-28 | End: 2025-07-28 | Stop reason: HOSPADM

## 2025-07-28 RX ORDER — MUPIROCIN 20 MG/G
OINTMENT TOPICAL 2 TIMES DAILY
Status: COMPLETED | OUTPATIENT
Start: 2025-07-28 | End: 2025-07-30

## 2025-07-28 RX ORDER — SODIUM CHLORIDE 9 MG/ML
INJECTION, SOLUTION INTRAVENOUS
Status: COMPLETED | OUTPATIENT
Start: 2025-07-28 | End: 2025-07-28

## 2025-07-28 RX ORDER — ATORVASTATIN CALCIUM 20 MG/1
20 TABLET, FILM COATED ORAL DAILY
Status: DISCONTINUED | OUTPATIENT
Start: 2025-07-28 | End: 2025-07-31 | Stop reason: HOSPADM

## 2025-07-28 RX ORDER — ACETAMINOPHEN 650 MG/20.3ML
975 LIQUID ORAL
Status: COMPLETED | OUTPATIENT
Start: 2025-07-28 | End: 2025-07-28

## 2025-07-28 RX ADMIN — FENTANYL CITRATE 25 MCG: 50 INJECTION, SOLUTION INTRAMUSCULAR; INTRAVENOUS at 12:07

## 2025-07-28 RX ADMIN — SODIUM CHLORIDE: 9 INJECTION, SOLUTION INTRAVENOUS at 08:07

## 2025-07-28 RX ADMIN — DEXMEDETOMIDINE 4 MCG: 100 INJECTION, SOLUTION, CONCENTRATE INTRAVENOUS at 11:07

## 2025-07-28 RX ADMIN — HYDROMORPHONE HYDROCHLORIDE 0.2 MG: 1 INJECTION, SOLUTION INTRAMUSCULAR; INTRAVENOUS; SUBCUTANEOUS at 01:07

## 2025-07-28 RX ADMIN — GABAPENTIN 300 MG: 300 CAPSULE ORAL at 08:07

## 2025-07-28 RX ADMIN — ALVIMOPAN 12 MG: 12 CAPSULE ORAL at 02:07

## 2025-07-28 RX ADMIN — OXYCODONE HYDROCHLORIDE 10 MG: 10 TABLET ORAL at 12:07

## 2025-07-28 RX ADMIN — ACETAMINOPHEN 1000 MG: 10 INJECTION INTRAVENOUS at 09:07

## 2025-07-28 RX ADMIN — FENTANYL CITRATE 25 MCG: 50 INJECTION, SOLUTION INTRAMUSCULAR; INTRAVENOUS at 10:07

## 2025-07-28 RX ADMIN — DEXMEDETOMIDINE 4 MCG: 100 INJECTION, SOLUTION, CONCENTRATE INTRAVENOUS at 10:07

## 2025-07-28 RX ADMIN — ATORVASTATIN CALCIUM 20 MG: 20 TABLET, FILM COATED ORAL at 12:07

## 2025-07-28 RX ADMIN — METRONIDAZOLE 500 MG: 5 INJECTION, SOLUTION INTRAVENOUS at 09:07

## 2025-07-28 RX ADMIN — SODIUM CHLORIDE: 0.9 INJECTION, SOLUTION INTRAVENOUS at 08:07

## 2025-07-28 RX ADMIN — INSULIN ASPART 2 UNITS: 100 INJECTION, SOLUTION INTRAVENOUS; SUBCUTANEOUS at 03:07

## 2025-07-28 RX ADMIN — ONDANSETRON 4 MG: 2 INJECTION INTRAMUSCULAR; INTRAVENOUS at 10:07

## 2025-07-28 RX ADMIN — SODIUM CHLORIDE: 9 INJECTION, SOLUTION INTRAVENOUS at 02:07

## 2025-07-28 RX ADMIN — ROCURONIUM BROMIDE 80 MG: 10 INJECTION, SOLUTION INTRAVENOUS at 08:07

## 2025-07-28 RX ADMIN — CEFTRIAXONE 2 G: 1 INJECTION, POWDER, FOR SOLUTION INTRAMUSCULAR; INTRAVENOUS at 09:07

## 2025-07-28 RX ADMIN — GLYCOPYRROLATE 0.2 MG: 0.2 INJECTION, SOLUTION INTRAMUSCULAR; INTRAVENOUS at 09:07

## 2025-07-28 RX ADMIN — ROCURONIUM BROMIDE 20 MG: 10 INJECTION, SOLUTION INTRAVENOUS at 10:07

## 2025-07-28 RX ADMIN — METRONIDAZOLE 500 MG: 5 INJECTION, SOLUTION INTRAVENOUS at 08:07

## 2025-07-28 RX ADMIN — Medication 10 MG: at 09:07

## 2025-07-28 RX ADMIN — GABAPENTIN 100 MG: 100 CAPSULE ORAL at 08:07

## 2025-07-28 RX ADMIN — HEPARIN SODIUM 5000 UNITS: 5000 INJECTION INTRAVENOUS; SUBCUTANEOUS at 08:07

## 2025-07-28 RX ADMIN — HYDROMORPHONE HYDROCHLORIDE 0.5 MG: 1 INJECTION, SOLUTION INTRAMUSCULAR; INTRAVENOUS; SUBCUTANEOUS at 02:07

## 2025-07-28 RX ADMIN — IBUPROFEN 600 MG: 100 SUSPENSION ORAL at 08:07

## 2025-07-28 RX ADMIN — ACETAMINOPHEN 976.6 MG: 650 SOLUTION ORAL at 08:07

## 2025-07-28 RX ADMIN — SODIUM CHLORIDE: 9 INJECTION, SOLUTION INTRAVENOUS at 11:07

## 2025-07-28 RX ADMIN — ROCURONIUM BROMIDE 20 MG: 10 INJECTION, SOLUTION INTRAVENOUS at 09:07

## 2025-07-28 RX ADMIN — PANTOPRAZOLE SODIUM 40 MG: 40 TABLET, DELAYED RELEASE ORAL at 12:07

## 2025-07-28 RX ADMIN — SODIUM CHLORIDE, SODIUM GLUCONATE, SODIUM ACETATE, POTASSIUM CHLORIDE, MAGNESIUM CHLORIDE, SODIUM PHOSPHATE, DIBASIC, AND POTASSIUM PHOSPHATE: .53; .5; .37; .037; .03; .012; .00082 INJECTION, SOLUTION INTRAVENOUS at 09:07

## 2025-07-28 RX ADMIN — SUGAMMADEX 400 MG: 100 INJECTION, SOLUTION INTRAVENOUS at 11:07

## 2025-07-28 RX ADMIN — ACETAMINOPHEN 1000 MG: 10 INJECTION INTRAVENOUS at 01:07

## 2025-07-28 RX ADMIN — Medication 100 MCG: at 10:07

## 2025-07-28 RX ADMIN — PROPOFOL 180 MG: 10 INJECTION, EMULSION INTRAVENOUS at 09:07

## 2025-07-28 RX ADMIN — MUPIROCIN: 20 OINTMENT TOPICAL at 08:07

## 2025-07-28 RX ADMIN — GABAPENTIN 100 MG: 100 CAPSULE ORAL at 02:07

## 2025-07-28 RX ADMIN — PROPOFOL 30 MG: 10 INJECTION, EMULSION INTRAVENOUS at 11:07

## 2025-07-28 RX ADMIN — LIDOCAINE HYDROCHLORIDE 80 MG: 20 INJECTION, SOLUTION EPIDURAL; INFILTRATION; INTRACAUDAL; PERINEURAL at 09:07

## 2025-07-28 RX ADMIN — MUPIROCIN 1 G: 20 OINTMENT TOPICAL at 08:07

## 2025-07-28 RX ADMIN — Medication 200 MCG: at 10:07

## 2025-07-28 RX ADMIN — DEXAMETHASONE SODIUM PHOSPHATE 8 MG: 4 INJECTION, SOLUTION INTRAMUSCULAR; INTRAVENOUS at 09:07

## 2025-07-28 NOTE — TRANSFER OF CARE
"Anesthesia Transfer of Care Note    Patient: Oneil Meza    Procedure(s) Performed: Procedure(s) (LRB):  COLECTOMY, RIGHT, LAPAROSCOPIC, ERAS high (N/A)    Patient location: PACU    Anesthesia Type: general    Transport from OR: Transported from OR on 6-10 L/min O2 by face mask with adequate spontaneous ventilation    Post pain: adequate analgesia    Post assessment: no apparent anesthetic complications and tolerated procedure well    Post vital signs: stable    Level of consciousness: responds to stimulation    Nausea/Vomiting: no nausea/vomiting    Complications: none    Transfer of care protocol was followed      Last vitals: Visit Vitals  /63   Pulse (!) 52   Temp 36.3 °C (97.3 °F) (Temporal)   Resp (!) 21   Ht 5' 8" (1.727 m)   Wt 72.1 kg (158 lb 15.2 oz)   SpO2 100%   BMI 24.17 kg/m²     "

## 2025-07-28 NOTE — HOSPITAL COURSE
74 y.o. male with a past surgical history of liver resection and R fem-pop bypass presents today with a right-sided colon cancer for elective resection.   7/28: PROCEDURE PERFORMED: Laparoscopic right colectomy   7/29: AROBF - little gas, due to void s/p ricks discontinued. Denies n/v, tolerating PO. Decent pain control. Ambulating.   7/30: No BM, +gas, +voids spontaneously. Denies n/v, tolerating PO. Decent pain control. Ambulating.   7/31: Ambulating, tolerating diet without nausea or vomiting, voids spontaneously, pain controlled on PO medications, having bowel movements, passing flatus, hemodynamically stable, afebrile, surgical incisions healing well without signs of infection. Follow-up arranged. Discharge home.

## 2025-07-28 NOTE — OP NOTE
ONEIL HUA  [id number]  [id number]    OPERATIVE NOTE:    DATE OF OPERATION: 07/28/2025    PREOPERATIVE DIAGNOSIS:  Hepatic flexure cancer    POSTOPERATIVE DIAGNOSIS:  Hepatic flexure cancer    PROCEDURE PERFORMED: Laparoscopic right colectomy    ATTENDING SURGEON: RINA Calderon MD    ASSISTING SURGEON:  Navjot Gonzáles MD    ANESTHESIA:  General    ESTIMATED BLOOD LOSS:  50 mL    FINDINGS:  1. Multiple adhesions from his prior liver resection.  Tattoo in the hepatic flexure.  High ligation of the ileocolic pedicle in the right branch of the middle colic artery.  Isoperistaltic side-to-side ileocolonic anastomosis      Colon Resection  Operation performed with curative intent Yes   Tumor Location Hepatic flexure   Extent of colon and vascular resection Right hemicolectomy - ileocolic, right colic (if present)        SPECIMENS:  Right colon    COMPLICATIONS:  None apparent    DRAINS: none    DISPOSITION: PACU    CONDITION: good    INDICATION FOR PROCEDURE:  Oneil Hua is a 74 y.o. male with a history of liver resection who presents with a right-sided colon cancer for elective resection.    DESCRIPTION OF PROCEDURE:    After informed consent was reviewed, the patient was taken to the Operating Room and placed under general anesthesia.  A Espana catheter was sterilely inserted.  Preoperative antibiotics with ceftriaxone and Flagyl were given.  The arms were tucked and the anterior abdominal wall was prepped and draped in the usual sterile fashion.  A time out was perfomed.     The abdomen was entered through a 5 mm left subcostal incision.  Veress needle was inserted and pneumoperitoneum was achieved.  A 5 mm port and camera were then inserted at the left upper quadrant.   The underlying bowel and vasculature were inspected and found to be free from any iatrogenic injury.  Diagnostic laparoscopy was then performed.  Large amount of adhesed omentum to the anterior abdominal wall from his prior upper midline  incision.  There were no signs of any metastatic disease.  The liver was nodular consistent with his cirrhosis.  The peritoneum was clear.   Three 5 mm trocars were then inserted with 1 in the umbilicus, 1 in the suprapubic region, and 1 in the left lower quadrant.  Adhesions to the anterior abdominal wall were taken down.  The omentum was then placed over the transverse colon.  The ileocolic pedicle was grasped and elevated.    A medial to lateral dissection was then performed.  The avascular plane was then developed just posterior to the ileocolic pedicle above the retroperitoneum.  The  duodenum was seen and protected.  A window was made on the duodenal side of the ileocolic pedicle and high ligation of the ileocolic artery was performed.  The pedicle was then grasped and further medial to lateral dissection was performed.  The mesentery of the terminal ileum was mobilized up to the 3rd portion the duodenum.  The retroperitoneum was maintained.  Lateral attachments were taken down using the LigaSure. The lateral peritoneal attachments were taken up to the hepatic flexure.  The patient was then placed into reverse Trendelenburg position.  The omentum was taken off the transverse colon.  Dissection continued over to take down the hepatic flexure attachments with the LigaSure.  Care was taken to avoid injury to the underlying duodenum as well as to the gallbladder.  Omental attachments to the prior liver resection were left in place.  With the right colon mobilized as a midline structure, a 6 cm upper midline extraction site was then made through the previous upper midline incision.  His prior keloid was resected. A medium Agustin was then placed.  The right colon was then delivered through the medium Agustin.  The terminal ileum elevated.  Approximately 15 cm from the ileocecal valve, a window was made in the terminal ileum mesentery.  The mesentery of the terminal ileum was divided using the Ligasure to connect to  the prior mesenteric dissection at the ileocolic vessel.  The right branch of the middle colic artery was isolated and was divided.  The colon was isolated at the level of the left branch of the middle colic artery. The intervening mesentery was divided with the LigaSure.    The ileum was divided with a MARJORIE 75 mm stapler with a blue load.  The staple line was imbricated with 3-0 Vicryl Lembert sutures.  The transverse colon was divided with a MARJORIE 75 mm stapler and the staple line was also imbricated with interrupted 3-0 Vicryl sutures.  The specimen was removed from the field.  A side-to-side isoperistaltic anastomosis was then created.  2 cm proximal to the staple line on the ileum, an enterotomy was made.  8 cm distal to the staple line of the colon, a colotomy was made.  A MARJORIE 75 mm stapler was inserted and fired to form a common channel.  The common channel was inspected.  The common enterotomy was then closed with a two-layer closure with a running 3-0 V lock suture.    The anastomosis was then placed back into the abdomen.  The Agustin was occluded and the abdomen was reinspected.  Hemostasis was noted.  The omentum was grasped and placed over the anastomosis.  The two 5 mm trocars in the left side removed under direct visualization.     All members of the team then changed gowns and gloves for clean closure.  The upper midline was closed with a running 0 PDS with interrupted 0 PDS internal retention sutures.  All skin and subcutaneous tissues were irrigated.  All skin incisions were closed with 4-0 Vicryl in subcuticular fashion and Steri-Strips were applied. The upper midline incision had 5 mL of Kenalog 40 injected as the subcutaneous layer to help prevent keloid formation.   The patient tolerated the procedure well.  There were no apparent intraoperative complications.  All sponge, needle, and instrument counts were correct x2.  The patient was extubated and taken to PACU in stable condition.    RINA Aburto  MD Yumiko, FACS, FASCRS  Staff Surgeon  Colon & Rectal Surgery

## 2025-07-28 NOTE — PLAN OF CARE
"Pt declined pre op call to his family, however states "OK" to call his brother Carlos post op. No family currently present in waiting area.  Pt belongings taken to post op lockers, Call light in reach, stretcher low and locked, pt provided with warm blanket, pt denies needs. Bedside report given to OR circulatorJewels.  "

## 2025-07-28 NOTE — NURSING TRANSFER
Nursing Transfer Note      7/28/2025   1:49 PM    Nurse giving handoff:Radha  Nurse receiving handoff:Bryce    Reason patient is being transferred: admit    Transfer To: 1005    Transfer via bed    Transfer with cardiac monitoring    Transported by tech    Telemetry: Box Number [id number]  Order for Tele Monitor? Yes    Additional Lines: Espana Catheter    Medicines sent: na    Any special needs or follow-up needed: na    Patient belongings transferred with patient: Yes    Chart send with patient: Yes    Notified: friend

## 2025-07-28 NOTE — ANESTHESIA PROCEDURE NOTES
Intubation    Date/Time: 7/28/2025 9:05 AM    Performed by: Sendy Sullivan MD  Authorized by: Tra Christiansen MD    Intubation:     Induction:  Intravenous    Intubated:  Postinduction    Mask Ventilation:  Easy with oral airway    Attempts:  2    Attempted By:  Resident anesthesiologist    Method of Intubation:  Direct    Blade:  Tata 3    Laryngeal View Grade: Grade III - only epiglottis visible      Attempted By (2nd Attempt):  Resident anesthesiologist    Method of Intubation (2nd Attempt):  Video laryngoscopy    Blade (2nd Attempt):  Jacome 3    Laryngeal View Grade (2nd Attempt): Grade I - full view of cords      Difficult Airway Encountered?: No      Complications:  None    Airway Device:  Oral endotracheal tube    Airway Device Size:  7.5    Style/Cuff Inflation:  Cuffed (inflated to minimal occlusive pressure)    Tube secured:  22    Secured at:  The lips    Placement Verified By:  Capnometry    DIFFICULT INTUBATION DESCRIPTOR: deep larynx.    Findings Post-Intubation:  BS equal bilateral and atraumatic/condition of teeth unchanged

## 2025-07-28 NOTE — ANESTHESIA POSTPROCEDURE EVALUATION
Anesthesia Post Evaluation    Patient: Oneil Meza    Procedure(s) Performed: Procedure(s) (LRB):  COLECTOMY, RIGHT, LAPAROSCOPIC, ERAS high (N/A)    Final Anesthesia Type: general      Patient location during evaluation: PACU  Patient participation: Yes- Able to Participate  Level of consciousness: awake and alert  Post-procedure vital signs: reviewed and stable  Pain management: adequate  Airway patency: patent    PONV status at discharge: No PONV  Anesthetic complications: no      Cardiovascular status: blood pressure returned to baseline and hemodynamically stable  Respiratory status: spontaneous ventilation  Hydration status: euvolemic  Follow-up not needed.              Vitals Value Taken Time   /69 07/28/25 13:46   Temp 36.3 °C (97.3 °F) 07/28/25 11:38   Pulse 43 07/28/25 14:16   Resp 17 07/28/25 13:49   SpO2 95 % 07/28/25 14:16   Vitals shown include unfiled device data.      Event Time   Out of Recovery 12:04:00         Pain/Vel Score: Pain Rating Prior to Med Admin: 7 (7/28/2025  1:20 PM)  Vel Score: 10 (7/28/2025 12:30 PM)

## 2025-07-28 NOTE — BRIEF OP NOTE
[facility] - Surgery ([facility])  Brief Operative Note    SUMMARY     Surgery Date: 7/28/2025     Surgeons and Role:     * RINA Calderon MD - Primary     * Darwin Gonzáles MD - Fellow    Assisting Surgeon: None    Pre-op Diagnosis:  Colon adenocarcinoma [C18.9]    Post-op Diagnosis:  Post-Op Diagnosis Codes:     * Colon adenocarcinoma [C18.9]    Procedure(s) (LRB):  COLECTOMY, RIGHT, LAPAROSCOPIC, ERAS high (N/A)    Anesthesia: General    Implants:  * No implants in log *    Operative Findings: Lap right colectomy     Estimated Blood Loss: * No values recorded between 7/28/2025  8:53 AM and 7/28/2025 11:23 AM *    Estimated Blood Loss has not been documented. EBL = 25cc.         Specimens:   Specimen (24h ago, onward)       Start     Ordered    07/28/25 1036  Specimen to Pathology Other (CRS)  RELEASE UPON ORDERING        References:    Click here for ordering Quick Tip   Question:  Release to patient  Answer:  Immediate    07/28/25 1036                  ID Type Source Tests Collected by Time Destination   1 : Right colon Tissue Large intestine, Colon SPECIMEN TO PATHOLOGY RINA Calderon MD 7/28/2025 1035        [id number]

## 2025-07-28 NOTE — PLAN OF CARE
Problem: Adult Inpatient Plan of Care  Goal: Plan of Care Review  Outcome: Progressing  Goal: Patient-Specific Goal (Individualized)  Outcome: Progressing  Goal: Absence of Hospital-Acquired Illness or Injury  Outcome: Progressing  Goal: Optimal Comfort and Wellbeing  Outcome: Progressing  Goal: Readiness for Transition of Care  Outcome: Progressing   GISSU Plan of Care Note  Dx Colon adenocarcinoma    Shift Events transferred from PACU, pt in A-fib on Tele with HR fluctuating bw 40s-50 sometimes dropped to 35 but not sustained- pt said he feels fine - no dizzy/no SOB,  and NP Shelli awared of pt HR- ordered to continue to monitor. Pain is under control, denied nausea    Goals of Care: VS and lab WDL; able to ambulate; pain control, diet advance; pass flatus    Neuro: AAOx4    Vital Signs: stable    Respiratory: RA    Diet: clear liquid    Is patient tolerating current diet? yes    GTTS: NS at 40    Urine Output/Bowel Movement: adequate amount of urine per ricks    Lines: 2 PIVs      Accuchecks:ac/hs    Skin: midline ABD with telfa dressing- dry drainage marked+ 4 lap sites with steri-strip CDI    Activity level? Has not got OOB, pt has been resting in bed    Any scheduled procedures? none    Any safety concerns? Fall risk precaution

## 2025-07-28 NOTE — HPI
75 yo M with PMH of DM2 (last HgA1c 5.7 on 7/23/25), CVA (2015), paroxysmal Afib (Eliquis - low dose 2/2/ GIB), chronic pulmonary nodule, CKD3, HCC s/p resection (2023), cirrhosis, HTN, CAD, PAD s/p R fem-pop bypass with L SFA stent (2022), CHF (EF 60-65%), pulm HTN (32 mmHg on TTE 4/2025) presents today for laparoscopic right colectomy d/t hepatic flexure cancer.   Course is as follows:   CT abdomen pelvis 05/29/2025:  - Cirrhosis and mild splenomegaly.               - Postoperative changes from left partial hepatectomy for HCC  05/30/2025: Colonoscopy performed secondary to iron-deficiency anemia.   Impression:- Preparation of the colon was poor.                       - Malignant tumor at the hepatic flexure. Biopsied. Tattooed. (location suspected hepatic flexure, given looping, tattoo was placed just distally to ensure marking)                       - Multiple small polyps in the sigmoid colon, in the transverse colon and in the ascending colon. Resection not attempted.                       - The examination was otherwise normal on direct and retroflexion views.   Pathology: Fragments of tubular adenoma with at least high-grade dysplasia/intramucosal carcinoma, focally suspicious for invasion.   6/23/25 CEA 3.9  7/10/25 PET: Impression:  Subtle hypermetabolic focus in the colon at the hepatic flexure with no CT correlate, possibly correlating with biopsy-proven adenocarcinoma.   No other remote hypermetabolic foci.  Left upper lobe subpleural nodule with low level tracer uptake below that of the blood pool, similar in size compared to 04/08/2022.

## 2025-07-29 PROBLEM — D12.6 HIGH GRADE DYSPLASIA IN COLONIC ADENOMA: Status: ACTIVE | Noted: 2025-07-29

## 2025-07-29 PROBLEM — D50.9 IRON DEFICIENCY ANEMIA: Status: ACTIVE | Noted: 2025-07-29

## 2025-07-29 PROBLEM — Z90.49 S/P RIGHT COLECTOMY: Status: ACTIVE | Noted: 2025-07-29

## 2025-07-29 LAB
ABSOLUTE EOSINOPHIL (OHS): 0 K/UL
ABSOLUTE MONOCYTE (OHS): 0.24 K/UL (ref 0.3–1)
ABSOLUTE NEUTROPHIL COUNT (OHS): 5.17 K/UL (ref 1.8–7.7)
ANION GAP (OHS): 8 MMOL/L (ref 8–16)
BASOPHILS # BLD AUTO: 0 K/UL
BASOPHILS NFR BLD AUTO: 0 %
BUN SERPL-MCNC: 18 MG/DL (ref 8–23)
CALCIUM SERPL-MCNC: 8.3 MG/DL (ref 8.7–10.5)
CHLORIDE SERPL-SCNC: 108 MMOL/L (ref 95–110)
CO2 SERPL-SCNC: 21 MMOL/L (ref 23–29)
CREAT SERPL-MCNC: 1.6 MG/DL (ref 0.5–1.4)
ERYTHROCYTE [DISTWIDTH] IN BLOOD BY AUTOMATED COUNT: 18.5 % (ref 11.5–14.5)
GFR SERPLBLD CREATININE-BSD FMLA CKD-EPI: 44 ML/MIN/1.73/M2
GLUCOSE SERPL-MCNC: 258 MG/DL (ref 70–110)
HCT VFR BLD AUTO: 37.4 % (ref 40–54)
HGB BLD-MCNC: 11.9 GM/DL (ref 14–18)
IMM GRANULOCYTES # BLD AUTO: 0.02 K/UL (ref 0–0.04)
IMM GRANULOCYTES NFR BLD AUTO: 0.4 % (ref 0–0.5)
LYMPHOCYTES # BLD AUTO: 0.23 K/UL (ref 1–4.8)
MAGNESIUM SERPL-MCNC: 1.8 MG/DL (ref 1.6–2.6)
MCH RBC QN AUTO: 26.4 PG (ref 27–31)
MCHC RBC AUTO-ENTMCNC: 31.8 G/DL (ref 32–36)
MCV RBC AUTO: 83 FL (ref 82–98)
NUCLEATED RBC (/100WBC) (OHS): 0 /100 WBC
OHS QRS DURATION: 92 MS
OHS QTC CALCULATION: 453 MS
PHOSPHATE SERPL-MCNC: 2.8 MG/DL (ref 2.7–4.5)
PLATELET # BLD AUTO: 124 K/UL (ref 150–450)
PMV BLD AUTO: ABNORMAL FL
POCT GLUCOSE: 180 MG/DL (ref 70–110)
POCT GLUCOSE: 187 MG/DL (ref 70–110)
POCT GLUCOSE: 192 MG/DL (ref 70–110)
POCT GLUCOSE: 263 MG/DL (ref 70–110)
POCT GLUCOSE: 282 MG/DL (ref 70–110)
POTASSIUM SERPL-SCNC: 4.3 MMOL/L (ref 3.5–5.1)
RBC # BLD AUTO: 4.51 M/UL (ref 4.6–6.2)
RELATIVE EOSINOPHIL (OHS): 0 %
RELATIVE LYMPHOCYTE (OHS): 4.1 % (ref 18–48)
RELATIVE MONOCYTE (OHS): 4.2 % (ref 4–15)
RELATIVE NEUTROPHIL (OHS): 91.3 % (ref 38–73)
SODIUM SERPL-SCNC: 137 MMOL/L (ref 136–145)
WBC # BLD AUTO: 5.66 K/UL (ref 3.9–12.7)

## 2025-07-29 PROCEDURE — 94761 N-INVAS EAR/PLS OXIMETRY MLT: CPT

## 2025-07-29 PROCEDURE — 25000003 PHARM REV CODE 250

## 2025-07-29 PROCEDURE — 36415 COLL VENOUS BLD VENIPUNCTURE: CPT | Performed by: STUDENT IN AN ORGANIZED HEALTH CARE EDUCATION/TRAINING PROGRAM

## 2025-07-29 PROCEDURE — 97161 PT EVAL LOW COMPLEX 20 MIN: CPT

## 2025-07-29 PROCEDURE — 84100 ASSAY OF PHOSPHORUS: CPT | Performed by: STUDENT IN AN ORGANIZED HEALTH CARE EDUCATION/TRAINING PROGRAM

## 2025-07-29 PROCEDURE — 97165 OT EVAL LOW COMPLEX 30 MIN: CPT

## 2025-07-29 PROCEDURE — 80048 BASIC METABOLIC PNL TOTAL CA: CPT | Performed by: STUDENT IN AN ORGANIZED HEALTH CARE EDUCATION/TRAINING PROGRAM

## 2025-07-29 PROCEDURE — 20600001 HC STEP DOWN PRIVATE ROOM

## 2025-07-29 PROCEDURE — 85025 COMPLETE CBC W/AUTO DIFF WBC: CPT | Performed by: STUDENT IN AN ORGANIZED HEALTH CARE EDUCATION/TRAINING PROGRAM

## 2025-07-29 PROCEDURE — 63600175 PHARM REV CODE 636 W HCPCS: Performed by: STUDENT IN AN ORGANIZED HEALTH CARE EDUCATION/TRAINING PROGRAM

## 2025-07-29 PROCEDURE — 25000003 PHARM REV CODE 250: Performed by: STUDENT IN AN ORGANIZED HEALTH CARE EDUCATION/TRAINING PROGRAM

## 2025-07-29 PROCEDURE — 99900035 HC TECH TIME PER 15 MIN (STAT)

## 2025-07-29 PROCEDURE — 83735 ASSAY OF MAGNESIUM: CPT | Performed by: STUDENT IN AN ORGANIZED HEALTH CARE EDUCATION/TRAINING PROGRAM

## 2025-07-29 RX ORDER — METHOCARBAMOL 500 MG/1
500 TABLET, FILM COATED ORAL 3 TIMES DAILY
Status: DISCONTINUED | OUTPATIENT
Start: 2025-07-29 | End: 2025-07-31 | Stop reason: HOSPADM

## 2025-07-29 RX ADMIN — METHOCARBAMOL 500 MG: 500 TABLET ORAL at 01:07

## 2025-07-29 RX ADMIN — GABAPENTIN 100 MG: 100 CAPSULE ORAL at 08:07

## 2025-07-29 RX ADMIN — ATORVASTATIN CALCIUM 20 MG: 20 TABLET, FILM COATED ORAL at 08:07

## 2025-07-29 RX ADMIN — INSULIN ASPART 2 UNITS: 100 INJECTION, SOLUTION INTRAVENOUS; SUBCUTANEOUS at 12:07

## 2025-07-29 RX ADMIN — ACETAMINOPHEN 1000 MG: 500 TABLET ORAL at 08:07

## 2025-07-29 RX ADMIN — PANTOPRAZOLE SODIUM 40 MG: 40 TABLET, DELAYED RELEASE ORAL at 08:07

## 2025-07-29 RX ADMIN — ALVIMOPAN 12 MG: 12 CAPSULE ORAL at 08:07

## 2025-07-29 RX ADMIN — MUPIROCIN: 20 OINTMENT TOPICAL at 08:07

## 2025-07-29 RX ADMIN — OXYCODONE HYDROCHLORIDE 10 MG: 10 TABLET ORAL at 01:07

## 2025-07-29 RX ADMIN — GABAPENTIN 100 MG: 100 CAPSULE ORAL at 02:07

## 2025-07-29 RX ADMIN — OXYCODONE HYDROCHLORIDE 10 MG: 10 TABLET ORAL at 03:07

## 2025-07-29 RX ADMIN — INSULIN ASPART 2 UNITS: 100 INJECTION, SOLUTION INTRAVENOUS; SUBCUTANEOUS at 04:07

## 2025-07-29 RX ADMIN — INSULIN ASPART 1 UNITS: 100 INJECTION, SOLUTION INTRAVENOUS; SUBCUTANEOUS at 08:07

## 2025-07-29 RX ADMIN — ACETAMINOPHEN 1000 MG: 10 INJECTION INTRAVENOUS at 05:07

## 2025-07-29 RX ADMIN — ACETAMINOPHEN 1000 MG: 500 TABLET ORAL at 01:07

## 2025-07-29 RX ADMIN — INSULIN ASPART 6 UNITS: 100 INJECTION, SOLUTION INTRAVENOUS; SUBCUTANEOUS at 08:07

## 2025-07-29 RX ADMIN — METHOCARBAMOL 500 MG: 500 TABLET ORAL at 08:07

## 2025-07-29 NOTE — ASSESSMENT & PLAN NOTE
74 y.o. male with a past surgical history of liver resection and R fem-pop bypass presents today with a right-sided colon cancer for elective resection. Lap right colectomy performed on 7/28.    -Advance to D  -[facility]  -Ambulate as tolerated  -restart home meds as appropriate  -Monitor labs - replace lytes PRN    Dispo: MATTEO

## 2025-07-29 NOTE — NURSING
"[facility] Plan of Care Note    Dx:   Colon adenocarcinoma [C18.9]  Colon cancer [C18.9]    Shift Events: Pt voided after ricks removal, PRN pain meds provided and pt ambulated down the berg. Pt is stable at this time with incentive spirometry education provided.    Goals of Care: See careplan     Neuro: A&Ox4    Vital Signs: BP (!) 127/59   Pulse (!) 54   Temp 98.7 °F (37.1 °C) (Oral)   Resp 19   Ht 5' 8" (1.727 m)   Wt 72.1 kg (158 lb 15.2 oz)   SpO2 97%   BMI 24.17 kg/m²     Respiratory: RA    Diet: Diet Low Fiber/Residue      Is patient tolerating current diet? Yes    GTTS: None    Urine Output/Bowel Movement:   I/O this shift:  In: 480 [P.O.:480]  Out: -   Last Bowel Movement: 07/28/25      Drains/Tubes/Tube Feeds (include total output/shift):   I/O this shift:  In: 480 [P.O.:480]  Out: -       Lines: See LDA      Accuchecks:ACHS    Skin: See flowsheet    Fall Risk Score: 9    Activity level? Independent    Any scheduled procedures?     Any safety concerns?     Other:    "

## 2025-07-29 NOTE — PLAN OF CARE
Shift Events: placed on bed comfortably, Vs checked and recorded.Due meds given,   -call light within reached, Continued POC.    Problem: Adult Inpatient Plan of Care  Goal: Plan of Care Review  Outcome: Progressing  Goal: Patient-Specific Goal (Individualized)  Outcome: Progressing  Goal: Absence of Hospital-Acquired Illness or Injury  Outcome: Progressing  Goal: Optimal Comfort and Wellbeing  Outcome: Progressing  Goal: Readiness for Transition of Care  Outcome: Progressing     Problem: Diabetes Comorbidity  Goal: Blood Glucose Level Within Targeted Range  Outcome: Progressing     Problem: Acute Kidney Injury/Impairment  Goal: Fluid and Electrolyte Balance  Outcome: Progressing  Goal: Improved Oral Intake  Outcome: Progressing  Goal: Effective Renal Function  Outcome: Progressing     Problem: Hospitalized Older Adult  Goal: Optimal Cognitive Function  Outcome: Progressing  Goal: Effective Bowel Elimination  Outcome: Progressing  Goal: Optimal Coping  Outcome: Progressing  Goal: Fluid and Electrolyte Balance  Outcome: Progressing  Goal: Optimal Functional Ability  Outcome: Progressing  Goal: Improved Oral Intake  Outcome: Progressing  Goal: Adequate Sleep/Rest  Outcome: Progressing  Goal: Effective Urinary Elimination  Outcome: Progressing     Problem: Infection  Goal: Absence of Infection Signs and Symptoms  Outcome: Progressing     Problem: Wound  Goal: Optimal Coping  Outcome: Progressing  Goal: Optimal Functional Ability  Outcome: Progressing  Goal: Absence of Infection Signs and Symptoms  Outcome: Progressing  Goal: Improved Oral Intake  Outcome: Progressing  Goal: Optimal Pain Control and Function  Outcome: Progressing  Goal: Skin Health and Integrity  Outcome: Progressing  Goal: Optimal Wound Healing  Outcome: Progressing     Problem: Fall Injury Risk  Goal: Absence of Fall and Fall-Related Injury  Outcome: Progressing     Problem: Skin Injury Risk Increased  Goal: Skin Health and Integrity  Outcome:  Progressing

## 2025-07-29 NOTE — PT/OT/SLP EVAL
Occupational Therapy   Evaluation and Discharge Note    Name: Oneil Meza  MRN: [id number]  Admitting Diagnosis: Colon adenocarcinoma  Recent Surgery: Procedure(s) (LRB):  COLECTOMY, RIGHT, LAPAROSCOPIC, ERAS high (N/A) 1 Day Post-Op    Recommendations:     Discharge Recommendations: No Therapy Indicated  Discharge Equipment Recommendations: none  Barriers to discharge:  None    Assessment:     Oneil Meza is a 74 y.o. male with a medical diagnosis of Colon adenocarcinoma. At this time, patient is functioning at their prior level of function and does not require further acute OT services.     Plan:     During this hospitalization, patient does not require further acute OT services.  Please re-consult if situation changes.    Plan of Care Reviewed with: patient    Subjective     Occupational Profile:  Living Environment: Pt lives with his brother in [facility] with no NUNU. Bathroom: tub/shower combo   Prior Level of Function: IND  DME owned: none  Caregiver Assistance: brother    Pain/Comfort:  Pain Rating 1: 8/10  Location 1: abdomen    Patients cultural, spiritual, Latter day conflicts given the current situation:      Objective:     Communicated with: rn prior to session.  Patient found sitting edge of bed with telemetry upon OT entry to room.    General Precautions: Standard,    Orthopedic Precautions:    Braces:    Respiratory Status: Room air     Occupational Performance:    Bed Mobility:    Independent    Functional Mobility/Transfers:  Independent    Activities of Daily Living:  Independent    Physical Exam:  Upper Extremity Range of Motion:     -       Right Upper Extremity: WNL  -       Left Upper Extremity: WNL  Upper Extremity Strength:    -       Right Upper Extremity: WNL  -       Left Upper Extremity: WNL    AMPAC 6 Click ADL:  AMPAC Total Score: 24    Treatment & Education:  UE ROM/MMT  Bed mobility training / assessment  Functional mobility assessment  Sit/standing balance assessment  Educated on  importance of sitting OOB in bedside chair to promote increased strength, endurance & breathing.  Discussed D/C of OT    Patient left sitting edge of bed with all lines intact and call button in reach    GOALS:   Multidisciplinary Problems       Occupational Therapy Goals       Not on file                    DME Justifications:  No DME recommended requiring DME justifications    History:     Past Medical History:   Diagnosis Date    Anticoagulant long-term use     Coronary artery disease     Disorder of kidney and ureter     Hypertension          Past Surgical History:   Procedure Laterality Date    COLONOSCOPY N/A 5/30/2025    Procedure: COLONOSCOPY;  Surgeon: Korey Bettencourt MD;  Location: [facility];  Service: Endoscopy;  Laterality: N/A;    ESOPHAGOGASTRODUODENOSCOPY N/A 4/28/2025    Procedure: EGD (ESOPHAGOGASTRODUODENOSCOPY);  Surgeon: Korey Bettencourt MD;  Location: [facility];  Service: Endoscopy;  Laterality: N/A;    LAPAROSCOPIC RIGHT COLON RESECTION N/A 7/28/2025    Procedure: COLECTOMY, RIGHT, LAPAROSCOPIC, ERAS high;  Surgeon: RINA Calderon MD;  Location: [address];  Service: Colon and Rectal;  Laterality: N/A;    VASCULAR SURGERY      stent to left leg artery-per pt report       Time Tracking:     OT Date of Treatment: 07/29/25  OT Start Time: 1035  OT Stop Time: 1046  OT Total Time (min): 11 min    Billable Minutes:Evaluation 11    7/29/2025

## 2025-07-29 NOTE — SUBJECTIVE & OBJECTIVE
"  Subjective:     Interval History: NAEON. Bradycardia noted - known history of, asymptomatic, BP stable. AROBF, having "just a little" gas. [place] removed - due to void. Decent pain control. Encouraged ambulation.     Post-Op Info:  Procedure(s) (LRB):  COLECTOMY, RIGHT, LAPAROSCOPIC, ERAS high (N/A)   1 Day Post-Op      Medications:  Continuous Infusions:  Scheduled Meds:   acetaminophen  1,000 mg Oral Q8H    alvimopan  12 mg Oral BID    atorvastatin  20 mg Oral Daily    gabapentin  100 mg Oral TID    mupirocin   Nasal BID    pantoprazole  40 mg Oral Daily     PRN Meds:   acetaminophen tablet 1,000 mg    alvimopan capsule 12 mg    atorvastatin tablet 20 mg    gabapentin capsule 100 mg    mupirocin 2 % ointment    pantoprazole EC tablet 40 mg        Objective:     Vital Signs (Most Recent):  Temp: 98.4 °F (36.9 °C) (07/29/25 1159)  Pulse: 63 (07/29/25 1159)  Resp: 19 (07/29/25 1159)  BP: (!) 107/53 (07/29/25 1159)  SpO2: 97 % (07/29/25 1159) Vital Signs (24h Range):  Temp:  [97.3 °F (36.3 °C)-98.7 °F (37.1 °C)] 98.4 °F (36.9 °C)  Pulse:  [40-72] 63  Resp:  [13-24] 19  SpO2:  [94 %-100 %] 97 %  BP: (107-167)/(53-81) 107/53     Intake/Output - Last 3 Shifts         07/27 0700  07/28 0659 07/28 0700 07/29 0659 07/29 0700  07/30 0659    P.O.  480     I.V. (mL/kg)  671.2 (9.3)     IV Piggyback  1494     Total Intake(mL/kg)  2645.2 (36.7)     Urine (mL/kg/hr)  1400 (0.8)     Stool  0     Total Output  1400     Net  +1245.2            Unmeasured Urine Occurrence  0 x     Unmeasured Stool Occurrence  0 x              Physical Exam  Vitals and nursing note reviewed.   Constitutional:       General: He is not in acute distress.  Pulmonary:      Effort: Pulmonary effort is normal. No respiratory distress.   Abdominal:      General: There is no distension.      Palpations: Abdomen is soft.      Tenderness: There is generalized abdominal tenderness.      Comments: Midline dressing intact with dried drainage. Lap sites with " "steri-strips CDI.    Musculoskeletal:         General: Normal range of motion.   Skin:     General: Skin is warm and dry.   Neurological:      Mental Status: He is alert and oriented to person, place, and time.            Significant Labs:  BMP (Last 3 Results):   Recent Labs   Lab 07/29/25  0232   *      K 4.3      CO2 21*   BUN 18   CREATININE 1.6*   CALCIUM 8.3*   MG 1.8     CBC (Last 3 Results):   Recent Labs   Lab 07/23/25  1144 07/29/25  0232   WBC 2.51* 5.66   RBC 5.00 4.51*   HGB 13.1* 11.9*   HCT 42.9 37.4*   * 124*   MCV 86 83   MCH 26.2* 26.4*   MCHC 30.5* 31.8*     CRP (Last 3 Results): No results for input(s): "CRP" in the last 168 hours.    Significant Diagnostics:  None  "

## 2025-07-29 NOTE — PT/OT/SLP EVAL
Physical Therapy Co-Evaluation and Discharge Note    Patient Name:  Oneil Meza   MRN:  [id number]    Recommendations:     Discharge Recommendations: No Therapy Indicated  Discharge Equipment Recommendations: none   Barriers to discharge: None    Assessment:     Oneil Meza is a 74 y.o. male admitted with a medical diagnosis of Colon adenocarcinoma. .  At this time, patient is functioning at their prior level of function and does not require further acute PT services.     Recent Surgery: Procedure(s) (LRB):  COLECTOMY, RIGHT, LAPAROSCOPIC, ERAS high (N/A) 1 Day Post-Op    Plan:     During this hospitalization, patient does not require further acute PT services.  Please re-consult if situation changes.      Subjective     Chief Complaint: abdominal pain  Patient/Family Comments/goals: To get better  Pain/Comfort:  Pain Rating 1: 8/10  Location - Orientation 1: generalized  Location 1: abdomen  Pain Addressed 1: Reposition, Distraction  Pain Rating Post-Intervention 1: 8/10    Patients cultural, spiritual, Adventism conflicts given the current situation: no    Patient History:     Living Environment: Pt lives with his brother in [facility] with no NUNU. Bathroom: tub/shower combo   Prior Level of Function: IND  DME owned: none  Caregiver Assistance: brother    Objective:     Communicated with RN prior to session.  Patient found sitting edge of bed with telemetry upon PT entry to room.    General Precautions: Standard, fall    Orthopedic Precautions:N/A   Braces: N/A  Respiratory Status: Room air    Exams:  Gross Motor Coordination:  WFL  RLE ROM: WFL  RLE Strength: WFL  LLE ROM: WFL  LLE Strength: WFL    Functional Mobility:    Bed Mobility:   N/T 2/2 pt sitting EOB upon PT arrival     Transfers:   Sit <> Stand Transfer: independence from EOB without AD    Balance:   Sitting balance: GOOD: Maintains balance through MODERATE excursions of active trunk movement  Standing balance:   GOOD-: Takes MODERATE challenges from  all directions inconsistently  GOOD: Needs SUPERVISION only during gait and able to self right with moderate LOB                 Gait:  Distance: ~350 ft  Assistive Device: no AD  Assistance Level: independence  Gait Assessment: Pt amb with fair manjinder and no LOB      AM-PAC 6 CLICK MOBILITY  Total Score:24       Treatment and Education:  Pt educated on tip to reduce fall risk and safety with mobility and using call button for assistance from nursing staff with OOB mobility.  Pt educated on sitting up in chair 2-4 hours of the day  Pt educated on amb 2-3x per day with assistance from staff and increased movement during hospital stay.  All questions answered within the scope of PT.  White board updated accordingly.    AM-PAC 6 CLICK MOBILITY  Total Score:24     Patient left sitting edge of bed with all lines intact and call button in reach.    GOALS:   Multidisciplinary Problems       Physical Therapy Goals       Not on file              Multidisciplinary Problems (Resolved)          Problem: Physical Therapy    Goal Priority Disciplines Outcome Interventions   Physical Therapy Goal   (Resolved)     PT, PT/OT Met                        DME Justifications:  No DME recommended requiring DME justifications    History:     Past Medical History:   Diagnosis Date    Anticoagulant long-term use     Coronary artery disease     Disorder of kidney and ureter     Hypertension        Past Surgical History:   Procedure Laterality Date    COLONOSCOPY N/A 5/30/2025    Procedure: COLONOSCOPY;  Surgeon: Korey Bettencourt MD;  Location: [facility];  Service: Endoscopy;  Laterality: N/A;    ESOPHAGOGASTRODUODENOSCOPY N/A 4/28/2025    Procedure: EGD (ESOPHAGOGASTRODUODENOSCOPY);  Surgeon: Korey Bettencourt MD;  Location: [facility];  Service: Endoscopy;  Laterality: N/A;    LAPAROSCOPIC RIGHT COLON RESECTION N/A 7/28/2025    Procedure: COLECTOMY, RIGHT, LAPAROSCOPIC, ERAS high;  Surgeon: RINA Calderon MD;  Location: [address]  [facility];  Service: Colon and Rectal;  Laterality: N/A;    VASCULAR SURGERY      stent to left leg artery-per pt report       Time Tracking:     PT Received On: 07/29/25  PT Start Time: 1036     PT Stop Time: 1046  PT Total Time (min): 10 min     Billable Minutes: Evaluation 10      07/29/2025

## 2025-07-29 NOTE — PROGRESS NOTES
"[facility]  Colorectal Surgery  Progress Note    Patient Name: Oneil Meza  MRN: [id number]  Admission Date: 7/28/2025  Hospital Length of Stay: 1 days  Attending Physician: RINA Calderon MD    Subjective:     Interval History: NAEON. Bradycardia noted - known history of, asymptomatic, BP stable. AROBF, having "just a little" gas. [place] removed - due to void. Decent pain control. Encouraged ambulation.     Post-Op Info:  Procedure(s) (LRB):  COLECTOMY, RIGHT, LAPAROSCOPIC, ERAS high (N/A)   1 Day Post-Op      Medications:  Continuous Infusions:  Scheduled Meds:   acetaminophen  1,000 mg Oral Q8H    alvimopan  12 mg Oral BID    atorvastatin  20 mg Oral Daily    gabapentin  100 mg Oral TID    mupirocin   Nasal BID    pantoprazole  40 mg Oral Daily     PRN Meds:   acetaminophen tablet 1,000 mg    alvimopan capsule 12 mg    atorvastatin tablet 20 mg    gabapentin capsule 100 mg    mupirocin 2 % ointment    pantoprazole EC tablet 40 mg        Objective:     Vital Signs (Most Recent):  Temp: 98.4 °F (36.9 °C) (07/29/25 1159)  Pulse: 63 (07/29/25 1159)  Resp: 19 (07/29/25 1159)  BP: (!) 107/53 (07/29/25 1159)  SpO2: 97 % (07/29/25 1159) Vital Signs (24h Range):  Temp:  [97.3 °F (36.3 °C)-98.7 °F (37.1 °C)] 98.4 °F (36.9 °C)  Pulse:  [40-72] 63  Resp:  [13-24] 19  SpO2:  [94 %-100 %] 97 %  BP: (107-167)/(53-81) 107/53     Intake/Output - Last 3 Shifts         07/27 0700 07/28 0659 07/28 0700 07/29 0659 07/29 0700 07/30 0659    P.O.  480     I.V. (mL/kg)  671.2 (9.3)     IV Piggyback  1494     Total Intake(mL/kg)  2645.2 (36.7)     Urine (mL/kg/hr)  1400 (0.8)     Stool  0     Total Output  1400     Net  +1245.2            Unmeasured Urine Occurrence  0 x     Unmeasured Stool Occurrence  0 x              Physical Exam  Vitals and nursing note reviewed.   Constitutional:       General: He is not in acute distress.  Pulmonary:      Effort: Pulmonary effort is normal. No respiratory distress.   Abdominal:     " CALLED [facility] APPT REMINDER FOR 6.12.23   " General: There is no distension.      Palpations: Abdomen is soft.      Tenderness: There is generalized abdominal tenderness.      Comments: Midline dressing intact with dried drainage. Lap sites with steri-strips CDI.    Musculoskeletal:         General: Normal range of motion.   Skin:     General: Skin is warm and dry.   Neurological:      Mental Status: He is alert and oriented to person, place, and time.            Significant Labs:  BMP (Last 3 Results):   Recent Labs   Lab 07/29/25  0232   *      K 4.3      CO2 21*   BUN 18   CREATININE 1.6*   CALCIUM 8.3*   MG 1.8     CBC (Last 3 Results):   Recent Labs   Lab 07/23/25  1144 07/29/25  0232   WBC 2.51* 5.66   RBC 5.00 4.51*   HGB 13.1* 11.9*   HCT 42.9 37.4*   * 124*   MCV 86 83   MCH 26.2* 26.4*   MCHC 30.5* 31.8*     CRP (Last 3 Results): No results for input(s): "CRP" in the last 168 hours.    Significant Diagnostics:  None  Assessment/Plan:     * Colon adenocarcinoma  74 y.o. male with a past surgical history of liver resection and R fem-pop bypass presents today with a right-sided colon cancer for elective resection. Lap right colectomy performed on 7/28.    -Advance to D  -[facility]  -Ambulate as tolerated  -restart home meds as appropriate  -Monitor labs - replace lytes PRN    Dispo: MTATEO Bateman, FNP-C  Colorectal Surgery  [facility]        "

## 2025-07-29 NOTE — PLAN OF CARE
Patient does not require acute PT services at this time due to being at (I) PLOF and demonstrated safety with functional mobility, including transfers and ambulation    Problem: Physical Therapy  Goal: Physical Therapy Goal  Outcome: Met   7/29/2025

## 2025-07-29 NOTE — CARE UPDATE
I have reviewed the chart of Oneil Meza who is hospitalized for the following:    Active Hospital Problems    Diagnosis    *Colon adenocarcinoma    S/P right colectomy    BMI 24.0-24.9, adult    High grade dysplasia in colonic adenoma    Iron deficiency anemia     Monitor cbc  Transfuse if hgb less than 7      Pulmonary hypertension    Chronic diastolic congestive heart failure    A-fib    HTN (hypertension)    DM (diabetes mellitus)    Coagulopathy        Rogelio Whitehead, BERNADETTEP-C  [facility] [place]

## 2025-07-30 LAB
POCT GLUCOSE: 190 MG/DL (ref 70–110)
POCT GLUCOSE: 208 MG/DL (ref 70–110)
POCT GLUCOSE: 212 MG/DL (ref 70–110)
POCT GLUCOSE: 237 MG/DL (ref 70–110)

## 2025-07-30 PROCEDURE — 63600175 PHARM REV CODE 636 W HCPCS

## 2025-07-30 PROCEDURE — 94761 N-INVAS EAR/PLS OXIMETRY MLT: CPT

## 2025-07-30 PROCEDURE — 99900035 HC TECH TIME PER 15 MIN (STAT)

## 2025-07-30 PROCEDURE — 94799 UNLISTED PULMONARY SVC/PX: CPT

## 2025-07-30 PROCEDURE — 63600175 PHARM REV CODE 636 W HCPCS: Performed by: STUDENT IN AN ORGANIZED HEALTH CARE EDUCATION/TRAINING PROGRAM

## 2025-07-30 PROCEDURE — 20600001 HC STEP DOWN PRIVATE ROOM

## 2025-07-30 PROCEDURE — 25000003 PHARM REV CODE 250

## 2025-07-30 PROCEDURE — 25000003 PHARM REV CODE 250: Performed by: STUDENT IN AN ORGANIZED HEALTH CARE EDUCATION/TRAINING PROGRAM

## 2025-07-30 RX ORDER — HEPARIN SODIUM 5000 [USP'U]/ML
5000 INJECTION, SOLUTION INTRAVENOUS; SUBCUTANEOUS EVERY 12 HOURS
Status: DISCONTINUED | OUTPATIENT
Start: 2025-07-30 | End: 2025-07-31 | Stop reason: HOSPADM

## 2025-07-30 RX ORDER — ACETAMINOPHEN 325 MG/1
650 TABLET ORAL EVERY 8 HOURS
Status: DISCONTINUED | OUTPATIENT
Start: 2025-07-30 | End: 2025-07-31 | Stop reason: HOSPADM

## 2025-07-30 RX ADMIN — GABAPENTIN 100 MG: 100 CAPSULE ORAL at 08:07

## 2025-07-30 RX ADMIN — METHOCARBAMOL 500 MG: 500 TABLET ORAL at 02:07

## 2025-07-30 RX ADMIN — METHOCARBAMOL 500 MG: 500 TABLET ORAL at 08:07

## 2025-07-30 RX ADMIN — ACETAMINOPHEN 650 MG: 325 TABLET ORAL at 02:07

## 2025-07-30 RX ADMIN — HYDROMORPHONE HYDROCHLORIDE 0.5 MG: 1 INJECTION, SOLUTION INTRAMUSCULAR; INTRAVENOUS; SUBCUTANEOUS at 04:07

## 2025-07-30 RX ADMIN — HEPARIN SODIUM 5000 UNITS: 5000 INJECTION INTRAVENOUS; SUBCUTANEOUS at 09:07

## 2025-07-30 RX ADMIN — MUPIROCIN: 20 OINTMENT TOPICAL at 08:07

## 2025-07-30 RX ADMIN — INSULIN ASPART 4 UNITS: 100 INJECTION, SOLUTION INTRAVENOUS; SUBCUTANEOUS at 04:07

## 2025-07-30 RX ADMIN — MUPIROCIN: 20 OINTMENT TOPICAL at 09:07

## 2025-07-30 RX ADMIN — PANTOPRAZOLE SODIUM 40 MG: 40 TABLET, DELAYED RELEASE ORAL at 08:07

## 2025-07-30 RX ADMIN — INSULIN ASPART 4 UNITS: 100 INJECTION, SOLUTION INTRAVENOUS; SUBCUTANEOUS at 08:07

## 2025-07-30 RX ADMIN — OXYCODONE HYDROCHLORIDE 10 MG: 10 TABLET ORAL at 08:07

## 2025-07-30 RX ADMIN — ACETAMINOPHEN 650 MG: 325 TABLET ORAL at 10:07

## 2025-07-30 RX ADMIN — ATORVASTATIN CALCIUM 20 MG: 20 TABLET, FILM COATED ORAL at 08:07

## 2025-07-30 RX ADMIN — INSULIN ASPART 1 UNITS: 100 INJECTION, SOLUTION INTRAVENOUS; SUBCUTANEOUS at 08:07

## 2025-07-30 RX ADMIN — OXYCODONE HYDROCHLORIDE 10 MG: 10 TABLET ORAL at 06:07

## 2025-07-30 RX ADMIN — HEPARIN SODIUM 5000 UNITS: 5000 INJECTION INTRAVENOUS; SUBCUTANEOUS at 08:07

## 2025-07-30 RX ADMIN — GABAPENTIN 100 MG: 100 CAPSULE ORAL at 02:07

## 2025-07-30 RX ADMIN — INSULIN ASPART 4 UNITS: 100 INJECTION, SOLUTION INTRAVENOUS; SUBCUTANEOUS at 01:07

## 2025-07-30 NOTE — NURSING
"[facility] Plan of Care Note    Dx:   Colon adenocarcinoma [C18.9]  Colon cancer [C18.9]    Shift Events: No acute events this shift. Pt yet to have a BM.    Goals of Care: See careplan     Neuro: A&Ox4    Vital Signs: BP (!) 139/95   Pulse 60   Temp 98.7 °F (37.1 °C)   Resp 19   Ht 5' 8" (1.727 m)   Wt 72.1 kg (158 lb 15.2 oz)   SpO2 98%   BMI 24.17 kg/m²     Respiratory: RA    Diet: Diet Low Fiber/Residue      Is patient tolerating current diet? Yes    GTTS: None    Urine Output/Bowel Movement:   I/O this shift:  In: 560 [P.O.:560]  Out: -   Last Bowel Movement: 07/28/25      Drains/Tubes/Tube Feeds (include total output/shift):   I/O this shift:  In: 560 [P.O.:560]  Out: -       Lines: See LDA      Accuchecks:ACHS    Skin: See flowsheet    Fall Risk Score: 9    Activity level? Independent    Any scheduled procedures?     Any safety concerns?     Other:    "

## 2025-07-30 NOTE — ASSESSMENT & PLAN NOTE
74 y.o. male with a past surgical history of liver resection and R fem-pop bypass presents today with a right-sided colon cancer for elective resection. Lap right colectomy performed on 7/28.    -Continue LRD  -MMPC   -Ambulate as tolerated  -restart home meds as appropriate  -Monitor labs - replace lytes PRN  -Encourage ambulation    Dispo: MATTEO

## 2025-07-30 NOTE — PROGRESS NOTES
Candido giorgi [facility]  Colorectal Surgery  Progress Note    Patient Name: Oneil Meza  MRN: [id number]  Admission Date: 7/28/2025  Hospital Length of Stay: 2 days  Attending Physician: RINA Calderon MD    Subjective:     Interval History: NAEON. AF/HDS. Known bradycardia - asymptomatic, BP stable. Better pain control with addition of muscle relaxer. Passing gas but no BM yet. Voids without difficulty. Denies n/v, tolerating PO. Encouraged ambulation.     Post-Op Info:  Procedure(s) (LRB):  COLECTOMY, RIGHT, LAPAROSCOPIC, ERAS high (N/A)   2 Days Post-Op      Medications:  Continuous Infusions:  Scheduled Meds:   acetaminophen  650 mg Oral Q8H    atorvastatin  20 mg Oral Daily    gabapentin  100 mg Oral TID    heparin (porcine)  5,000 Units Subcutaneous Q12H    methocarbamoL  500 mg Oral TID    mupirocin   Nasal BID    pantoprazole  40 mg Oral Daily     PRN Meds:   acetaminophen tablet 650 mg    atorvastatin tablet 20 mg    gabapentin capsule 100 mg    heparin (porcine) injection 5,000 Units    methocarbamoL tablet 500 mg    mupirocin 2 % ointment    pantoprazole EC tablet 40 mg        Objective:     Vital Signs (Most Recent):  Temp: 98.4 °F (36.9 °C) (07/30/25 0718)  Pulse: 60 (07/30/25 0718)  Resp: 18 (07/30/25 0832)  BP: 139/65 (07/30/25 0718)  SpO2: 95 % (07/30/25 0718) Vital Signs (24h Range):  Temp:  [97.9 °F (36.6 °C)-98.7 °F (37.1 °C)] 98.4 °F (36.9 °C)  Pulse:  [45-79] 60  Resp:  [15-19] 18  SpO2:  [93 %-98 %] 95 %  BP: (107-145)/(53-77) 139/65     Intake/Output - Last 3 Shifts         07/28 0700 07/29 0659 07/29 0700 07/30 0659 07/30 0700 07/31 0659    P.O. 480 780     I.V. (mL/kg) 671.2 (9.3)      IV Piggyback 1494      Total Intake(mL/kg) 2645.2 (36.7) 780 (10.8)     Urine (mL/kg/hr) 1400 (0.8)      Stool 0      Total Output 1400      Net +1245.2 +780            Unmeasured Urine Occurrence 0 x 4 x     Unmeasured Stool Occurrence 0 x               Physical Exam  Vitals and nursing note reviewed.  "  Constitutional:       General: He is not in acute distress.  Pulmonary:      Effort: Pulmonary effort is normal. No respiratory distress.   Abdominal:      General: There is distension.      Palpations: Abdomen is soft.      Tenderness: There is generalized abdominal tenderness.      Comments: Midline incision steri-strips OSCAR, CDI.  Lap sites with steri-strips OSCAR, CDI.   Musculoskeletal:         General: Normal range of motion.   Skin:     General: Skin is warm and dry.   Neurological:      Mental Status: He is alert and oriented to person, place, and time.            Significant Labs:  BMP (Last 3 Results):   Recent Labs   Lab 07/29/25  0232   *      K 4.3      CO2 21*   BUN 18   CREATININE 1.6*   CALCIUM 8.3*   MG 1.8     CBC (Last 3 Results):   Recent Labs   Lab 07/23/25  1144 07/29/25  0232   WBC 2.51* 5.66   RBC 5.00 4.51*   HGB 13.1* 11.9*   HCT 42.9 37.4*   * 124*   MCV 86 83   MCH 26.2* 26.4*   MCHC 30.5* 31.8*     CRP (Last 3 Results): No results for input(s): "CRP" in the last 168 hours.    Significant Diagnostics:  None  Assessment/Plan:     * Colon adenocarcinoma  74 y.o. male with a past surgical history of liver resection and R fem-pop bypass presents today with a right-sided colon cancer for elective resection. Lap right colectomy performed on 7/28.    -Continue LRD  -MMPC   -Ambulate as tolerated  -restart home meds as appropriate  -Monitor labs - replace lytes PRN  -Encourage ambulation    Dispo: TUSHAR Virgen  Colorectal Surgery  Candido FELIPE        "

## 2025-07-30 NOTE — SUBJECTIVE & OBJECTIVE
Subjective:     Interval History: NAEON. AF/HDS. Known bradycardia - asymptomatic, BP stable. Better pain control with addition of muscle relaxer. Passing gas but no BM yet. Voids without difficulty. Denies n/v, tolerating PO. Encouraged ambulation.     Post-Op Info:  Procedure(s) (LRB):  COLECTOMY, RIGHT, LAPAROSCOPIC, ERAS high (N/A)   2 Days Post-Op      Medications:  Continuous Infusions:  Scheduled Meds:   acetaminophen  650 mg Oral Q8H    atorvastatin  20 mg Oral Daily    gabapentin  100 mg Oral TID    heparin (porcine)  5,000 Units Subcutaneous Q12H    methocarbamoL  500 mg Oral TID    mupirocin   Nasal BID    pantoprazole  40 mg Oral Daily     PRN Meds:   acetaminophen tablet 650 mg    atorvastatin tablet 20 mg    gabapentin capsule 100 mg    heparin (porcine) injection 5,000 Units    methocarbamoL tablet 500 mg    mupirocin 2 % ointment    pantoprazole EC tablet 40 mg        Objective:     Vital Signs (Most Recent):  Temp: 98.4 °F (36.9 °C) (07/30/25 0718)  Pulse: 60 (07/30/25 0718)  Resp: 18 (07/30/25 0832)  BP: 139/65 (07/30/25 0718)  SpO2: 95 % (07/30/25 0718) Vital Signs (24h Range):  Temp:  [97.9 °F (36.6 °C)-98.7 °F (37.1 °C)] 98.4 °F (36.9 °C)  Pulse:  [45-79] 60  Resp:  [15-19] 18  SpO2:  [93 %-98 %] 95 %  BP: (107-145)/(53-77) 139/65     Intake/Output - Last 3 Shifts         07/28 0700 07/29 0659 07/29 0700 07/30 0659 07/30 0700 07/31 0659    P.O. 480 780     I.V. (mL/kg) 671.2 (9.3)      IV Piggyback 1494      Total Intake(mL/kg) 2645.2 (36.7) 780 (10.8)     Urine (mL/kg/hr) 1400 (0.8)      Stool 0      Total Output 1400      Net +1245.2 +780            Unmeasured Urine Occurrence 0 x 4 x     Unmeasured Stool Occurrence 0 x               Physical Exam  Vitals and nursing note reviewed.   Constitutional:       General: He is not in acute distress.  Pulmonary:      Effort: Pulmonary effort is normal. No respiratory distress.   Abdominal:      General: There is distension.      Palpations:  "Abdomen is soft.      Tenderness: There is generalized abdominal tenderness.      Comments: Midline incision steri-strips OSCAR, CDI.  Lap sites with steri-strips OSCAR, CDI.   Musculoskeletal:         General: Normal range of motion.   Skin:     General: Skin is warm and dry.   Neurological:      Mental Status: He is alert and oriented to person, place, and time.            Significant Labs:  BMP (Last 3 Results):   Recent Labs   Lab 07/29/25  0232   *      K 4.3      CO2 21*   BUN 18   CREATININE 1.6*   CALCIUM 8.3*   MG 1.8     CBC (Last 3 Results):   Recent Labs   Lab 07/23/25  1144 07/29/25  0232   WBC 2.51* 5.66   RBC 5.00 4.51*   HGB 13.1* 11.9*   HCT 42.9 37.4*   * 124*   MCV 86 83   MCH 26.2* 26.4*   MCHC 30.5* 31.8*     CRP (Last 3 Results): No results for input(s): "CRP" in the last 168 hours.    Significant Diagnostics:  None  "

## 2025-07-30 NOTE — PLAN OF CARE
Candido Coppola GIS[facility]  Initial Discharge Assessment       Primary Care Provider: No primary care provider on file.    Admission Diagnosis: Colon adenocarcinoma [C18.9]  Colon cancer [C18.9]    Admission Date: 7/28/2025  Expected Discharge Date: 8/1/2025    Transition of Care Barriers: None    Payor: [employer] BLUE MEDICARE ADVANTAGE / Plan: [employer] BLUE DUAL ADVANTAGE / Product Type: Medicare Advantage /     Extended Emergency Contact Information  Primary Emergency Contact: Carlos Hanna   [facility]  Home Phone: [phone]  Mobile Phone: [phone]  Relation: Brother    Discharge Plan A: Home with family  Discharge Plan B: [facility] [place] [id number] - [place], [place] - [address] AT [facility] [address]  [address]  [place] [address]  Phone: [phone] Fax: [phone]      Initial Assessment (most recent)       Adult Discharge Assessment - 07/30/25 1607          Discharge Assessment    Assessment Type Discharge Planning Assessment     Confirmed/corrected address, phone number and insurance Yes     Confirmed Demographics Correct on Facesheet     Source of Information patient     Communicated HILARIO with patient/caregiver Yes     People in Home sibling(s)     Do you expect to return to your current living situation? Yes     Do you have help at home or someone to help you manage your care at home? Yes     Prior to hospitilization cognitive status: Alert/Oriented     Current cognitive status: Alert/Oriented     Home Layout Able to live on 1st floor     Do you take prescription medications? Yes     Do you have prescription coverage? Yes     Do you have any problems affording any of your prescribed medications? No     Is the patient taking medications as prescribed? yes     Who is going to help you get home at discharge? brother     How do you get to doctors appointments? family or friend will provide     Are you on dialysis? No     Do you take coumadin? No     Discharge  Plan A Home with family     Discharge Plan B Home Health     Discharge Plan discussed with: Patient     Transition of Care Barriers None     SDOH --   no       Physical Activity    On average, how many days per week do you engage in moderate to strenuous exercise (like a brisk walk)? 0 days     On average, how many minutes do you engage in exercise at this level? 0 min        Financial Resource Strain    How hard is it for you to pay for the very basics like food, housing, medical care, and heating? Not hard at all        Housing Stability    In the last 12 months, was there a time when you were not able to pay the mortgage or rent on time? No     At any time in the past 12 months, were you homeless or living in a shelter (including now)? No        Transportation Needs    In the past 12 months, has lack of transportation kept you from medical appointments or from getting medications? No     In the past 12 months, has lack of transportation kept you from meetings, work, or from getting things needed for daily living? No        Food Insecurity    Within the past 12 months, you worried that your food would run out before you got the money to buy more. Never true     Within the past 12 months, the food you bought just didn't last and you didn't have money to get more. Never true        Stress    Do you feel stress - tense, restless, nervous, or anxious, or unable to sleep at night because your mind is troubled all the time - these days? Not at all        Social Isolation    How often do you feel lonely or isolated from those around you?  Never        Alcohol Use    Q1: How often do you have a drink containing alcohol? Never     Q2: How many drinks containing alcohol do you have on a typical day when you are drinking? Patient does not drink     Q3: How often do you have six or more drinks on one occasion? Never        [employer]    In the past 12 months has the electric, gas, oil, or water company threatened to shut off  services in your home? No        Health Literacy    How often do you need to have someone help you when you read instructions, pamphlets, or other written material from your doctor or pharmacy? Never                   The CM met with the patient at bedside to complete the DPA. The CM placed name and contact information on the blackboard in the patient's room.  Use preferred pharmacy / bedside delivery for any necessary  medications at the time of discharge.The patient is independent with all ADLs. The patient is not on Dialysis or Coumadin. The patient's brother will provide assistance to the patient upon discharge. The patient's brother will provide transportation upon discharge .  The CM will continue to follow for course of hospitalization.

## 2025-07-30 NOTE — PLAN OF CARE
Shift Events: placed on bed comfortably, Vs checked and recorded.Due meds given, Blood sugar monitoring done.  -On tele with several bradycardic episodes ranging 30's to 50's.  -call light within reached, Continued POC.    Problem: Adult Inpatient Plan of Care  Goal: Plan of Care Review  Outcome: Progressing  Goal: Patient-Specific Goal (Individualized)  Outcome: Progressing  Goal: Absence of Hospital-Acquired Illness or Injury  Outcome: Progressing  Goal: Optimal Comfort and Wellbeing  Outcome: Progressing  Goal: Readiness for Transition of Care  Outcome: Progressing     Problem: Diabetes Comorbidity  Goal: Blood Glucose Level Within Targeted Range  Outcome: Progressing     Problem: Acute Kidney Injury/Impairment  Goal: Fluid and Electrolyte Balance  Outcome: Progressing  Goal: Improved Oral Intake  Outcome: Progressing  Goal: Effective Renal Function  Outcome: Progressing     Problem: Hospitalized Older Adult  Goal: Optimal Cognitive Function  Outcome: Progressing  Goal: Effective Bowel Elimination  Outcome: Progressing  Goal: Optimal Coping  Outcome: Progressing  Goal: Fluid and Electrolyte Balance  Outcome: Progressing  Goal: Optimal Functional Ability  Outcome: Progressing  Goal: Improved Oral Intake  Outcome: Progressing  Goal: Adequate Sleep/Rest  Outcome: Progressing  Goal: Effective Urinary Elimination  Outcome: Progressing     Problem: Infection  Goal: Absence of Infection Signs and Symptoms  Outcome: Progressing     Problem: Wound  Goal: Optimal Coping  Outcome: Progressing  Goal: Optimal Functional Ability  Outcome: Progressing  Goal: Absence of Infection Signs and Symptoms  Outcome: Progressing  Goal: Improved Oral Intake  Outcome: Progressing  Goal: Optimal Pain Control and Function  Outcome: Progressing  Goal: Skin Health and Integrity  Outcome: Progressing  Goal: Optimal Wound Healing  Outcome: Progressing     Problem: Fall Injury Risk  Goal: Absence of Fall and Fall-Related Injury  Outcome:  Progressing     Problem: Skin Injury Risk Increased  Goal: Skin Health and Integrity  Outcome: Progressing

## 2025-07-31 VITALS
WEIGHT: 158.94 LBS | HEIGHT: 68 IN | TEMPERATURE: 98 F | DIASTOLIC BLOOD PRESSURE: 67 MMHG | RESPIRATION RATE: 18 BRPM | HEART RATE: 83 BPM | BODY MASS INDEX: 24.09 KG/M2 | SYSTOLIC BLOOD PRESSURE: 155 MMHG | OXYGEN SATURATION: 99 %

## 2025-07-31 LAB
CRP SERPL-MCNC: 48.7 MG/L
POCT GLUCOSE: 227 MG/DL (ref 70–110)
POCT GLUCOSE: 236 MG/DL (ref 70–110)

## 2025-07-31 PROCEDURE — 25000003 PHARM REV CODE 250: Performed by: STUDENT IN AN ORGANIZED HEALTH CARE EDUCATION/TRAINING PROGRAM

## 2025-07-31 PROCEDURE — 36415 COLL VENOUS BLD VENIPUNCTURE: CPT | Performed by: STUDENT IN AN ORGANIZED HEALTH CARE EDUCATION/TRAINING PROGRAM

## 2025-07-31 PROCEDURE — 25000003 PHARM REV CODE 250

## 2025-07-31 PROCEDURE — 63600175 PHARM REV CODE 636 W HCPCS: Performed by: STUDENT IN AN ORGANIZED HEALTH CARE EDUCATION/TRAINING PROGRAM

## 2025-07-31 PROCEDURE — 86140 C-REACTIVE PROTEIN: CPT | Performed by: STUDENT IN AN ORGANIZED HEALTH CARE EDUCATION/TRAINING PROGRAM

## 2025-07-31 RX ORDER — GABAPENTIN 100 MG/1
100 CAPSULE ORAL 3 TIMES DAILY
Qty: 90 CAPSULE | Refills: 0 | Status: SHIPPED | OUTPATIENT
Start: 2025-07-31 | End: 2025-08-30

## 2025-07-31 RX ORDER — METHOCARBAMOL 500 MG/1
500 TABLET, FILM COATED ORAL 3 TIMES DAILY
Qty: 30 TABLET | Refills: 0 | Status: SHIPPED | OUTPATIENT
Start: 2025-07-31 | End: 2025-08-10

## 2025-07-31 RX ORDER — BISACODYL 10 MG/1
10 SUPPOSITORY RECTAL ONCE
Status: COMPLETED | OUTPATIENT
Start: 2025-07-31 | End: 2025-07-31

## 2025-07-31 RX ORDER — OXYCODONE HYDROCHLORIDE 5 MG/1
5 TABLET ORAL EVERY 4 HOURS PRN
Qty: 20 TABLET | Refills: 0 | Status: SHIPPED | OUTPATIENT
Start: 2025-07-31

## 2025-07-31 RX ADMIN — METHOCARBAMOL 500 MG: 500 TABLET ORAL at 09:07

## 2025-07-31 RX ADMIN — OXYCODONE HYDROCHLORIDE 10 MG: 10 TABLET ORAL at 09:07

## 2025-07-31 RX ADMIN — HEPARIN SODIUM 5000 UNITS: 5000 INJECTION INTRAVENOUS; SUBCUTANEOUS at 09:07

## 2025-07-31 RX ADMIN — GABAPENTIN 100 MG: 100 CAPSULE ORAL at 09:07

## 2025-07-31 RX ADMIN — BISACODYL 10 MG: 10 SUPPOSITORY RECTAL at 09:07

## 2025-07-31 RX ADMIN — PANTOPRAZOLE SODIUM 40 MG: 40 TABLET, DELAYED RELEASE ORAL at 09:07

## 2025-07-31 RX ADMIN — INSULIN ASPART 4 UNITS: 100 INJECTION, SOLUTION INTRAVENOUS; SUBCUTANEOUS at 12:07

## 2025-07-31 RX ADMIN — ACETAMINOPHEN 650 MG: 325 TABLET ORAL at 05:07

## 2025-07-31 RX ADMIN — ATORVASTATIN CALCIUM 20 MG: 20 TABLET, FILM COATED ORAL at 09:07

## 2025-07-31 NOTE — DISCHARGE SUMMARY
Candido giorgi - [facility]  Colorectal Surgery  Discharge Summary      Patient Name: Oneil Meza  MRN: [id number]  Admission Date: 7/28/2025  Hospital Length of Stay: 3 days  Discharge Date and Time: 07/31/2025 12:45 PM  Attending Physician: RINA Calderon MD   Discharging Provider: TUSHAR Briones  Primary Care Provider: No primary care provider on file.     HPI:  73 yo M with PMH of DM2 (last HgA1c 5.7 on 7/23/25), CVA (2015), paroxysmal Afib (Eliquis - low dose 2/2/ GIB), chronic pulmonary nodule, CKD3, HCC s/p resection (2023), cirrhosis, HTN, CAD, PAD s/p R fem-pop bypass with L SFA stent (2022), CHF (EF 60-65%), pulm HTN (32 mmHg on TTE 4/2025) presents today for laparoscopic right colectomy d/t hepatic flexure cancer.   Course is as follows:   CT abdomen pelvis 05/29/2025:  - Cirrhosis and mild splenomegaly.               - Postoperative changes from left partial hepatectomy for HCC  05/30/2025: Colonoscopy performed secondary to iron-deficiency anemia.   Impression:- Preparation of the colon was poor.                       - Malignant tumor at the hepatic flexure. Biopsied. Tattooed. (location suspected hepatic flexure, given looping, tattoo was placed just distally to ensure marking)                       - Multiple small polyps in the sigmoid colon, in the transverse colon and in the ascending colon. Resection not attempted.                       - The examination was otherwise normal on direct and retroflexion views.   Pathology: Fragments of tubular adenoma with at least high-grade dysplasia/intramucosal carcinoma, focally suspicious for invasion.   6/23/25 CEA 3.9  7/10/25 PET: Impression:  Subtle hypermetabolic focus in the colon at the hepatic flexure with no CT correlate, possibly correlating with biopsy-proven adenocarcinoma.   No other remote hypermetabolic foci.  Left upper lobe subpleural nodule with low level tracer uptake below that of the blood pool, similar in size compared to  "04/08/2022.    Procedure(s) (LRB):  COLECTOMY, RIGHT, LAPAROSCOPIC, ERAS high (N/A)     Hospital Course:  74 y.o. male with a past surgical history of liver resection and R fem-pop bypass presents today with a right-sided colon cancer for elective resection.   7/28: PROCEDURE PERFORMED: Laparoscopic right colectomy   7/29: AROBF - little gas, due to void s/p ricks discontinued. Denies n/v, tolerating PO. Decent pain control. Ambulating.   7/30: No BM, +gas, +voids spontaneously. Denies n/v, tolerating PO. Decent pain control. Ambulating.   7/31: Ambulating, tolerating diet without nausea or vomiting, voids spontaneously, pain controlled on PO medications, having bowel movements, passing flatus, hemodynamically stable, afebrile, surgical incisions healing well without signs of infection. Follow-up arranged. Discharge home.       Goals of Care Treatment Preferences:  Code Status: Full Code          Significant Diagnostic Studies: Labs: BMP: No results for input(s): "GLU", "NA", "K", "CL", "CO2", "BUN", "CREATININE", "CALCIUM", "MG" in the last 48 hours. and CBC No results for input(s): "WBC", "HGB", "HCT", "PLT" in the last 48 hours.    Pending Diagnostic Studies:       None          Final Active Diagnoses:    Diagnosis Date Noted POA    PRINCIPAL PROBLEM:  Colon adenocarcinoma [C18.9] 07/22/2025 Yes    S/P right colectomy [Z90.49] 07/29/2025 Not Applicable    BMI 24.0-24.9, adult [Z68.24] 07/29/2025 Not Applicable    High grade dysplasia in colonic adenoma [D12.6] 07/29/2025 Yes    Iron deficiency anemia [D50.9] 07/29/2025 Yes    Pulmonary hypertension [I27.20] 05/30/2025 Yes    Chronic diastolic congestive heart failure [I50.32] 05/30/2025 Yes    A-fib [I48.91] 04/27/2025 Yes     Chronic    HTN (hypertension) [I10] 04/27/2025 Yes    DM (diabetes mellitus) [E11.9] 04/27/2025 Yes    Coagulopathy [D68.9] 03/02/2024 Yes      Problems Resolved During this Admission:      Discharged Condition: good    Disposition: Home or " Self Care    Follow Up:   Follow-up Information       RINA Calderon MD Follow up on 8/14/2025.    Specialty: Colon and Rectal Surgery  Contact information:  [facility] [address] [phone]                           Patient Instructions:   No discharge procedures on file.  Medications:  Reconciled Home Medications:      Medication List        PAUSE taking these medications      amLODIPine 10 MG tablet  Wait to take this until your doctor or other care provider tells you to start again.  Commonly known as: NORVASC  Take 1 tablet (10 mg total) by mouth once daily.            START taking these medications      gabapentin 100 MG capsule  Commonly known as: NEURONTIN  Take 1 capsule (100 mg total) by mouth 3 (three) times daily.     methocarbamoL 500 MG Tab  Commonly known as: Robaxin  Take 1 tablet (500 mg total) by mouth 3 (three) times daily. for 10 days     oxyCODONE 5 MG immediate release tablet  Commonly known as: ROXICODONE  Take 1 tablet (5 mg total) by mouth every 4 (four) hours as needed for Pain.     polyethylene glycol 17 gram/dose powder  Commonly known as: GLYCOLAX  Take 17 g by mouth daily as needed for Constipation.            CONTINUE taking these medications      apixaban 2.5 mg Tab  Commonly known as: ELIQUIS  Take 1 tablet (2.5 mg total) by mouth 2 (two) times daily.     atorvastatin 20 MG tablet  Commonly known as: LIPITOR  Take 20 mg by mouth once daily.     cyanocobalamin (vitamin B-12) 1,000 mcg/mL Kit  Inject 1,000 mcg as directed every 30 days.     glipiZIDE 10 MG Tr24  Commonly known as: GLUCOTROL  Take 10 mg by mouth daily with breakfast.     nitroGLYCERIN 0.4 MG SL tablet  Commonly known as: NITROSTAT  Place 0.4 mg under the tongue every 5 (five) minutes as needed for Chest pain.            STOP taking these medications      HYDROcodone-acetaminophen 7.5-325 mg per tablet  Commonly known as: NORCO            ASK your doctor about these medications       albuterol 90 mcg/actuation inhaler  Commonly known as: PROVENTIL/VENTOLIN HFA  Inhale 2 puffs into the lungs every 4 (four) hours as needed for Shortness of Breath or Wheezing.     pantoprazole 40 MG tablet  Commonly known as: PROTONIX  Take 40 mg by mouth once daily.              TUSHAR Briones  Colorectal Surgery  Candido giorgi [facility]OR[facility]

## 2025-07-31 NOTE — PLAN OF CARE
Candido Mosley - [facility]  Discharge Final Note    Primary Care Provider: No primary care provider on file.  Expected Discharge Date: 7/31/2025    Patient medically ready for discharge to home.     Transportation by family.     Is family/patient aware of discharge: yes     Hospital follow up scheduled: yes       Final Discharge Note (most recent)       Final Note - 07/31/25 1328          Final Note    Assessment Type Final Discharge Note     Anticipated Discharge Disposition Home or Self Care     Hospital Resources/Appts/Education Provided Provided patient/caregiver with written discharge plan information                     Important Message from Medicare         Referral Info (most recent)       Referral Info    No documentation.                 Contact Info       RINA Calderon MD   Specialty: Colon and Rectal Surgery    [address] [facility] [address]   Phone: [phone]       Next Steps: Follow up on 8/14/2025          Future Appointments   Date Time Provider Department Center   8/11/2025  2:40 PM Therese Mathews MD [facility] HEMONC [place]   8/12/2025  1:00 PM HOSPITAL LAB, [facility] LAB [facility] LAB [facility]   8/12/2025  2:30 PM Joaquín Sauceda DNP [facility] URO [place]   8/14/2025  1:00 PM RINA Calderon MD DESC CNRS [place]   8/14/2025  3:00 PM Laine Florez MD [facility] NEPHRO [facility] Mos[facility]   8/15/2025  7:00 AM [facility] NM1 [facility] NUCLEAR [facility]   8/15/2025  8:45 AM ECHO, [facility] [place] [facility] EKG [facility]   8/15/2025  9:30 AM NUCLEAR TREADMILL, [facility] [place] [facility] EKG [facility]   8/15/2025 10:00 AM [facility] NM1 [facility] NUCLEAR [facility]   9/2/2025  1:30 PM Dalia Harley DO [facility] HEPAT Candido giorgi Thompson RN  Case Management  Ext: [phone]  07/31/2025

## 2025-07-31 NOTE — ASSESSMENT & PLAN NOTE
74 y.o. male with a past surgical history of liver resection and R fem-pop bypass presents today with a right-sided colon cancer for elective resection. Lap right colectomy performed on 7/28.    Ambulating, tolerating diet without nausea or vomiting, voids spontaneously, pain controlled on PO medications, having bowel movements, passing flatus, hemodynamically stable, afebrile, surgical incisions healing well without signs of infection. Follow-up arranged. Discharge home.

## 2025-07-31 NOTE — PLAN OF CARE
Problem: Adult Inpatient Plan of Care  Goal: Plan of Care Review  Outcome: Progressing  Goal: Patient-Specific Goal (Individualized)  Outcome: Progressing  Goal: Absence of Hospital-Acquired Illness or Injury  Outcome: Progressing  Goal: Optimal Comfort and Wellbeing  Outcome: Progressing  Goal: Readiness for Transition of Care  Outcome: Progressing     Problem: Diabetes Comorbidity  Goal: Blood Glucose Level Within Targeted Range  Outcome: Progressing     Problem: Hospitalized Older Adult  Goal: Optimal Cognitive Function  Outcome: Progressing  Goal: Effective Bowel Elimination  Outcome: Progressing  Goal: Optimal Coping  Outcome: Progressing  Goal: Fluid and Electrolyte Balance  Outcome: Progressing  Goal: Optimal Functional Ability  Outcome: Progressing  Goal: Improved Oral Intake  Outcome: Progressing  Goal: Adequate Sleep/Rest  Outcome: Progressing  Goal: Effective Urinary Elimination  Outcome: Progressing     Problem: Infection  Goal: Absence of Infection Signs and Symptoms  Outcome: Progressing     Problem: Wound  Goal: Optimal Coping  Outcome: Progressing  Goal: Optimal Functional Ability  Outcome: Progressing  Goal: Absence of Infection Signs and Symptoms  Outcome: Progressing  Goal: Improved Oral Intake  Outcome: Progressing  Goal: Optimal Pain Control and Function  Outcome: Progressing  Goal: Skin Health and Integrity  Outcome: Progressing  Goal: Optimal Wound Healing  Outcome: Progressing     Problem: Fall Injury Risk  Goal: Absence of Fall and Fall-Related Injury  Outcome: Progressing     Problem: Skin Injury Risk Increased  Goal: Skin Health and Integrity  Outcome: Progressing   Pt Aox4, RA. No PRNs given. Call light within reach, safety measures in place, rounded per facility policy.

## 2025-07-31 NOTE — NURSING
AAO X4, vitals stable, discharge instructions given, IV removed, pt states medication received, no questions regarding discharge, notified departure suite pt is ready, pt doesn't want to wait for wheelchair, left unit via ambulation.

## 2025-07-31 NOTE — DISCHARGE INSTRUCTIONS
"Discharge Instructions After Abdominal Operation:     You had a laparoscopic right colectomy performed on 7/28/25     Pain Control: It is expected to have pain after surgery, but this should improve over the next several weeks. You may be prescribed a narcotic pain medication and/or a muscle relaxer on discharge. You can take this medication as prescribed if the pain is severe but try to decrease the frequency at which you use the narcotic over the initial two (2) weeks.  You may find you need less narcotic pain medication if you combine or stagger it with Tylenol® (acetaminophen) and/or Advil® (ibuprofen). For example, you may take Tylenol 1000mg, then take Advil 600mg 3 hours later, then repeat Tylenol 3 hours after the Advil, and so on.  You should not take more than 4000 mg of Tylenol® or 3200 mg of Advil® in a 24-hour period.  Patients with IBD (crohns or UC) should not take NSAIDs like ibuprofen.  Narcotics and muscle relaxers can cause drowsiness, so sometimes it is helpful to take before bed for a more comfortable rest, but you CANNOT drive, operate machinery, or do mentally important work while on this medication.  Narcotics cause painful constipation - see below on how to prevent this or what to take if this happens.    Medications:  Okay to restart your other home meds. Consult your PCP or cardiologist regarding when to restart your amlodipine.     Wound Care: The incisions can be left open to air unless there is drainage, in which case you should cover the wound with a dry, clean bandage or piece of gauze. Change the dressing 1-2 times each day as needed to maintain a relatively dry dressing. You may have "skin glue" covering your incisions which will peel/crumble off on its own over the next week or two. If you have staples in place, staples are removed in 2-3 weeks by a nurse or physician.  You should shower every day without a dressing on the incisions and let the water run over the incisions. Do not " soak in a bathtub, hot tub or pool until the incisions are completely healed, which usually takes ~4-6 weeks.    Bowel Function: Diarrhea and loose stool are normal and expected after intestinal surgery. Bowel function initially tends to be erratic (increased frequency, gas, liquid/loose consistency, seepage, urgency), but it will improve over the next several months as your body adjusts to the surgical changes.    Diet: Try some of the following tips:  Eat several (4-6) small meals each day.  If you experience difficulty eating, add in supplemental drinks (Boost®, Ensure®, Glucerna®).  If you are having loose stools, your diet should include foods to add bulk to the stool such as applesauce, bananas, cheese, peanut butter, pasta, and potatoes.  Follow a Low Fiber Diet for about two weeks then add more fiber in your diet. High fiber foods including the following:  Raw vegetables, beans, corn, mushrooms, legumes, peas, potato skins, sauerkraut, stewed tomatoes, brussels sprouts  Fresh fruit, dried fruit (such as raisins or prunes), coconut, juices with pulp. (It is okay to eat fruits such as bananas, melons, canned fruits, and avocado.)  Meat with casings (such as hot dogs, kielbasa, sausage), shellfish  Nuts, popcorn, seeds, chunky peanut butter  Coarse whole grains including breads/rolls with nuts, cereals with nuts, coarse whole grains, poppy, sesame seeds    Activity: Walking is encouraged after surgery. Light aerobic activity such as climbing stairs or leisurely bike riding is acceptable but listen to your body and let pain be your guide when reintroducing activity. If it hurts, don't do it. Avoid the following activities for six (6) weeks after surgery:  Lifting objects over 10 pounds  Strenuous activity such as press-ups, push-ups, crunches, sit-ups, vigorous pulling/pushing, and repetitive twisting or bending    Driving: You should not drive a vehicle for the two (2) weeks after surgery or while taking  narcotic pain medications. When you return to driving, sit in your vehicle without starting the ignition and step on the brake firmly and rapidly a few times to assure you are confident with this task. Do not go alone the first time you drive.    Potential Problems:   Constipation is common when taking narcotics. You may feel full and may have gas pains. Drink plenty of non-caffeinated, non-alcoholic beverages and walk as much as possible. You can add a capful of miralax 1-2 times daily with liquids.  If this doesn't help, take senna 2 tablets 1-2 times a day, or docusate 200mg twice daily. Lastly, try milk of magnesium one dose a day.    Bowel obstruction or ileus is characterized by persistent abdominal cramps, bloating, constipation, nausea, or vomiting. If the symptoms are mild, you should restrict your dietary intake to only liquids, and avoid solid food for 2-3 days. If the symptoms are more severe or persist beyond 24 hours, please call your surgeon's office for advice.    Frequent stools and loose stools are best managed by using bulking agents or anti-diarrheal medication. Please call your surgeon if diarrhea is not improving after a few weeks to discuss starting one of the medications below.  Benefiber®, Citrucel®, Fibercon®, Konsyl®, and Metamucil® are bulking agents that are available at most grocery stores and pharmacies. The medication should be mixed using one (1) teaspoon of the powder in the minimum amount of fluid required to dissolve the agent and taken 1-2 times each day. Benefiber® can be alternatively sprinkled over food 1-2 times each day.  Imodium® (loperamide) is also available without a prescription. It is most effective if taken before meals. You should not take more than eight (8) tablets (32 mg) of Imodium in a 24-hour period. Please call your surgeon's office if you start taking Imodium®.     Dehydration can commonly occur, and its symptoms or signs include dark urine, dizziness when  standing, dry mouth, increased heart rate, leg cramps, and low volumes (less than 800 ml) of urine. If these occur, you should immediately call your surgeon's office. To avoid dehydration, you should do the following:  Drink a variety of fluids. Use an oral rehydration salt solution like Pedialyte, G2 Gatorade, Nuun tabs, etc. and sip the solution between meals.  Eat salty foods or add salt to your food.  Use an anti-diarrheal medication or bulking agent as previously instructed by your surgeon.     Wound infection can occur after any surgery and is characterized by increased drainage of cloudy fluid, odor, pain around the wound, redness of the skin around the wound extending 2-3 fingerbreadths outward, or temperature greater than 101 degrees. Please immediately call your surgeon's office if you begin experiencing these symptoms or signs.    Follow up:  Return to clinic in about 2 weeks for follow up and wound check. Call [phone] for worsening pain, inability to urinate, or fever > 101.  Call or schedule follow up in about 7 days via [facility] if you are not otherwise contacted or see an appointment in the portal.

## 2025-08-01 ENCOUNTER — DOCUMENTATION ONLY (OUTPATIENT)
Dept: CARDIOLOGY | Facility: CLINIC | Age: 75
End: 2025-08-01
Payer: COMMERCIAL

## 2025-08-01 NOTE — PROGRESS NOTES
[facility] ([facility]) Team notified of pt referral via Ambulatory Referral to Heart Failure Transitional Care ([id number]).    Patient screened today by provider and RN for enrollment to program.      Pt was deemed not a candidate for enrollment at this time related to patient current admission diagnosis/ problem will not benefit from the Heart Failure Transitional Care Program.PP scheduled Laparoscopic right colectomy     Pt will require additional follow up planning per primary team.     If pt status, diagnosis, or treatment plan changes , please place AMB referral to [facility] ([id number]) for [facility] enrollment re-evalution.

## 2025-08-06 DIAGNOSIS — C18.3 MALIGNANT NEOPLASM OF HEPATIC FLEXURE: Primary | ICD-10-CM

## 2025-08-06 LAB
DHEA SERPL-MCNC: NORMAL
ESTROGEN SERPL-MCNC: NORMAL PG/ML
INSULIN SERPL-ACNC: NORMAL U[IU]/ML
LAB AP CLINICAL INFORMATION: NORMAL
LAB AP GROSS DESCRIPTION: NORMAL
LAB AP PERFORMING LOCATION(S): NORMAL
LAB AP REPORT FOOTNOTES: NORMAL
LAB AP SYNOPTIC CHECKLIST: NORMAL
T3RU NFR SERPL: NORMAL %

## 2025-08-08 ENCOUNTER — TELEPHONE (OUTPATIENT)
Dept: HEMATOLOGY/ONCOLOGY | Facility: CLINIC | Age: 75
End: 2025-08-08
Payer: COMMERCIAL

## 2025-08-08 DIAGNOSIS — C18.3 MALIGNANT NEOPLASM OF HEPATIC FLEXURE: Primary | ICD-10-CM

## 2025-08-08 DIAGNOSIS — K63.89 MASS OF HEPATIC FLEXURE OF COLON: ICD-10-CM

## 2025-08-11 DIAGNOSIS — C18.9 MALIGNANT NEOPLASM OF COLON, UNSPECIFIED PART OF COLON: ICD-10-CM

## 2025-08-11 DIAGNOSIS — C18.9 MALIGNANT NEOPLASM OF COLON, UNSPECIFIED PART OF COLON: Primary | ICD-10-CM

## 2025-08-11 DIAGNOSIS — C18.9 COLON ADENOCARCINOMA: Primary | ICD-10-CM

## 2025-08-11 RX ORDER — OXYCODONE HYDROCHLORIDE 5 MG/1
5 TABLET ORAL EVERY 4 HOURS PRN
Qty: 28 TABLET | Refills: 0 | Status: SHIPPED | OUTPATIENT
Start: 2025-08-11

## 2025-08-11 RX ORDER — CIPROFLOXACIN 500 MG/1
500 TABLET, FILM COATED ORAL EVERY 12 HOURS
Qty: 6 TABLET | Refills: 0 | Status: SHIPPED | OUTPATIENT
Start: 2025-08-11 | End: 2025-08-14

## 2025-08-11 RX ORDER — OXYCODONE HYDROCHLORIDE 5 MG/1
5 TABLET ORAL EVERY 4 HOURS PRN
Qty: 28 TABLET | Refills: 0 | Status: CANCELLED | OUTPATIENT
Start: 2025-08-11

## 2025-08-13 ENCOUNTER — TELEPHONE (OUTPATIENT)
Dept: NEPHROLOGY | Facility: CLINIC | Age: 75
End: 2025-08-13
Payer: COMMERCIAL

## 2025-08-14 ENCOUNTER — OFFICE VISIT (OUTPATIENT)
Dept: SURGERY | Facility: CLINIC | Age: 75
End: 2025-08-14
Payer: COMMERCIAL

## 2025-08-14 ENCOUNTER — TELEPHONE (OUTPATIENT)
Dept: HEMATOLOGY/ONCOLOGY | Facility: CLINIC | Age: 75
End: 2025-08-14
Payer: COMMERCIAL

## 2025-08-14 VITALS
DIASTOLIC BLOOD PRESSURE: 74 MMHG | HEART RATE: 80 BPM | BODY MASS INDEX: 24.06 KG/M2 | SYSTOLIC BLOOD PRESSURE: 148 MMHG | HEIGHT: 68 IN | WEIGHT: 158.75 LBS

## 2025-08-14 DIAGNOSIS — C18.9: Primary | ICD-10-CM

## 2025-08-14 PROCEDURE — 3288F FALL RISK ASSESSMENT DOCD: CPT | Mod: CPTII,S$GLB,, | Performed by: COLON & RECTAL SURGERY

## 2025-08-14 PROCEDURE — 3078F DIAST BP <80 MM HG: CPT | Mod: CPTII,S$GLB,, | Performed by: COLON & RECTAL SURGERY

## 2025-08-14 PROCEDURE — 1101F PT FALLS ASSESS-DOCD LE1/YR: CPT | Mod: CPTII,S$GLB,, | Performed by: COLON & RECTAL SURGERY

## 2025-08-14 PROCEDURE — 99999 PR PBB SHADOW E&M-EST. PATIENT-LVL III: CPT | Mod: PBBFAC,,, | Performed by: COLON & RECTAL SURGERY

## 2025-08-14 PROCEDURE — 99024 POSTOP FOLLOW-UP VISIT: CPT | Mod: S$GLB,,, | Performed by: COLON & RECTAL SURGERY

## 2025-08-14 PROCEDURE — 3077F SYST BP >= 140 MM HG: CPT | Mod: CPTII,S$GLB,, | Performed by: COLON & RECTAL SURGERY

## 2025-08-14 PROCEDURE — 1159F MED LIST DOCD IN RCRD: CPT | Mod: CPTII,S$GLB,, | Performed by: COLON & RECTAL SURGERY

## 2025-08-14 PROCEDURE — 1126F AMNT PAIN NOTED NONE PRSNT: CPT | Mod: CPTII,S$GLB,, | Performed by: COLON & RECTAL SURGERY

## 2025-08-14 RX ORDER — PEN NEEDLE, DIABETIC 31 G X1/4"
NEEDLE, DISPOSABLE MISCELLANEOUS
COMMUNITY
Start: 2025-08-13

## 2025-08-14 RX ORDER — INSULIN GLARGINE 100 [IU]/ML
INJECTION, SOLUTION SUBCUTANEOUS
COMMUNITY
Start: 2025-08-01

## 2025-09-02 DIAGNOSIS — C18.9 MALIGNANT NEOPLASM OF COLON, UNSPECIFIED PART OF COLON: ICD-10-CM

## 2025-09-02 RX ORDER — OXYCODONE HYDROCHLORIDE 5 MG/1
5 TABLET ORAL EVERY 4 HOURS PRN
Qty: 28 TABLET | Refills: 0 | Status: SHIPPED | OUTPATIENT
Start: 2025-09-02

## 2025-09-04 ENCOUNTER — TELEPHONE (OUTPATIENT)
Dept: HEMATOLOGY/ONCOLOGY | Facility: CLINIC | Age: 75
End: 2025-09-04
Payer: COMMERCIAL

## (undated) DEVICE — SEALER LIGASURE LAP 37CM 5MM

## (undated) DEVICE — DRESSING ABSRBNT ISLAND 3.6X8

## (undated) DEVICE — SYR 10CC LUER LOCK

## (undated) DEVICE — PAD PINK TRENDELENBURG POS XL

## (undated) DEVICE — COVER MAYO STND XL 30X57IN

## (undated) DEVICE — DRAPE ABDOMINAL TIBURON 14X11

## (undated) DEVICE — TIP YANKAUERS BULB NO VENT

## (undated) DEVICE — NDL HYPO STD REG BVL 22GX1.5IN

## (undated) DEVICE — NDL BOX COUNTER

## (undated) DEVICE — SYR IRRIGATION BULB STER 60ML

## (undated) DEVICE — DRAPE CORETEMP FLD WRM 56X62IN

## (undated) DEVICE — ELECTRODE MEGADYNE RETURN DUAL

## (undated) DEVICE — CHLORAPREP TNT2%SOL10.5ML TEAL

## (undated) DEVICE — ELECTRODE REM PLYHSV RETURN 9

## (undated) DEVICE — COVER LIGHT HANDLE 80/CA

## (undated) DEVICE — KIT ANTIFOG W/SPONG & FLUID

## (undated) DEVICE — TRAY CATH 1-LYR URIMTR 16FR

## (undated) DEVICE — KIT VUETIP TROCAR SWAB

## (undated) DEVICE — TROCAR KII BLLN 12MM 10CM

## (undated) DEVICE — RELOAD PROXIMATE CUT BLUE 75MM

## (undated) DEVICE — SUT VICRYL PLUS 4-0 PS2 27

## (undated) DEVICE — PENCIL ROCKER SWITCH 10FT CORD

## (undated) DEVICE — TROCAR ENDOPATH XCEL 5X75MM

## (undated) DEVICE — TUBING HF INSUFFLATION W/ FLTR

## (undated) DEVICE — TROCAR ENDOPATH XCEL 5MM 7.5CM

## (undated) DEVICE — DRAPE INCISE IOBAN 2 23X17IN

## (undated) DEVICE — TRAY GENERAL SURGERY OMC

## (undated) DEVICE — MARKER SKIN RULER STERILE

## (undated) DEVICE — SYR ONLY LUER LOCK 20CC

## (undated) DEVICE — SUT 3-0 VICRYL SH CR/8 18

## (undated) DEVICE — Device

## (undated) DEVICE — SUT VICRYL PLUS 3-0 SH 18IN

## (undated) DEVICE — BOWL STERILE LARGE 32OZ

## (undated) DEVICE — APPLICATOR CHLORAPREP ORN 26ML

## (undated) DEVICE — CUTTER PROXIMATE BLUE 75MM